# Patient Record
Sex: FEMALE | Race: WHITE | NOT HISPANIC OR LATINO | Employment: OTHER | URBAN - METROPOLITAN AREA
[De-identification: names, ages, dates, MRNs, and addresses within clinical notes are randomized per-mention and may not be internally consistent; named-entity substitution may affect disease eponyms.]

---

## 2023-07-19 ENCOUNTER — APPOINTMENT (OUTPATIENT)
Dept: PRIMARY CARE | Facility: CLINIC | Age: 68
End: 2023-07-19
Payer: MEDICARE

## 2023-09-14 ENCOUNTER — OFFICE VISIT (OUTPATIENT)
Dept: PRIMARY CARE | Facility: CLINIC | Age: 68
End: 2023-09-14
Payer: MEDICARE

## 2023-09-14 VITALS
BODY MASS INDEX: 27.36 KG/M2 | SYSTOLIC BLOOD PRESSURE: 120 MMHG | HEART RATE: 60 BPM | WEIGHT: 154.4 LBS | HEIGHT: 63 IN | DIASTOLIC BLOOD PRESSURE: 62 MMHG | TEMPERATURE: 97.2 F | RESPIRATION RATE: 16 BRPM

## 2023-09-14 DIAGNOSIS — M85.80 OSTEOPENIA, UNSPECIFIED LOCATION: Primary | ICD-10-CM

## 2023-09-14 DIAGNOSIS — Z00.00 HEALTH CARE MAINTENANCE: ICD-10-CM

## 2023-09-14 DIAGNOSIS — E55.9 VITAMIN D DEFICIENCY: ICD-10-CM

## 2023-09-14 DIAGNOSIS — R53.83 OTHER FATIGUE: ICD-10-CM

## 2023-09-14 DIAGNOSIS — Z23 NEED FOR VACCINATION: ICD-10-CM

## 2023-09-14 PROBLEM — M17.12 LEFT KNEE DJD: Status: ACTIVE | Noted: 2023-09-14

## 2023-09-14 PROBLEM — G43.909 MIGRAINES: Status: ACTIVE | Noted: 2023-09-14

## 2023-09-14 PROBLEM — M79.10 MYALGIA: Status: ACTIVE | Noted: 2023-09-14

## 2023-09-14 PROBLEM — J34.89 NASAL PAIN: Status: ACTIVE | Noted: 2023-09-14

## 2023-09-14 PROBLEM — E89.40 POSTSURGICAL MENOPAUSE: Status: ACTIVE | Noted: 2023-09-14

## 2023-09-14 PROBLEM — R20.0 NUMBNESS: Status: ACTIVE | Noted: 2023-09-14

## 2023-09-14 PROBLEM — U07.1 COVID-19 VIRUS INFECTION: Status: ACTIVE | Noted: 2023-09-14

## 2023-09-14 PROBLEM — G44.209 TENSION HEADACHE: Status: ACTIVE | Noted: 2023-09-14

## 2023-09-14 PROBLEM — M25.562 KNEE PAIN, LEFT: Status: ACTIVE | Noted: 2023-09-14

## 2023-09-14 PROBLEM — R51.9 FACIAL PAIN: Status: ACTIVE | Noted: 2023-09-14

## 2023-09-14 PROBLEM — M53.3 SACRAL BACK PAIN: Status: ACTIVE | Noted: 2023-09-14

## 2023-09-14 PROBLEM — M67.869: Status: ACTIVE | Noted: 2023-09-14

## 2023-09-14 PROBLEM — M54.2 CHRONIC NECK PAIN: Status: ACTIVE | Noted: 2023-09-14

## 2023-09-14 PROBLEM — N63.0 BREAST NODULE: Status: ACTIVE | Noted: 2023-09-14

## 2023-09-14 PROBLEM — M79.9 POSTURAL STRAIN: Status: ACTIVE | Noted: 2023-09-14

## 2023-09-14 PROBLEM — M77.9 TENDINITIS: Status: ACTIVE | Noted: 2023-09-14

## 2023-09-14 PROBLEM — G89.29 CHRONIC NECK PAIN: Status: ACTIVE | Noted: 2023-09-14

## 2023-09-14 PROBLEM — M25.78 CERVICAL OSTEOPHYTE: Status: ACTIVE | Noted: 2023-09-14

## 2023-09-14 PROBLEM — M54.12 CERVICAL RADICULITIS: Status: ACTIVE | Noted: 2023-09-14

## 2023-09-14 PROBLEM — R51.9 SEVERE FRONTAL HEADACHES: Status: ACTIVE | Noted: 2023-09-14

## 2023-09-14 PROBLEM — M47.816 SPONDYLOSIS OF LUMBAR REGION WITHOUT MYELOPATHY OR RADICULOPATHY: Status: ACTIVE | Noted: 2023-09-14

## 2023-09-14 PROBLEM — G44.86 CERVICOGENIC HEADACHE: Status: ACTIVE | Noted: 2023-09-14

## 2023-09-14 PROBLEM — Z90.710 HISTORY OF TOTAL ABDOMINAL HYSTERECTOMY: Status: ACTIVE | Noted: 2023-09-14

## 2023-09-14 PROBLEM — F41.9 ANXIETY: Status: ACTIVE | Noted: 2023-09-14

## 2023-09-14 PROBLEM — M79.674 PAIN OF TOE OF RIGHT FOOT: Status: ACTIVE | Noted: 2023-09-14

## 2023-09-14 PROBLEM — M47.812 SPONDYLOSIS OF CERVICAL REGION WITHOUT MYELOPATHY OR RADICULOPATHY: Status: ACTIVE | Noted: 2023-09-14

## 2023-09-14 PROBLEM — E78.5 DYSLIPIDEMIA: Status: ACTIVE | Noted: 2023-09-14

## 2023-09-14 PROBLEM — R39.9 UTI SYMPTOMS: Status: ACTIVE | Noted: 2023-09-14

## 2023-09-14 PROBLEM — M99.09 SEGMENTAL AND SOMATIC DYSFUNCTION: Status: ACTIVE | Noted: 2023-09-14

## 2023-09-14 PROCEDURE — 99214 OFFICE O/P EST MOD 30 MIN: CPT | Performed by: INTERNAL MEDICINE

## 2023-09-14 PROCEDURE — G0008 ADMIN INFLUENZA VIRUS VAC: HCPCS | Performed by: INTERNAL MEDICINE

## 2023-09-14 PROCEDURE — 90662 IIV NO PRSV INCREASED AG IM: CPT | Performed by: INTERNAL MEDICINE

## 2023-09-14 PROCEDURE — 1160F RVW MEDS BY RX/DR IN RCRD: CPT | Performed by: INTERNAL MEDICINE

## 2023-09-14 PROCEDURE — 1159F MED LIST DOCD IN RCRD: CPT | Performed by: INTERNAL MEDICINE

## 2023-09-14 PROCEDURE — 1157F ADVNC CARE PLAN IN RCRD: CPT | Performed by: INTERNAL MEDICINE

## 2023-09-14 RX ORDER — CALCIUM CARBONATE 300MG(750)
400 TABLET,CHEWABLE ORAL DAILY
COMMUNITY

## 2023-09-14 RX ORDER — MULTIVITAMIN
1 TABLET ORAL DAILY
COMMUNITY

## 2023-09-14 RX ORDER — ASCORBIC ACID 500 MG
500 TABLET ORAL DAILY
COMMUNITY

## 2023-09-14 RX ORDER — CLOCORTOLONE PIVALATE 0 G/G
CREAM TOPICAL AS NEEDED
COMMUNITY

## 2023-09-14 RX ORDER — CETIRIZINE HYDROCHLORIDE 10 MG/1
10 TABLET ORAL DAILY PRN
COMMUNITY

## 2023-09-14 RX ORDER — DICLOFENAC SODIUM 10 MG/G
GEL TOPICAL
COMMUNITY
End: 2023-12-08 | Stop reason: ALTCHOICE

## 2023-09-14 RX ORDER — FLUTICASONE PROPIONATE 50 MCG
SPRAY, SUSPENSION (ML) NASAL AS NEEDED
COMMUNITY

## 2023-09-14 RX ORDER — CHOLECALCIFEROL (VITAMIN D3) 25 MCG
1000 TABLET ORAL DAILY
COMMUNITY

## 2023-09-14 ASSESSMENT — PATIENT HEALTH QUESTIONNAIRE - PHQ9: 2. FEELING DOWN, DEPRESSED OR HOPELESS: NOT AT ALL

## 2023-09-14 NOTE — PATIENT INSTRUCTIONS
Was nice seeing you today.  Continue same medication.  Have lab work done before next appointment if labs were ordered today.  Fu in 6 month/prn  Call/ contact our office with any concerns.    - Maintaining good vitamin D blood levels is important for bone health and overall health. Please see additional information on vitamin D below.  A vitamin D blood test, called vitamin D 25-hydroxy (OH) is obtained to assess vitamin D level. If the vitamin D is low, you will need to take a vitamin D supplement. If you are already on a vitamin D supplement, then the dose will need to be adjusted.  Also you multivitamin may contain vitamin D.  Vitamin D is a fat soluble vitamin that requires it be taken with good fat to be absorbed. Examples of healthy fat include nuts, seeds, avocados, olives and for the most part the meal of the day.  Spending up to 30 minutes in the sun during Summer and late Spring can provide natural vitamin D to the uncovered skin (arms, legs)    - Your calcium can be sufficient in your diet.  Healthy food that are rich in calcium include: nuts, seeds, legumes/beans, peas, dark green leafy vegetables, plant based milk  Additional calcium rich foods listed below  - Soaking Almonds overnight in the fridge with drinking water, can help with softening the almonds and improved absorption of the almonds. Please be careful not to break a tooth with dry raw almonds, or other hard nuts or seeds.  If your lab work shows insufficient calcium or you are unable to consume sufficient foods rich in calcium, then a calcium supplement is recommended, such as calcium citrate.    - You can track your nutrition and calcium intake on [email or web address]  This provides macro and micronutrient intake and requirements.  - You can track your calcium intake on [email or web address] or any other calcium tracker of your choice.  If you are not getting about 1200 mg of calcium daily, you will need to add a calcium supplement, such as calcium  citrate to supplement you daily calcium so you can achieve your total 1200 mg daily  See additional information below on calcium.    - I recommend following a healthy lifestyle. You can find additional information below.      - You can track your nutrition and calcium intake on [email or web address].com  This provides macro and micronutrient intake and requirements.   GENERAL INFORMATION ON BONE HEALTH :   -Bone Density testing (DXA scan) as recommended.   -Vitamin D supplementation is recommended, unless blood levels are sufficient.   Recommended daily dose of 1000 to 2000 IU total a day, or the dose necessary to achieve a Vitamin D 25-OH blood level of >31 and preferably closer to 40-60 ng/mL.   Vitamin D pills are available over the counter.  -Recommended daily dose of calcium: 1200mg total a day in divided doses.  Calcium is usually sufficient in our regular diet, also available in multivitamins. Patients on certain dietary restrictions or those unable to meet their daily calcium by diety alone, may require calcium supplements. For patient with history of calcium kidney stones, Calcium Citrate would be the recommended supplement. It is recommended to avoid caclium carbonate supplement in this case, as these may increase risk of calcium kidney stones.  When you read a food label and you see calcium reported as DV %, add a zero and this will provide you with the approximate mg value of the calcium content in this food. For example, if a glass of almond milk is labeled as 40% calcium DV, then this contains 400 mg of calcium.  -Regular weight-bearing and muscle-strengthening exercise   -Avoidance of tobacco smoking, excessive alcohol intake and excessive caffeine intake.   -Fall and fracture precautions   -It is recommend to continue regular follow up visits with your dentist every 6 months, and continue with good oral hygiene.    Calcium:   If your diet is sufficient in Calcium rich food, you will not need calcium  supplement.    Calcium Citrate is the preferred calcium if you have had kidney stones.  Daily recommended calcium dose: 600mg twice a day with meals.    Adequate calcium ingestion is essential for maintaining healthy bones. The recommended dose daily intake of calcium varies depending on individual needs but is usually between 1200 and 1500mg daily, preferably around 1200mg a day in divided dose (not all taken at once). This is equivalent to about five 8oz glasses of milk per day.   Many foods are rich in calcium and they include:  - Plant based, non-dairy, calcium rich products, include nuts, almond milk, beans, lentils  - Vegetables and Fruit: bok-montanez, turnips, broccoli, kale, collards,  - Dairy products: milk, cheese, yogurt, ice-cream  - Fish products: canned salmon, sardines and shrimp  - Cereals and nuts: almonds, sesame seeds, fortified cereals and oatmeal  - Other foods: fortified orange-juice, figs, soybeans, other beans and eggs.  If you have a low calcium diet and cannot tolerate calcium-rich foods, many supplements are available today. Your pharmacist can help you choose the one which best suits your needs. A few tips on supplements:  - They should be easy to swallow  - They should dissolve easily in ½ cup of vinegar in < 15 minutes.  - Count the ELEMENTAL calcium mgs. E.g. Calcium 499mg may have only 221mg of elemental Calcium.  - Calcium citrate is the calcium supplement to take if you have had kidney stones and unable to meet your calcium requirements from food/diet alone.  - There is such a variety today that it is best to bring in the bottle to your doctor to show them exactly what you are taking.   Lastly too much calcium can be bad for you. Recent studies show extra supplements may increase your risk of kidney stones or cause high calcium levels in some people. You should discuss how much you should be taking with your doctor before starting them.  Further Information is available from the  following resources:  www.nof.org (National Osteoporosis Foundation)  http://www.osteo.org/osteolinks.asp  National Institutes of Health: 0-026-921-BONE  The Calcium Information [place]: 3-733-386-5284    -Non-Dairy, Plant based Milk, can contain in1 glass up to 450 mg of calcium (300 to 450 mg)  Exampled include Oat Milk, Flax Milk, [place] Milk, Cashew Milk, Soy Milk, Peas Milk  general health and well being    Examples of Food Sources of Calcium from [facility]  Food Milligrams (mg)  per serving Percent DV*   Soymilk, calcium-fortified, 8 ounces 299 30   Orange juice, calcium-fortified, 6 ounces 261 26   Tofu, firm, made with calcium sulfate, ½ cup* 253 25   Tofu, soft, made with calcium sulfate, ½ cup* 138 14   Ready-to-eat cereal, calcium-fortified, 1 cup 100-1,000    Turnip greens, fresh, boiled, ½ cup 99 10   Kale, raw, chopped, 1 cup 100 10   Kale, fresh, cooked, 1 cup 94 9   Chinese cabbage, bok montanez, raw, shredded, 1 cup 74 7   Bread, white, 1 slice 73 7   Tortilla, corn, ready-to-bake/woodall, one 6” diameter 46 5   Tortilla, flour, ready-to-bake/woodall, one 6” diameter 32 3   Bread, whole-wheat, 1 slice 30 3   Broccoli, raw, ½ cup 21 2   * DV = Daily Value. DVs were developed by the U.S. Food and Drug Administration to help consumers compare the nutrient contents among products within the context of a total daily diet.   The U.S. Department of Agriculture’s (USDA’s) Nutrient Database Web site lists the nutrient content of many foods and provides comprehensive list of foods containing calcium arranged by nutrient content and by food name.  *Calcium content varies slightly by fat content; the more fat, the less calcium the food contains.  * Calcium content is for tofu processed with a calcium salt. Tofu processed with other salts does not provide significant amounts of calcium.  You could acces this information online at: http://ods.od.nih.gov/factsheets/Calcium-HealthProfessional/    Vitamin D:   Vitamin D3=  cholecalciferol, available over the counter.  Dose recommended 800 to 1000 iu daily with a meal; Certain patients require 8023-5084 iu daily and in patients deficient in Vitamin D, they require higher dosages.  Certain patient requires higher dose, depending on their Vit D blood levels.   Vitamin D is essential for calcium metabolism. It is really a hormone produced mainly in your skin after exposure to sunlight. Vitamin D helps you absorb calcium from your stomach and kidneys and incorporates it into your bones. Studies show approximately 50% of [place] men and women are vitamin D deficient in the winter. Milder cases of vitamin D are usually asymptomatic so the only way to know you have a problem is to have a blood level checked. More severe cases can cause osteomalacia (a.k.a. rickets) which can result in bone pain, weak bones and several abnormal laboratory tests and also weak muscles (a.k.a. myopathy). When this happens, your bones lose a lot of their calcium stores as the body tries to regulate the calcium required by other tissues. Prolonged deficiency can lead to severe bone disorders and fractures.  Unlike calcium, dietary sources of vitamin D are rare, limited to a few fish oils particularly cod-liver oil, other fortified foods and egg yolks. and most are unhealthy. Natural source of vitamin D is through sunshine. This is usually during debbie seasons, for example a 30 minute exposure to sunshine. Some people are unable to be exposed to the sun due to skin condition. Often supplementation is needed. Many multivitamins contain some vitamin D and vitamin D alone preparations are now available in several forms. The recommended daily intake of vitamin D used to be 400 and 800 international units, however, it is now known that larger amounts are needed, as discussed above. Your doctor can prescribe prescription strength vitamin D for you if necessary, if you have marked deficiency or diseases of the liver  or kidney. Supplementation in patients with severe deficiency can stabilize or improve bone mineral density and in frail elderly persons, may reduce their risk of falling.

## 2023-09-14 NOTE — PROGRESS NOTES
"Subjective   Yoana Herzog is a 68 y.o. female who presents for Follow-up (6 months follow up/Mammogram and dexa-9.2023).  6 months follow up  Mammogram and dexa- 9.2023  Retired , doing very well, has nice supportive family.  To have colonoscopy soon      Review of Systems   All other systems reviewed and are negative.      Objective   Physical Exam  Constitutional:       Appearance: Normal appearance.   HENT:      Head: Normocephalic and atraumatic.   Eyes:      Pupils: Pupils are equal, round, and reactive to light.   Cardiovascular:      Rate and Rhythm: Normal rate and regular rhythm.   Pulmonary:      Effort: Pulmonary effort is normal.      Breath sounds: Normal breath sounds.   Musculoskeletal:         General: Normal range of motion.      Cervical back: Normal range of motion and neck supple.   Skin:     General: Skin is warm.   Neurological:      General: No focal deficit present.      Mental Status: She is alert and oriented to person, place, and time.   Psychiatric:         Mood and Affect: Mood normal.         Behavior: Behavior normal.       /62 (BP Location: Left arm, Patient Position: Sitting)   Pulse 60   Temp 36.2 °C (97.2 °F)   Resp 16   Ht 1.6 m (5' 3\")   Wt 70 kg (154 lb 6.4 oz)   BMI 27.35 kg/m²       Assessment/Plan   Problem List Items Addressed This Visit       Osteopenia - Primary     2023 -1.4, better then  2021  Continue same regim         Vitamin D deficiency    Relevant Orders    Vitamin D 25-Hydroxy,Total (for eval of Vitamin D levels)     Other Visit Diagnoses       Need for vaccination        Relevant Orders    Flu vaccine, quadrivalent, high-dose, preservative free, age 65y+ (FLUZONE)    Health care maintenance        Relevant Orders    CBC and Auto Differential    TSH with reflex to Free T4 if abnormal    Urinalysis with Reflex Microscopic    Vitamin B12    Magnesium    Lipid Panel    Hepatitis C Antibody    Comprehensive Metabolic Panel    Vitamin D 25-Hydroxy,Total " (for eval of Vitamin D levels)    Other fatigue        Relevant Orders    CBC and Auto Differential

## 2023-10-20 RX ORDER — FEXOFENADINE HCL AND PSEUDOEPHEDRINE HCI 60; 120 MG/1; MG/1
1 TABLET, EXTENDED RELEASE ORAL 2 TIMES DAILY
COMMUNITY
Start: 2007-03-22 | End: 2023-12-08 | Stop reason: ALTCHOICE

## 2023-10-20 RX ORDER — IVERMECTIN 10 MG/G
CREAM TOPICAL
COMMUNITY
End: 2023-12-08 | Stop reason: ALTCHOICE

## 2023-10-20 RX ORDER — IBUPROFEN 600 MG/1
600 TABLET ORAL
COMMUNITY
End: 2023-12-08 | Stop reason: ALTCHOICE

## 2023-10-23 ENCOUNTER — ALLIED HEALTH (OUTPATIENT)
Dept: INTEGRATIVE MEDICINE | Facility: CLINIC | Age: 68
End: 2023-10-23
Payer: MEDICARE

## 2023-10-23 DIAGNOSIS — M53.3 SACRAL BACK PAIN: ICD-10-CM

## 2023-10-23 DIAGNOSIS — M99.03 SEGMENTAL AND SOMATIC DYSFUNCTION OF LUMBAR REGION: ICD-10-CM

## 2023-10-23 DIAGNOSIS — M79.9 POSTURAL STRAIN: ICD-10-CM

## 2023-10-23 DIAGNOSIS — M99.04 SEGMENTAL AND SOMATIC DYSFUNCTION OF SACRAL REGION: ICD-10-CM

## 2023-10-23 DIAGNOSIS — M47.816 LUMBAR SPONDYLOSIS: ICD-10-CM

## 2023-10-23 DIAGNOSIS — M54.50 CHRONIC RIGHT-SIDED LOW BACK PAIN WITHOUT SCIATICA: ICD-10-CM

## 2023-10-23 DIAGNOSIS — M47.812 CERVICAL SPONDYLOSIS WITHOUT MYELOPATHY: ICD-10-CM

## 2023-10-23 DIAGNOSIS — G89.29 CHRONIC RIGHT-SIDED LOW BACK PAIN WITHOUT SCIATICA: ICD-10-CM

## 2023-10-23 DIAGNOSIS — M99.00 SEGMENTAL AND SOMATIC DYSFUNCTION OF HEAD REGION: Primary | ICD-10-CM

## 2023-10-23 DIAGNOSIS — M99.01 SEGMENTAL AND SOMATIC DYSFUNCTION OF CERVICAL REGION: ICD-10-CM

## 2023-10-23 DIAGNOSIS — M79.10 MYALGIA: ICD-10-CM

## 2023-10-23 DIAGNOSIS — M54.2 NECK PAIN: ICD-10-CM

## 2023-10-23 DIAGNOSIS — M99.02 SEGMENTAL AND SOMATIC DYSFUNCTION OF THORACIC REGION: ICD-10-CM

## 2023-10-23 DIAGNOSIS — M99.05 SEGMENTAL AND SOMATIC DYSFUNCTION OF PELVIC REGION: ICD-10-CM

## 2023-10-23 PROCEDURE — 98942 CHIROPRACTIC MANJ 5 REGIONS: CPT | Performed by: CHIROPRACTOR

## 2023-10-23 ASSESSMENT — ENCOUNTER SYMPTOMS
AGITATION: 0
DIFFICULTY URINATING: 0
CONFUSION: 0
ALLERGIC/IMMUNOLOGIC COMMENTS: +SEASONAL ALLERGIES
SHORTNESS OF BREATH: 0
NAUSEA: 0
FACIAL ASYMMETRY: 0
ABDOMINAL PAIN: 0
FEVER: 0
CHILLS: 0

## 2023-10-23 NOTE — PROGRESS NOTES
Subjective   Patient ID: Yoana Herzog is a 68 y.o. female who presents today for the treatment of pain involving their left sided neck and right sided hip/sacrum pain.    This is visit 12 of the calendar year. Medicare.    Fall risk: None. No falls in the last 6 months.     BORIS Lees presents today with the same complaints of left sided neck and right side hip/sacrum pain. She has been doing well since our last encounter as she would like to focus treatment on the same regions as it has been beneficial for her. 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 No additional injuries or changes in her medical history.    Patient describes the pain as achiness and tingling, and rates the current pain 4 out of 10 (10 being the worst).     Last treatment visit 9/26/23:  She would like to continue to focus treatment on the left greater than right upper trapezius and neck regions. 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 Therefore she has been doing a lot of bending lifting and reaching. She has not been doing this consecutive days so she has days in between for recovery and rest. She also continues to have soreness and tightness in the right sacrum which is another chronic area that has regular exacerbations and flareups.      INITIAL INTAKE/SUBJECTIVE 12/12/22: She has seen chiropractic in the past. Her last adjustment was about 5 weeks ago. Is not that she is dissatisfied with the care she has been given, but she is hoping that our treatments will help her completely resolve the issue to the point where she does not need regular care.     Today her primary complaint is left neck and shoulder pain. 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 She also enjoys sewing and notices this pain does get worse after she has been doing a lot of sewing. She will  occasionally feel tingling into the left hand most notably in the #3 through 5 fingers. She does have some headaches but these are localized to the suboccipital region and do not radiate into the forehead eyes or temples. In the past her physical therapist and chiropractors have done manual stretching of the left shoulder and done adjustments which have helped.     Her secondary pain complaints are in the right hip glued and sacral pains. She does sleep with a pillow between the knees. She is unsure why but the last 6 to 12 months this has been getting progressively worse. She will occasionally have pain in the hamstring region but this is rare. She will occasionally feel sharp intense pain in the hip or the lower back but this only typically lasts a few seconds. Generally is more of a dull aching pain. The point of max tenderness is localized to the middle of the right butt cheek. No one has worked on this or massage it in the past.    Review of Systems   Constitutional:  Negative for chills and fever.   HENT:  Negative for congestion and sneezing.    Eyes:  Negative for visual disturbance.   Respiratory:  Negative for shortness of breath.    Cardiovascular:  Negative for chest pain.   Gastrointestinal:  Negative for abdominal pain and nausea.   Endocrine: Negative for polyuria.   Genitourinary:  Negative for difficulty urinating.        2004 Hysterectomy   Musculoskeletal:         +Osteopenia, BL knee surgeries 1960/1970.   Skin:         +Rosacea    Allergic/Immunologic: Positive for environmental allergies.        +seasonal allergies   Neurological:  Negative for facial asymmetry.   Psychiatric/Behavioral:  Negative for agitation and confusion.      Objective   Observation : normal gait and forward head posture    Physical Exam  Examination findings (palpation & ROM): Tenderness palpated over the UT and levator scapulae on the left. Hypertonicity palpated over the right piriformis, right SIJ, right gluteal  regions.      Segmental joint dysfunction was identified in the following areas using motion palpation and/or pain provocation assessment:  Cervical: C0-C3 (Supine)   Thoracic: T4-T9 (Prone)  Lumbar: L3-L5 (Scoop)  Sacrum: L5/S1 and right SI (Drop)    Technique Used: diversified CMT, pelvic drop table technique and manual traction.      Today's treatment:  Performed spinal manipulation to the regions of segmental dysfunction identified on examination using age-appropriate and injury specific force, and manual diversified technique.     Brief Seated IASTM and supine MFR to the L>R neck, upper trapezius and L shoulder region. Side-lying and prone MFR, IC to the R SIJ, R piriformis, and R gluteals.         Treatment Plan:   The patient and I discussed the risks and benefits of chiropractic care. Based on the patient's subjective complaints along with the examination findings, it is advised that a course of chiropractic treatment by initiated. Consent for care was given both written and orally by the patient. The patient tolerated today's treatment with little or no additional discomfort and was instructed to contact the office for questions or concerns.     Treatment Frequency: Will see/treat patient every 2-4 weeks as therapeutic benefit is sustained, then frequency will be reduced to as needed for symptom management or as needed for acute flare-ups/exacerbation.     Please note: Voice-to-text software was used when completing this note.  While the note was proofread, portions may include grammatical errors.  Please contact me with any questions/concerns as it relates to these types of errors.

## 2023-11-06 ENCOUNTER — OFFICE VISIT (OUTPATIENT)
Dept: OTOLARYNGOLOGY | Facility: CLINIC | Age: 68
End: 2023-11-06
Payer: MEDICARE

## 2023-11-06 ENCOUNTER — LAB (OUTPATIENT)
Dept: LAB | Facility: LAB | Age: 68
End: 2023-11-06
Payer: MEDICARE

## 2023-11-06 VITALS
HEIGHT: 63 IN | DIASTOLIC BLOOD PRESSURE: 77 MMHG | HEART RATE: 60 BPM | BODY MASS INDEX: 27.46 KG/M2 | SYSTOLIC BLOOD PRESSURE: 143 MMHG | WEIGHT: 155 LBS

## 2023-11-06 DIAGNOSIS — K11.8 SUBMANDIBULAR GLAND MASS: ICD-10-CM

## 2023-11-06 LAB
BUN SERPL-MCNC: 15 MG/DL (ref 6–23)
CREAT SERPL-MCNC: 0.71 MG/DL (ref 0.5–1.05)
GFR SERPL CREATININE-BSD FRML MDRD: >90 ML/MIN/1.73M*2

## 2023-11-06 PROCEDURE — 82565 ASSAY OF CREATININE: CPT

## 2023-11-06 PROCEDURE — 99203 OFFICE O/P NEW LOW 30 MIN: CPT | Performed by: OTOLARYNGOLOGY

## 2023-11-06 PROCEDURE — 1160F RVW MEDS BY RX/DR IN RCRD: CPT | Performed by: OTOLARYNGOLOGY

## 2023-11-06 PROCEDURE — 36415 COLL VENOUS BLD VENIPUNCTURE: CPT

## 2023-11-06 PROCEDURE — 1159F MED LIST DOCD IN RCRD: CPT | Performed by: OTOLARYNGOLOGY

## 2023-11-06 PROCEDURE — 84520 ASSAY OF UREA NITROGEN: CPT

## 2023-11-06 NOTE — PROGRESS NOTES
ENT Outpatient Consultation    Chief Complaint: right submandibular mass  History Of Present Illness  Yoana Herzog is a 68 y.o. female presents for evaluation of a right submandibular mass that was noted by her dentist back in September.  She has not noticed any significant change since that time.  She is asymptomatic.  She denies history of skin cancer.  She denies history of recurrent swelling or pain on that side.  She can feel pressure radiating up to her ear when she pushes on it.  She has not had any imaging     Past Medical History  She has a past medical history of Pain in unspecified knee and Pain in unspecified shoulder.    Surgical History  She has a past surgical history that includes Tonsillectomy (04/09/2018); Knee surgery (04/09/2018); and Hysterectomy (04/09/2018).     Social History  She reports that she has never smoked. She uses smokeless tobacco. She reports current alcohol use. She reports that she does not currently use drugs.    Family History  Family History   Problem Relation Name Age of Onset    Stroke Mother      Diabetes Mother      Hypertension Mother      Other (Open Heart Surgery) Mother      Stroke Father          Allergies  Amoxicillin-pot clavulanate, Bee pollen, House dust, and House dust mite     Physical Exam:  CONSTITUTIONAL:  No acute distress  VOICE:  No hoarseness or other abnormality  RESPIRATION:  Breathing comfortably, no stridor  CV:  No clubbing/cyanosis/edema in hands  EYES:  EOM intact, sclera normal  NEURO:  Alert and oriented times 3, Cranial nerves II-XII grossly intact and symmetric bilaterally  HEAD AND FACE:  Symmetric facial features, no masses or lesions, sinuses non-tender to palpation  SALIVARY GLANDS: Palpable mass in the right submandibular gland, normal salivary outflow. Other glands normal on palpation  EARS:  Normal external ears, external auditory canals, and TMs to otoscopy, normal hearing to whispered voice.  NOSE:  External nose midline, anterior  rhinoscopy is normal with limited visualization to the anterior aspect of the interior turbinates, no bleeding or drainage, no lesions  ORAL CAVITY/OROPHARYNX/LIPS:  Normal mucous membranes, normal floor of mouth/tongue/OP, no masses or lesions  PHARYNGEAL WALLS:  No masses or lesions  NECK/LYMPH:  No palpable LAD, no thyroid masses, trachea midline  SKIN:  Neck skin is without scar or injury  PSYCH:  Alert and oriented with appropriate mood and affect     Assessment and Plan  68 y.o. female with right submandibular mass.  This was found during a physical exam by her dentist.  She has not had formal imaging.  We discussed possibilities including sialolith, salivary gland neoplasm, lymphadenopathy.  She will get the CT scan and follow-up afterwards for review and possible FNA.  All questions answered    Hussein Douglass MD

## 2023-11-16 ENCOUNTER — HOSPITAL ENCOUNTER (OUTPATIENT)
Dept: RADIOLOGY | Facility: HOSPITAL | Age: 68
Discharge: HOME | End: 2023-11-16
Payer: MEDICARE

## 2023-11-16 DIAGNOSIS — K11.8 SUBMANDIBULAR GLAND MASS: ICD-10-CM

## 2023-11-16 PROCEDURE — 2550000001 HC RX 255 CONTRASTS: Performed by: OTOLARYNGOLOGY

## 2023-11-16 PROCEDURE — 70491 CT SOFT TISSUE NECK W/DYE: CPT | Performed by: RADIOLOGY

## 2023-11-16 PROCEDURE — 70491 CT SOFT TISSUE NECK W/DYE: CPT

## 2023-11-16 RX ADMIN — IOHEXOL 70 ML: 350 INJECTION, SOLUTION INTRAVENOUS at 08:34

## 2023-11-28 ENCOUNTER — ALLIED HEALTH (OUTPATIENT)
Dept: INTEGRATIVE MEDICINE | Facility: CLINIC | Age: 68
End: 2023-11-28
Payer: MEDICARE

## 2023-11-28 DIAGNOSIS — M47.812 CERVICAL SPONDYLOSIS WITHOUT MYELOPATHY: ICD-10-CM

## 2023-11-28 DIAGNOSIS — M99.03 SEGMENTAL AND SOMATIC DYSFUNCTION OF LUMBAR REGION: ICD-10-CM

## 2023-11-28 DIAGNOSIS — M99.04 SEGMENTAL AND SOMATIC DYSFUNCTION OF SACRAL REGION: ICD-10-CM

## 2023-11-28 DIAGNOSIS — M99.05 SEGMENTAL AND SOMATIC DYSFUNCTION OF PELVIC REGION: ICD-10-CM

## 2023-11-28 DIAGNOSIS — M54.50 CHRONIC RIGHT-SIDED LOW BACK PAIN WITHOUT SCIATICA: ICD-10-CM

## 2023-11-28 DIAGNOSIS — M79.9 POSTURAL STRAIN: ICD-10-CM

## 2023-11-28 DIAGNOSIS — M79.10 MYALGIA: ICD-10-CM

## 2023-11-28 DIAGNOSIS — M99.00 SEGMENTAL AND SOMATIC DYSFUNCTION OF HEAD REGION: Primary | ICD-10-CM

## 2023-11-28 DIAGNOSIS — M54.2 NECK PAIN: ICD-10-CM

## 2023-11-28 DIAGNOSIS — G89.29 CHRONIC RIGHT-SIDED LOW BACK PAIN WITHOUT SCIATICA: ICD-10-CM

## 2023-11-28 DIAGNOSIS — M47.816 LUMBAR SPONDYLOSIS: ICD-10-CM

## 2023-11-28 DIAGNOSIS — M99.01 SEGMENTAL AND SOMATIC DYSFUNCTION OF CERVICAL REGION: ICD-10-CM

## 2023-11-28 DIAGNOSIS — M99.02 SEGMENTAL AND SOMATIC DYSFUNCTION OF THORACIC REGION: ICD-10-CM

## 2023-11-28 PROCEDURE — 98942 CHIROPRACTIC MANJ 5 REGIONS: CPT | Performed by: CHIROPRACTOR

## 2023-11-28 ASSESSMENT — ENCOUNTER SYMPTOMS
NAUSEA: 0
SHORTNESS OF BREATH: 0
ABDOMINAL PAIN: 0
ALLERGIC/IMMUNOLOGIC COMMENTS: +SEASONAL ALLERGIES
FEVER: 0
DIFFICULTY URINATING: 0
CONFUSION: 0
FACIAL ASYMMETRY: 0
AGITATION: 0
CHILLS: 0

## 2023-11-28 NOTE — PROGRESS NOTES
Subjective   Patient ID: Yoana Herzog is a 68 y.o. female who presents today for the treatment of pain involving their left sided neck and right sided hip/sacrum pain.    This is visit 13 of the calendar year. Medicare.    Fall risk: None. No falls in the last 6 months.     HPI -no acute pain issues today, however the left neck has been bothering her more and the right sacrum has been a little more stiff than usual.  She has been participating in the [employer] for [employer] [place] and has recently finished decorating her home for [employer].  She also mentions that she is recovering from a head cold which has contributed to some lack of energy and physical movement over the last week.    Patient describes the pain as achiness and tingling, and rates the current pain 4 out of 10 (10 being the worst).     Last treatment visit 10/23/23: Yoana presents today with the same complaints of left sided neck and right side hip/sacrum pain. She has been doing well since our last encounter as she would like to focus treatment on the same regions as it has been beneficial for her. 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 No additional injuries or changes in her medical history.    9/26/23:  She would like to continue to focus treatment on the left greater than right upper trapezius and neck regions. 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 Therefore she has been doing a lot of bending lifting and reaching. She has not been doing this consecutive days so she has days in between for recovery and rest. She also continues to have soreness and tightness in the right sacrum which is another chronic area that has regular exacerbations and flareups.      INITIAL INTAKE/SUBJECTIVE 12/12/22: She has seen chiropractic in the past. Her last adjustment was about 5 weeks ago. Is not that she is dissatisfied with the care she has been given, but she is hoping that our  treatments will help her completely resolve the issue to the point where she does not need regular care.     Today her primary complaint is left neck and shoulder pain. 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 She also enjoys sewing and notices this pain does get worse after she has been doing a lot of sewing. She will occasionally feel tingling into the left hand most notably in the #3 through 5 fingers. She does have some headaches but these are localized to the suboccipital region and do not radiate into the forehead eyes or temples. In the past her physical therapist and chiropractors have done manual stretching of the left shoulder and done adjustments which have helped.     Her secondary pain complaints are in the right hip glued and sacral pains. She does sleep with a pillow between the knees. She is unsure why but the last 6 to 12 months this has been getting progressively worse. She will occasionally have pain in the hamstring region but this is rare. She will occasionally feel sharp intense pain in the hip or the lower back but this only typically lasts a few seconds. Generally is more of a dull aching pain. The point of max tenderness is localized to the middle of the right butt cheek. No one has worked on this or massage it in the past.    Review of Systems   Constitutional:  Negative for chills and fever.   HENT:  Negative for congestion and sneezing.    Eyes:  Negative for visual disturbance.   Respiratory:  Negative for shortness of breath.    Cardiovascular:  Negative for chest pain.   Gastrointestinal:  Negative for abdominal pain and nausea.   Endocrine: Negative for polyuria.   Genitourinary:  Negative for difficulty urinating.        2004 Hysterectomy   Musculoskeletal:         +Osteopenia, BL knee surgeries 1960/1970.   Skin:         +Rosacea    Allergic/Immunologic: Positive for environmental  allergies.        +seasonal allergies   Neurological:  Negative for facial asymmetry.   Psychiatric/Behavioral:  Negative for agitation and confusion.      Objective   Observation : normal gait and forward head posture    Physical Exam  Examination findings (palpation & ROM): Tenderness palpated over the UT and levator scapulae on the left. Hypertonicity palpated over the right piriformis, right SIJ, right gluteal regions.    Segmental joint dysfunction was identified in the following areas using motion palpation and/or pain provocation assessment:  Cervical: C0-C3 (Supine)   Thoracic: T4-T9 (Prone)  Lumbar: L3-L5 (Scoop)  Sacrum: L5/S1 and right SI (Drop)    Technique Used: diversified CMT, pelvic drop table technique and manual traction.      Today's treatment:  Performed spinal manipulation to the regions of segmental dysfunction identified on examination using age-appropriate and injury specific force, and manual diversified technique.     Brief Seated IASTM and supine MFR to the L>R neck, upper trapezius and L shoulder region. Side-lying and prone MFR, IC to the R SIJ, R piriformis, and R gluteals.         Treatment Plan:   The patient and I discussed the risks and benefits of chiropractic care. Based on the patient's subjective complaints along with the examination findings, it is advised that a course of chiropractic treatment by initiated. Consent for care was given both written and orally by the patient. The patient tolerated today's treatment with little or no additional discomfort and was instructed to contact the office for questions or concerns.     Treatment Frequency: Will see/treat patient every 2-4 weeks as therapeutic benefit is sustained, then frequency will be reduced to as needed for symptom management or as needed for acute flare-ups/exacerbation.     Please note: Voice-to-text software was used when completing this note.  While the note was proofread, portions may include grammatical errors.   Please contact me with any questions/concerns as it relates to these types of errors.

## 2023-12-04 ENCOUNTER — OFFICE VISIT (OUTPATIENT)
Dept: OTOLARYNGOLOGY | Facility: CLINIC | Age: 68
End: 2023-12-04
Payer: MEDICARE

## 2023-12-04 VITALS
HEIGHT: 63 IN | DIASTOLIC BLOOD PRESSURE: 90 MMHG | SYSTOLIC BLOOD PRESSURE: 144 MMHG | WEIGHT: 155 LBS | TEMPERATURE: 97.2 F | BODY MASS INDEX: 27.46 KG/M2

## 2023-12-04 DIAGNOSIS — R22.0 MASS OF RIGHT SUBMANDIBULAR REGION: Primary | ICD-10-CM

## 2023-12-04 PROCEDURE — 99213 OFFICE O/P EST LOW 20 MIN: CPT | Performed by: OTOLARYNGOLOGY

## 2023-12-04 PROCEDURE — 1160F RVW MEDS BY RX/DR IN RCRD: CPT | Performed by: OTOLARYNGOLOGY

## 2023-12-04 PROCEDURE — 10021 FNA BX W/O IMG GDN 1ST LES: CPT | Performed by: OTOLARYNGOLOGY

## 2023-12-04 PROCEDURE — 88112 CYTOPATH CELL ENHANCE TECH: CPT

## 2023-12-04 PROCEDURE — 88112 CYTOPATH CELL ENHANCE TECH: CPT | Performed by: PATHOLOGY

## 2023-12-04 PROCEDURE — 1159F MED LIST DOCD IN RCRD: CPT | Performed by: OTOLARYNGOLOGY

## 2023-12-04 ASSESSMENT — PATIENT HEALTH QUESTIONNAIRE - PHQ9
1. LITTLE INTEREST OR PLEASURE IN DOING THINGS: NOT AT ALL
SUM OF ALL RESPONSES TO PHQ9 QUESTIONS 1 AND 2: 0
2. FEELING DOWN, DEPRESSED OR HOPELESS: NOT AT ALL

## 2023-12-04 NOTE — PROGRESS NOTES
ENT Outpatient Consultation    Chief Complaint: right submandibular mass  History Of Present Illness  Yoana Herzog is a 68 y.o. female presents for evaluation of a right submandibular mass that was noted by her dentist back in September.  She has not noticed any significant change since that time.  She is asymptomatic.  She denies history of skin cancer.  She denies history of recurrent swelling or pain on that side.  She can feel pressure radiating up to her ear when she pushes on it.  She has not had any imaging    12/4/23: Returns today for follow-up.  CT scan showed an ovoid mass adjacent to and possibly involving the submandibular gland on the right-hand side.  She is here today accompanied with her daughter.  Plan to perform an FNA     Past Medical History  She has a past medical history of Pain in unspecified knee and Pain in unspecified shoulder.    Surgical History  She has a past surgical history that includes Tonsillectomy (04/09/2018); Knee surgery (04/09/2018); and Hysterectomy (04/09/2018).     Social History  She reports that she has never smoked. She uses smokeless tobacco. She reports current alcohol use. She reports that she does not currently use drugs.    Family History  Family History   Problem Relation Name Age of Onset    Stroke Mother      Diabetes Mother      Hypertension Mother      Other (Open Heart Surgery) Mother      Stroke Father          Allergies  Amoxicillin-pot clavulanate, Bee pollen, House dust, and House dust mite     Physical Exam:  CONSTITUTIONAL:  No acute distress  VOICE:  No hoarseness or other abnormality  RESPIRATION:  Breathing comfortably, no stridor  CV:  No clubbing/cyanosis/edema in hands  EYES:  EOM intact, sclera normal  NEURO:  Alert and oriented times 3, Cranial nerves II-XII grossly intact and symmetric bilaterally  HEAD AND FACE:  Symmetric facial features, no masses or lesions, sinuses non-tender to palpation  SALIVARY GLANDS: Palpable mass in the right  submandibular gland, normal salivary outflow. Other glands normal on palpation  EARS:  Normal external ears, external auditory canals, and TMs to otoscopy, normal hearing to whispered voice.  NOSE:  External nose midline, anterior rhinoscopy is normal with limited visualization to the anterior aspect of the interior turbinates, no bleeding or drainage, no lesions  ORAL CAVITY/OROPHARYNX/LIPS:  Normal mucous membranes, normal floor of mouth/tongue/OP, no masses or lesions  PHARYNGEAL WALLS:  No masses or lesions  NECK/LYMPH:  No palpable LAD, no thyroid masses, trachea midline  SKIN:  Neck skin is without scar or injury  PSYCH:  Alert and oriented with appropriate mood and affect    Procedure: Fine-needle aspiration of right submandibular mass    Verbal consent was obtained for FNA of the right submandibular mass.  The skin was injected with local anesthesia.  After this took effect 3 separate passes were made with a 22-gauge needle.  The contents were placed in CytoLyt.  There is no significant bleeding.  Patient tolerated the procedure well     Assessment and Plan  68 y.o. female with right submandibular mass.  CT scan shows a mass either adjacent to or involving the right submandibular gland.  FNA was obtained today    -We reviewed her imaging as well as plans for surgery.  She will be set up for excision of the submandibular gland.  In the event the mass is separate from the gland we will plan on removal of the mass for pathology.  We discussed risks including bleeding, infection, numbness, cranial neuropathies.  All questions were answered and consent was obtained    Hussein Douglass MD

## 2023-12-06 LAB
LABORATORY COMMENT REPORT: NORMAL
LABORATORY COMMENT REPORT: NORMAL
PATH REPORT.FINAL DX SPEC: NORMAL
PATH REPORT.GROSS SPEC: NORMAL
PATH REPORT.RELEVANT HX SPEC: NORMAL
PATH REPORT.TOTAL CANCER: NORMAL

## 2023-12-08 ENCOUNTER — PRE-ADMISSION TESTING (OUTPATIENT)
Dept: PREADMISSION TESTING | Facility: HOSPITAL | Age: 68
End: 2023-12-08
Payer: MEDICARE

## 2023-12-08 VITALS
TEMPERATURE: 97.5 F | BODY MASS INDEX: 27.38 KG/M2 | OXYGEN SATURATION: 97 % | HEIGHT: 63 IN | DIASTOLIC BLOOD PRESSURE: 73 MMHG | SYSTOLIC BLOOD PRESSURE: 127 MMHG | WEIGHT: 154.54 LBS | HEART RATE: 57 BPM | RESPIRATION RATE: 16 BRPM

## 2023-12-08 DIAGNOSIS — Z01.818 PREOP TESTING: Primary | ICD-10-CM

## 2023-12-08 DIAGNOSIS — R22.0 MASS OF RIGHT SUBMANDIBULAR REGION: ICD-10-CM

## 2023-12-08 PROCEDURE — 93010 ELECTROCARDIOGRAM REPORT: CPT | Performed by: INTERNAL MEDICINE

## 2023-12-08 PROCEDURE — 99203 OFFICE O/P NEW LOW 30 MIN: CPT | Performed by: NURSE PRACTITIONER

## 2023-12-08 PROCEDURE — 93005 ELECTROCARDIOGRAM TRACING: CPT

## 2023-12-08 RX ORDER — CALCIUM CARBONATE 500(1250)
1 TABLET ORAL
COMMUNITY

## 2023-12-08 RX ORDER — IVERMECTIN 10 MG/G
CREAM TOPICAL DAILY
COMMUNITY

## 2023-12-08 ASSESSMENT — DUKE ACTIVITY SCORE INDEX (DASI)
CAN YOU DO MODERATE WORK AROUND THE HOUSE LIKE VACUUMING, SWEEPING FLOORS OR CARRYING GROCERIES: YES
CAN YOU WALK INDOORS, SUCH AS AROUND YOUR HOUSE: YES
CAN YOU DO YARD WORK LIKE RAKING LEAVES, WEEDING OR PUSHING A MOWER: YES
CAN YOU DO HEAVY WORK AROUND THE HOUSE LIKE SCRUBBING FLOORS OR LIFTING AND MOVING HEAVY FURNITURE: YES
CAN YOU WALK A BLOCK OR TWO ON LEVEL GROUND: YES
CAN YOU CLIMB A FLIGHT OF STAIRS OR WALK UP A HILL: YES
CAN YOU PARTICIPATE IN STRENOUS SPORTS LIKE SWIMMING, SINGLES TENNIS, FOOTBALL, BASKETBALL, OR SKIING: YES
DASI METS SCORE: 9.9
CAN YOU HAVE SEXUAL RELATIONS: YES
TOTAL_SCORE: 58.2
CAN YOU TAKE CARE OF YOURSELF (EAT, DRESS, BATHE, OR USE TOILET): YES
CAN YOU DO LIGHT WORK AROUND THE HOUSE LIKE DUSTING OR WASHING DISHES: YES
CAN YOU PARTICIPATE IN MODERATE RECREATIONAL ACTIVITIES LIKE GOLF, BOWLING, DANCING, DOUBLES TENNIS OR THROWING A BASEBALL OR FOOTBALL: YES
CAN YOU RUN A SHORT DISTANCE: YES

## 2023-12-08 ASSESSMENT — CHADS2 SCORE
AGE GREATER THAN OR EQUAL TO 75: NO
DIABETES: NO
CHF: NO
HYPERTENSION: NO
PRIOR STROKE OR TIA OR THROMBOEMBOLISM: NO
CHADS2 SCORE: 0

## 2023-12-08 ASSESSMENT — ENCOUNTER SYMPTOMS
RESPIRATORY NEGATIVE: 1
GASTROINTESTINAL NEGATIVE: 1
ARTHRALGIAS: 1
BACK PAIN: 1
CARDIOVASCULAR NEGATIVE: 1
PSYCHIATRIC NEGATIVE: 1
CONSTITUTIONAL NEGATIVE: 1
NEUROLOGICAL NEGATIVE: 1
EYES NEGATIVE: 1

## 2023-12-08 ASSESSMENT — PAIN - FUNCTIONAL ASSESSMENT: PAIN_FUNCTIONAL_ASSESSMENT: 0-10

## 2023-12-08 ASSESSMENT — ACTIVITIES OF DAILY LIVING (ADL): ADL_SCORE: 0

## 2023-12-08 ASSESSMENT — PAIN SCALES - GENERAL: PAINLEVEL_OUTOF10: 0 - NO PAIN

## 2023-12-08 ASSESSMENT — LIFESTYLE VARIABLES: SMOKING_STATUS: NONSMOKER

## 2023-12-08 NOTE — PREPROCEDURE INSTRUCTIONS
Medication List            Accurate as of December 8, 2023  9:48 AM. Always use your most recent med list.                ascorbic acid 500 mg tablet  Commonly known as: Vitamin C  Medication Adjustments for Surgery: Stop 7 days before surgery     calcium carbonate 500 mg calcium (1,250 mg) tablet  Commonly known as: Oscal  Medication Adjustments for Surgery: Stop 7 days before surgery     cetirizine 10 mg tablet  Commonly known as: ZyrTEC  Medication Adjustments for Surgery: Continue until night before surgery  Notes to patient: Take day before at normal time     cholecalciferol 25 MCG (1000 UT) tablet  Commonly known as: Vitamin D-3  Medication Adjustments for Surgery: Stop 7 days before surgery     Cloderm 0.1 % cream  Generic drug: clocortolone pivalate  Medication Adjustments for Surgery: Continue until night before surgery     Flonase Allergy Relief 50 mcg/actuation nasal spray  Generic drug: fluticasone  Medication Adjustments for Surgery: Take morning of surgery with sip of water, no other fluids     magnesium oxide 400 mg tablet  Commonly known as: Mag-Ox  Medication Adjustments for Surgery: Take morning of surgery with sip of water, no other fluids     multivitamin tablet  Medication Adjustments for Surgery: Stop 7 days before surgery     Soolantra 1 % cream  Generic drug: ivermectin  Medication Adjustments for Surgery: Continue until night before surgery                        PRE-OPERATIVE INSTRUCTIONS    You will receive notification one business day prior to your surgery to confirm your arrival time and additional information. It is important that you answer your phone and/or check your messages during this time.    Please enter the building through the Outpatient entrance. Take the elevator off the lobby to the 2nd floor and check in at the Outpatient Surgery desk    INSTRUCTIONS:  Talk to your surgeon for instructions if you should stop your aspirin, blood thinner, or diabetes medicines.  DO NOT  take any multivitamins or over the counter supplements for 7-10 days before surgery.  If not being admitted, you must have an adult immediately available to drive you home after surgery. We also highly recommend you have someone stay with you for the entire day and night of your surgery.  For children having surgery, a parent or legal guardian must accompany t hem to the surgery center. If this is not possible, please call [phone] to make additional arrangements.  For adults who are unable to consent or make medical decisions for themselves, a legal guardian or Power of  must accompany them to the surgery center. If this is not possible, please call [phone] to make additional arrangements.  Wear comfortable, loose fitting clothing.  All jewelry and piercings must be removed. If you are unable to remove an item or have a dermal piercing, please be sure to tell the nurse when you arrive for surgery.  Nail polish and make-up must be removed.  Avoid smoking or consuming alcohol for 24 hours before surgery.  To help prevent infection, please take a shower/bath and wash your hair the night before and/or morning of surgery.    Additional instructions about eating and drinking before surgery:  Do not eat any solid foods after midnight. Milk, nutritional drinks/supplements, and infant formula are considered solid foods.  You may drink up to 12 oz. of clear liquids up to 2 hours before your arrival time for surgery, unless directed otherwise by your surgeon. Clear liquids include water, non-carbonated sports drinks (Gatorade), black tea or coffee (no creamers) and breast milk.    If you received a blue folder, please review additional information provided inside the folder regarding additional preparation.     If you have any questions or concerns, please call Pre-Admission Testing at [phone].          Preoperative Bathing instructions    Follow these instructions the evening before and morning of  surgery:  Do not shave the day before or day of surgery.  Remove all jewelry until after surgery. Take off rings and take out all body-piercing jewelry.  Use a clean wash cloth and towel.  Wash your face and hair with your normal soap and shampoo before you use the CHG soap.  Use a wash cloth to clean your skin with the CHG soap. Use enough CHG soap to cover the skin on your whole body. Use the same amount as you would with your normal soap.  Do not use the CHG soap on your face, eyes, ears, or head.  Do not scrub your skin too hard.  Be sure to clean the area well where surgery will be done.  Do not wash with your normal soap after the CHG soap.  Keep away from the water stream when you put CHG soap on. This keeps it from rinsing off too soon.  Rinse your body well.  Pat yourself dry with a clean, soft towel.  Put on clean clothing.  Do not put on any deodorants, lotions, or oils after showering. These might block how the CHG soap works.

## 2023-12-08 NOTE — H&P (VIEW-ONLY)
"CPM/PAT Evaluation       Name: Yoana Herzog (Yoana Herzog)  /Age: 1955/68 y.o.     In-Person       Chief Complaint: \"Lump on neck\"    HPI In September, patient's dentist noticed mass to right upper neck under jaw. Flesh colored. Difficult to palpate. Denies pain or problems swallowing.     Past Medical History:   Diagnosis Date    Pain in unspecified knee     Knee pain    Pain in unspecified shoulder     Shoulder pain       Past Surgical History:   Procedure Laterality Date    HYSTERECTOMY  2018    Hysterectomy    KNEE SURGERY  2018    Knee Surgery    TONSILLECTOMY  2018    Tonsillectomy       Patient  has no history on file for sexual activity.    Family History   Problem Relation Name Age of Onset    Stroke Mother      Diabetes Mother      Hypertension Mother      Other (Open Heart Surgery) Mother      Stroke Father         Allergies   Allergen Reactions    Amoxicillin-Pot Clavulanate GI Upset    Bee Pollen Itching, Headache and Runny nose    House Dust Itching    House Dust Mite Itching and Runny nose       Prior to Admission medications    Medication Sig Start Date End Date Taking? Authorizing Provider   ascorbic acid (Vitamin C) 500 mg tablet Take 1 tablet (500 mg) by mouth once daily.    Historical Provider, MD   cetirizine (ZyrTEC) 10 mg tablet Take by mouth.    Historical Provider, MD   cholecalciferol (Vitamin D-3) 25 MCG (1000 UT) tablet Take by mouth.    Historical Provider, MD   clocortolone pivalate (Cloderm) 0.1 % cream Cloderm 0.1 % External Cream  Refills: 0    Historical Provider, MD   fluticasone (Flonase Allergy Relief) 50 mcg/actuation nasal spray Administer into affected nostril(s).    Historical Provider, MD   ivermectin (Soolantra) 1 % cream Apply topically.    Historical Provider, MD   magnesium oxide (Mag-Ox) 400 mg tablet Take by mouth.    Historical Provider, MD   multivitamin tablet Take 1 tablet by mouth once daily.    Historical Provider, MD   calcium " carbonate/vitamin D3 (CALCIUM 600 + D,3, ORAL) Take by mouth. 1200ui QD  12/8/23  Historical Provider, MD   diclofenac sodium (Voltaren) 1 % gel gel APPLY 1 GM 3 times daily PRN  12/8/23  Historical Provider, MD   fexofenadine-pseudoephedrine (Allegra-D 12 Hour)  mg 12 hr tablet Take 1 tablet by mouth 2 times a day. 3/22/07 12/8/23  Historical Provider, MD   ibuprofen 600 mg tablet Take 1 tablet (600 mg) by mouth.  12/8/23  Historical Provider, MD      Refer to updated medication list on file      Review of Systems   Constitutional: Negative.    HENT:  Positive for congestion.    Eyes: Negative.         Glasses   Respiratory: Negative.     Cardiovascular: Negative.    Gastrointestinal: Negative.    Genitourinary: Negative.    Musculoskeletal:  Positive for arthralgias and back pain.   Skin:         Rosacea  Itching to back, applying medicated cream   Allergic/Immunologic: Positive for environmental allergies.   Neurological: Negative.    Psychiatric/Behavioral: Negative.          Physical Exam  Constitutional:       Appearance: Normal appearance.   HENT:      Head: Normocephalic.   Eyes:      Extraocular Movements: Extraocular movements intact.   Cardiovascular:      Rate and Rhythm: Normal rate and regular rhythm.      Heart sounds: Normal heart sounds.   Pulmonary:      Effort: Pulmonary effort is normal.      Breath sounds: Normal breath sounds.   Abdominal:      General: Bowel sounds are normal.      Palpations: Abdomen is soft.   Musculoskeletal:         General: Normal range of motion.      Cervical back: Normal range of motion.   Skin:     General: Skin is warm and dry.   Neurological:      Mental Status: She is alert and oriented to person, place, and time.   Psychiatric:         Mood and Affect: Mood normal.          PAT AIRWAY:   Airway:     Neck ROM::  Full   Permanent bridge, implants      Anesthesia:  post op N/V    Visit Vitals  /73   Pulse 57   Temp 36.4 °C (97.5 °F) (Temporal)   Resp 16        DASI Risk Score      Flowsheet Row Most Recent Value   DASI SCORE 58.2   METS Score (Will be calculated only when all the questions are answered) 9.9          Caprini DVT Assessment      Flowsheet Row Most Recent Value   DVT Score 5   Current Status Minor surgery planned   History Prior major surgery   Age 60-75 years   BMI 30 or less          Modified Frailty Index      Flowsheet Row Most Recent Value   Modified Frailty Index Calculator 0          CHADS2 Stroke Risk  Current as of 14 minutes ago        N/A 3 - 100%: High Risk   2 - 3%: Medium Risk   0 - 2%: Low Risk     Last Change: N/A          This score determines the patient's risk of having a stroke if the patient has atrial fibrillation.        This score is not applicable to this patient. Components are not calculated.          Revised Cardiac Risk Index      Flowsheet Row Most Recent Value   Revised Cardiac Risk Calculator 0          Apfel Simplified Score    No data to display       Risk Analysis Index Results This Encounter         12/8/2023  0917             NUNEZ Cancer History: Patient does not indicate history of cancer    Total Risk Analysis Index Score Without Cancer: 20    Total Risk Analysis Index Score: 20          Stop Bang Score      Flowsheet Row Most Recent Value   Do you snore loudly? 0   Do you often feel tired or fatigued after your sleep? 0   Has anyone ever observed you stop breathing in your sleep? 0   Do you have or are you being treated for high blood pressure? 0   Recent BMI (Calculated) 27.5   Is BMI greater than 35 kg/m2? 0=No   Age older than 50 years old? 1=Yes   Is your neck circumference greater than 17 inches (Male) or 16 inches (Female)? 0   Gender - Male 0=No   STOP-BANG Total Score 1            Assessment and Plan:     68 year old female for right excision salivary gland 12/20/23  EKG obtained and enclosed, comparison EKG on file Jan 2023. No acute changes noted.  Post op N/V

## 2023-12-14 LAB
ATRIAL RATE: 58 BPM
P AXIS: 22 DEGREES
P OFFSET: 198 MS
P ONSET: 148 MS
PR INTERVAL: 160 MS
Q ONSET: 228 MS
QRS COUNT: 9 BEATS
QRS DURATION: 74 MS
QT INTERVAL: 420 MS
QTC CALCULATION(BAZETT): 412 MS
QTC FREDERICIA: 415 MS
R AXIS: -33 DEGREES
T AXIS: 8 DEGREES
T OFFSET: 438 MS
VENTRICULAR RATE: 58 BPM

## 2023-12-18 ENCOUNTER — OFFICE VISIT (OUTPATIENT)
Dept: PRIMARY CARE | Facility: CLINIC | Age: 68
End: 2023-12-18
Payer: MEDICARE

## 2023-12-18 VITALS
DIASTOLIC BLOOD PRESSURE: 82 MMHG | HEART RATE: 61 BPM | BODY MASS INDEX: 27.04 KG/M2 | TEMPERATURE: 96.6 F | HEIGHT: 63 IN | OXYGEN SATURATION: 96 % | RESPIRATION RATE: 16 BRPM | SYSTOLIC BLOOD PRESSURE: 136 MMHG | WEIGHT: 152.6 LBS

## 2023-12-18 DIAGNOSIS — E55.9 VITAMIN D DEFICIENCY: ICD-10-CM

## 2023-12-18 DIAGNOSIS — Z00.00 ANNUAL PHYSICAL EXAM: Primary | ICD-10-CM

## 2023-12-18 DIAGNOSIS — R22.0 MASS OF RIGHT SUBMANDIBULAR REGION: ICD-10-CM

## 2023-12-18 DIAGNOSIS — E78.5 DYSLIPIDEMIA: ICD-10-CM

## 2023-12-18 PROCEDURE — 99397 PER PM REEVAL EST PAT 65+ YR: CPT | Performed by: INTERNAL MEDICINE

## 2023-12-18 PROCEDURE — 1160F RVW MEDS BY RX/DR IN RCRD: CPT | Performed by: INTERNAL MEDICINE

## 2023-12-18 PROCEDURE — 1159F MED LIST DOCD IN RCRD: CPT | Performed by: INTERNAL MEDICINE

## 2023-12-18 PROCEDURE — 99214 OFFICE O/P EST MOD 30 MIN: CPT | Performed by: INTERNAL MEDICINE

## 2023-12-18 PROCEDURE — 1126F AMNT PAIN NOTED NONE PRSNT: CPT | Performed by: INTERNAL MEDICINE

## 2023-12-18 ASSESSMENT — ENCOUNTER SYMPTOMS
WHEEZING: 0
CONFUSION: 0
VOMITING: 0
ADENOPATHY: 0
ARTHRALGIAS: 0
SORE THROAT: 0
SHORTNESS OF BREATH: 0
DIZZINESS: 0
NAUSEA: 0
WEAKNESS: 0
PALPITATIONS: 0
CONSTIPATION: 0
ABDOMINAL PAIN: 0
CHILLS: 0
SLEEP DISTURBANCE: 0
CHEST TIGHTNESS: 0
UNEXPECTED WEIGHT CHANGE: 0
JOINT SWELLING: 0
FATIGUE: 0
COUGH: 0
DYSURIA: 0
DIARRHEA: 0

## 2023-12-18 NOTE — PROGRESS NOTES
Subjective   Yoana Herzog is a 68 y.o. female who presents for Annual Exam.  Patient is here for Annual Physical Exam, CPE.  Last  Dexa 09.11.23  Mammogram 09.11.23  ECG 12.08.23  Colonoscopy 10.13.22  Patient already had Flu Shot    Pt. Need form (Employee Medical Statement for ) to be completed by Dr Cooper, Pt. Will work part time.  Pt. Has Pre-admission testing on Dec 8th prior to surgery Dec 20. (Lump on her neck is being removed).  Mass of right submandibular region, noticed by dentist then evaluated by ent and had a neg bx of submandibularly gland on right side, to have sx soon.  Consult reports rev.  Will work in  as part time.  Form for employment filled, rev info.        Review of Systems   Constitutional:  Negative for chills, fatigue and unexpected weight change.        Comment   HENT:  Negative for congestion, ear pain and sore throat.    Respiratory:  Negative for cough, chest tightness, shortness of breath and wheezing.    Cardiovascular:  Negative for palpitations and leg swelling.   Gastrointestinal:  Negative for abdominal pain, constipation, diarrhea, nausea and vomiting.   Genitourinary:  Negative for dysuria and urgency.   Musculoskeletal:  Negative for arthralgias and joint swelling.   Skin:  Negative for rash.   Neurological:  Negative for dizziness and weakness.   Hematological:  Negative for adenopathy.   Psychiatric/Behavioral:  Negative for confusion and sleep disturbance.    All other systems reviewed and are negative.    Objective   Physical Exam  Constitutional:       Appearance: Normal appearance.      Comments: 1/1 cm mass mobile right submandibular area   HENT:      Head: Normocephalic and atraumatic.   Eyes:      Conjunctiva/sclera: Conjunctivae normal.   Neck:      Vascular: No carotid bruit.   Cardiovascular:      Rate and Rhythm: Normal rate and regular rhythm.      Heart sounds: No murmur heard.  Pulmonary:      Effort: No respiratory distress.       "Breath sounds: No wheezing, rhonchi or rales.   Chest:      Chest wall: No tenderness.   Abdominal:      General: Bowel sounds are normal. There is no distension.      Palpations: Abdomen is soft. There is no mass.      Tenderness: There is no abdominal tenderness.   Musculoskeletal:         General: Normal range of motion.      Cervical back: Neck supple.   Lymphadenopathy:      Cervical: No cervical adenopathy.   Skin:     Coloration: Skin is not jaundiced.      Findings: No rash.   Neurological:      General: No focal deficit present.      Mental Status: She is alert and oriented to person, place, and time. Mental status is at baseline.      Motor: No weakness.      Gait: Gait normal.   Psychiatric:         Mood and Affect: Mood normal.         Behavior: Behavior normal.         Judgment: Judgment normal.       /82   Pulse 61   Temp 35.9 °C (96.6 °F)   Resp 16   Ht 1.6 m (5' 3\")   Wt 69.2 kg (152 lb 9.6 oz)   SpO2 96%   BMI 27.03 kg/m²     === 11/16/23 ===    CT SOFT TISSUE NECK W IV CONTRAST    - Impression -  Well-defined ovoid enhancing mass within the right submandibular  space measuring up to 2.1 cm. Given apparent coursing of the anterior  facial vein between this lesion and the right submandibular gland,  findings are thought to represent pathologic lymph node though a  primary submandibular mass such as a minor salivary gland tumor can  not be excluded.    No additional suspicious lymph nodes visualized. No definite lesion  within the oral cavity though evaluation is limited given streak  artifact from dental amalgam. Direct inspection is recommended.    Signed by: Alejandra Tomlinson 11/16/2023 10:08 AM  Dictation workstation:   [id number]    Assessment/Plan   Problem List Items Addressed This Visit       Dyslipidemia    Vitamin D deficiency    Annual physical exam - Primary    Mass of right submandibular region       "

## 2023-12-20 ENCOUNTER — ANESTHESIA (OUTPATIENT)
Dept: OPERATING ROOM | Facility: HOSPITAL | Age: 68
End: 2023-12-20
Payer: MEDICARE

## 2023-12-20 ENCOUNTER — ANESTHESIA EVENT (OUTPATIENT)
Dept: OPERATING ROOM | Facility: HOSPITAL | Age: 68
End: 2023-12-20
Payer: MEDICARE

## 2023-12-20 ENCOUNTER — HOSPITAL ENCOUNTER (OUTPATIENT)
Facility: HOSPITAL | Age: 68
Setting detail: OUTPATIENT SURGERY
Discharge: HOME | End: 2023-12-20
Attending: OTOLARYNGOLOGY | Admitting: OTOLARYNGOLOGY
Payer: MEDICARE

## 2023-12-20 VITALS
RESPIRATION RATE: 17 BRPM | TEMPERATURE: 97.2 F | OXYGEN SATURATION: 99 % | HEART RATE: 64 BPM | SYSTOLIC BLOOD PRESSURE: 142 MMHG | DIASTOLIC BLOOD PRESSURE: 72 MMHG

## 2023-12-20 DIAGNOSIS — R22.0 MASS OF RIGHT SUBMANDIBULAR REGION: ICD-10-CM

## 2023-12-20 DIAGNOSIS — G89.18 ACUTE POSTOPERATIVE PAIN: Primary | ICD-10-CM

## 2023-12-20 PROCEDURE — 2500000005 HC RX 250 GENERAL PHARMACY W/O HCPCS: Performed by: OTOLARYNGOLOGY

## 2023-12-20 PROCEDURE — A38510 PR BX/REMV,LYMPH NODE,DEEP CERV: Performed by: STUDENT IN AN ORGANIZED HEALTH CARE EDUCATION/TRAINING PROGRAM

## 2023-12-20 PROCEDURE — 3700000001 HC GENERAL ANESTHESIA TIME - INITIAL BASE CHARGE: Performed by: OTOLARYNGOLOGY

## 2023-12-20 PROCEDURE — 7100000009 HC PHASE TWO TIME - INITIAL BASE CHARGE: Performed by: OTOLARYNGOLOGY

## 2023-12-20 PROCEDURE — 3600000008 HC OR TIME - EACH INCREMENTAL 1 MINUTE - PROCEDURE LEVEL THREE: Performed by: OTOLARYNGOLOGY

## 2023-12-20 PROCEDURE — 2720000007 HC OR 272 NO HCPCS: Performed by: OTOLARYNGOLOGY

## 2023-12-20 PROCEDURE — 2500000004 HC RX 250 GENERAL PHARMACY W/ HCPCS (ALT 636 FOR OP/ED): Performed by: ANESTHESIOLOGIST ASSISTANT

## 2023-12-20 PROCEDURE — 38510 BIOPSY/REMOVAL LYMPH NODES: CPT | Performed by: OTOLARYNGOLOGY

## 2023-12-20 PROCEDURE — 7100000010 HC PHASE TWO TIME - EACH INCREMENTAL 1 MINUTE: Performed by: OTOLARYNGOLOGY

## 2023-12-20 PROCEDURE — 7100000002 HC RECOVERY ROOM TIME - EACH INCREMENTAL 1 MINUTE: Performed by: OTOLARYNGOLOGY

## 2023-12-20 PROCEDURE — 88185 FLOWCYTOMETRY/TC ADD-ON: CPT | Mod: TC | Performed by: OTOLARYNGOLOGY

## 2023-12-20 PROCEDURE — A38510 PR BX/REMV,LYMPH NODE,DEEP CERV: Performed by: ANESTHESIOLOGIST ASSISTANT

## 2023-12-20 PROCEDURE — 88341 IMHCHEM/IMCYTCHM EA ADD ANTB: CPT | Mod: TC,SUR,STJLAB | Performed by: OTOLARYNGOLOGY

## 2023-12-20 PROCEDURE — 88342 IMHCHEM/IMCYTCHM 1ST ANTB: CPT | Performed by: PATHOLOGY

## 2023-12-20 PROCEDURE — 7100000001 HC RECOVERY ROOM TIME - INITIAL BASE CHARGE: Performed by: OTOLARYNGOLOGY

## 2023-12-20 PROCEDURE — 88189 FLOWCYTOMETRY/READ 16 & >: CPT | Performed by: OTOLARYNGOLOGY

## 2023-12-20 PROCEDURE — 2500000004 HC RX 250 GENERAL PHARMACY W/ HCPCS (ALT 636 FOR OP/ED): Performed by: STUDENT IN AN ORGANIZED HEALTH CARE EDUCATION/TRAINING PROGRAM

## 2023-12-20 PROCEDURE — 88341 IMHCHEM/IMCYTCHM EA ADD ANTB: CPT | Performed by: PATHOLOGY

## 2023-12-20 PROCEDURE — 3700000002 HC GENERAL ANESTHESIA TIME - EACH INCREMENTAL 1 MINUTE: Performed by: OTOLARYNGOLOGY

## 2023-12-20 PROCEDURE — 3600000003 HC OR TIME - INITIAL BASE CHARGE - PROCEDURE LEVEL THREE: Performed by: OTOLARYNGOLOGY

## 2023-12-20 PROCEDURE — 2500000005 HC RX 250 GENERAL PHARMACY W/O HCPCS: Performed by: ANESTHESIOLOGIST ASSISTANT

## 2023-12-20 PROCEDURE — 88307 TISSUE EXAM BY PATHOLOGIST: CPT | Performed by: PATHOLOGY

## 2023-12-20 RX ORDER — OXYCODONE HYDROCHLORIDE 5 MG/1
5 TABLET ORAL EVERY 4 HOURS PRN
Status: DISCONTINUED | OUTPATIENT
Start: 2023-12-20 | End: 2023-12-20 | Stop reason: HOSPADM

## 2023-12-20 RX ORDER — FENTANYL CITRATE 50 UG/ML
INJECTION, SOLUTION INTRAMUSCULAR; INTRAVENOUS AS NEEDED
Status: DISCONTINUED | OUTPATIENT
Start: 2023-12-20 | End: 2023-12-20

## 2023-12-20 RX ORDER — ALBUTEROL SULFATE 0.83 MG/ML
2.5 SOLUTION RESPIRATORY (INHALATION) ONCE AS NEEDED
Status: DISCONTINUED | OUTPATIENT
Start: 2023-12-20 | End: 2023-12-20 | Stop reason: HOSPADM

## 2023-12-20 RX ORDER — ONDANSETRON HYDROCHLORIDE 2 MG/ML
4 INJECTION, SOLUTION INTRAVENOUS ONCE AS NEEDED
Status: DISCONTINUED | OUTPATIENT
Start: 2023-12-20 | End: 2023-12-20 | Stop reason: HOSPADM

## 2023-12-20 RX ORDER — MIDAZOLAM HYDROCHLORIDE 1 MG/ML
INJECTION, SOLUTION INTRAMUSCULAR; INTRAVENOUS AS NEEDED
Status: DISCONTINUED | OUTPATIENT
Start: 2023-12-20 | End: 2023-12-20

## 2023-12-20 RX ORDER — HYDROMORPHONE HYDROCHLORIDE 1 MG/ML
0.5 INJECTION, SOLUTION INTRAMUSCULAR; INTRAVENOUS; SUBCUTANEOUS EVERY 5 MIN PRN
Status: DISCONTINUED | OUTPATIENT
Start: 2023-12-20 | End: 2023-12-20 | Stop reason: HOSPADM

## 2023-12-20 RX ORDER — GLYCOPYRROLATE 0.2 MG/ML
INJECTION INTRAMUSCULAR; INTRAVENOUS AS NEEDED
Status: DISCONTINUED | OUTPATIENT
Start: 2023-12-20 | End: 2023-12-20

## 2023-12-20 RX ORDER — LABETALOL HYDROCHLORIDE 5 MG/ML
5 INJECTION, SOLUTION INTRAVENOUS ONCE AS NEEDED
Status: DISCONTINUED | OUTPATIENT
Start: 2023-12-20 | End: 2023-12-20 | Stop reason: HOSPADM

## 2023-12-20 RX ORDER — ONDANSETRON HYDROCHLORIDE 2 MG/ML
INJECTION, SOLUTION INTRAVENOUS AS NEEDED
Status: DISCONTINUED | OUTPATIENT
Start: 2023-12-20 | End: 2023-12-20

## 2023-12-20 RX ORDER — PHENYLEPHRINE 10 MG/250 ML(40 MCG/ML)IN 0.9 % SOD.CHLORIDE INTRAVENOUS
CONTINUOUS PRN
Status: DISCONTINUED | OUTPATIENT
Start: 2023-12-20 | End: 2023-12-20

## 2023-12-20 RX ORDER — CEFAZOLIN SODIUM 2 G/100ML
INJECTION, SOLUTION INTRAVENOUS AS NEEDED
Status: DISCONTINUED | OUTPATIENT
Start: 2023-12-20 | End: 2023-12-20

## 2023-12-20 RX ORDER — DEXAMETHASONE SODIUM PHOSPHATE 100 MG/10ML
INJECTION INTRAMUSCULAR; INTRAVENOUS AS NEEDED
Status: DISCONTINUED | OUTPATIENT
Start: 2023-12-20 | End: 2023-12-20

## 2023-12-20 RX ORDER — TRAMADOL HYDROCHLORIDE 50 MG/1
50 TABLET ORAL EVERY 8 HOURS PRN
Qty: 6 TABLET | Refills: 0 | Status: SHIPPED | OUTPATIENT
Start: 2023-12-20 | End: 2023-12-22

## 2023-12-20 RX ORDER — LIDOCAINE HYDROCHLORIDE 40 MG/ML
INJECTION, SOLUTION RETROBULBAR AS NEEDED
Status: DISCONTINUED | OUTPATIENT
Start: 2023-12-20 | End: 2023-12-20

## 2023-12-20 RX ORDER — METOCLOPRAMIDE HYDROCHLORIDE 5 MG/ML
INJECTION INTRAMUSCULAR; INTRAVENOUS AS NEEDED
Status: DISCONTINUED | OUTPATIENT
Start: 2023-12-20 | End: 2023-12-20

## 2023-12-20 RX ORDER — SUCCINYLCHOLINE/SOD CL,ISO/PF 100 MG/5ML
SYRINGE (ML) INTRAVENOUS AS NEEDED
Status: DISCONTINUED | OUTPATIENT
Start: 2023-12-20 | End: 2023-12-20

## 2023-12-20 RX ORDER — MEPERIDINE HYDROCHLORIDE 50 MG/ML
12.5 INJECTION INTRAMUSCULAR; INTRAVENOUS; SUBCUTANEOUS EVERY 10 MIN PRN
Status: DISCONTINUED | OUTPATIENT
Start: 2023-12-20 | End: 2023-12-20 | Stop reason: HOSPADM

## 2023-12-20 RX ORDER — SODIUM CHLORIDE, SODIUM LACTATE, POTASSIUM CHLORIDE, CALCIUM CHLORIDE 600; 310; 30; 20 MG/100ML; MG/100ML; MG/100ML; MG/100ML
100 INJECTION, SOLUTION INTRAVENOUS CONTINUOUS
Status: DISCONTINUED | OUTPATIENT
Start: 2023-12-20 | End: 2023-12-20 | Stop reason: HOSPADM

## 2023-12-20 RX ORDER — LIDOCAINE HYDROCHLORIDE AND EPINEPHRINE 10; 10 MG/ML; UG/ML
INJECTION, SOLUTION INFILTRATION; PERINEURAL AS NEEDED
Status: DISCONTINUED | OUTPATIENT
Start: 2023-12-20 | End: 2023-12-20 | Stop reason: HOSPADM

## 2023-12-20 RX ORDER — FENTANYL CITRATE 50 UG/ML
25 INJECTION, SOLUTION INTRAMUSCULAR; INTRAVENOUS EVERY 5 MIN PRN
Status: DISCONTINUED | OUTPATIENT
Start: 2023-12-20 | End: 2023-12-20 | Stop reason: HOSPADM

## 2023-12-20 RX ORDER — PHENYLEPHRINE HCL IN 0.9% NACL 1 MG/10 ML
SYRINGE (ML) INTRAVENOUS AS NEEDED
Status: DISCONTINUED | OUTPATIENT
Start: 2023-12-20 | End: 2023-12-20

## 2023-12-20 RX ORDER — PROPOFOL 10 MG/ML
INJECTION, EMULSION INTRAVENOUS AS NEEDED
Status: DISCONTINUED | OUTPATIENT
Start: 2023-12-20 | End: 2023-12-20

## 2023-12-20 RX ORDER — LIDOCAINE HYDROCHLORIDE 10 MG/ML
0.1 INJECTION, SOLUTION EPIDURAL; INFILTRATION; INTRACAUDAL; PERINEURAL ONCE
Status: DISCONTINUED | OUTPATIENT
Start: 2023-12-20 | End: 2023-12-20 | Stop reason: HOSPADM

## 2023-12-20 RX ADMIN — FENTANYL CITRATE 50 MCG: 50 INJECTION, SOLUTION INTRAMUSCULAR; INTRAVENOUS at 07:40

## 2023-12-20 RX ADMIN — Medication 150 MCG: at 07:52

## 2023-12-20 RX ADMIN — HYDROMORPHONE HYDROCHLORIDE 0.5 MG: 1 INJECTION, SOLUTION INTRAMUSCULAR; INTRAVENOUS; SUBCUTANEOUS at 09:29

## 2023-12-20 RX ADMIN — PROPOFOL 170 MG: 10 INJECTION, EMULSION INTRAVENOUS at 07:40

## 2023-12-20 RX ADMIN — Medication 100 MCG: at 07:58

## 2023-12-20 RX ADMIN — LIDOCAINE HYDROCHLORIDE 40 MG: 40 INJECTION, SOLUTION RETROBULBAR; TOPICAL at 08:32

## 2023-12-20 RX ADMIN — Medication 0.43 MCG/KG/MIN: at 07:48

## 2023-12-20 RX ADMIN — DEXAMETHASONE SODIUM PHOSPHATE 10 MG: 10 INJECTION, SOLUTION INTRAMUSCULAR; INTRAVENOUS at 07:54

## 2023-12-20 RX ADMIN — LIDOCAINE HYDROCHLORIDE 60 MG: 40 INJECTION, SOLUTION RETROBULBAR; TOPICAL at 07:40

## 2023-12-20 RX ADMIN — ONDANSETRON 4 MG: 2 INJECTION INTRAMUSCULAR; INTRAVENOUS at 08:31

## 2023-12-20 RX ADMIN — METOCLOPRAMIDE 10 MG: 5 INJECTION, SOLUTION INTRAMUSCULAR; INTRAVENOUS at 07:54

## 2023-12-20 RX ADMIN — REMIFENTANIL HYDROCHLORIDE 0.08 MCG/KG/MIN: 1 INJECTION, POWDER, LYOPHILIZED, FOR SOLUTION INTRAVENOUS at 07:45

## 2023-12-20 RX ADMIN — SODIUM CHLORIDE, SODIUM LACTATE, POTASSIUM CHLORIDE, AND CALCIUM CHLORIDE: 600; 310; 30; 20 INJECTION, SOLUTION INTRAVENOUS at 07:25

## 2023-12-20 RX ADMIN — MIDAZOLAM 2 MG: 1 INJECTION INTRAMUSCULAR; INTRAVENOUS at 07:31

## 2023-12-20 RX ADMIN — HYDROMORPHONE HYDROCHLORIDE 0.5 MG: 1 INJECTION, SOLUTION INTRAMUSCULAR; INTRAVENOUS; SUBCUTANEOUS at 09:20

## 2023-12-20 RX ADMIN — GLYCOPYRROLATE 0.2 MG: 0.2 INJECTION INTRAMUSCULAR; INTRAVENOUS at 07:59

## 2023-12-20 RX ADMIN — Medication 100 MG: at 07:40

## 2023-12-20 RX ADMIN — CEFAZOLIN SODIUM 2 G: 2 INJECTION, SOLUTION INTRAVENOUS at 07:54

## 2023-12-20 RX ADMIN — FENTANYL CITRATE 50 MCG: 50 INJECTION, SOLUTION INTRAMUSCULAR; INTRAVENOUS at 08:36

## 2023-12-20 SDOH — HEALTH STABILITY: MENTAL HEALTH: CURRENT SMOKER: 0

## 2023-12-20 ASSESSMENT — PAIN SCALES - GENERAL
PAINLEVEL_OUTOF10: 3
PAINLEVEL_OUTOF10: 0 - NO PAIN
PAINLEVEL_OUTOF10: 4
PAINLEVEL_OUTOF10: 0 - NO PAIN
PAINLEVEL_OUTOF10: 5 - MODERATE PAIN
PAINLEVEL_OUTOF10: 3
PAINLEVEL_OUTOF10: 2
PAINLEVEL_OUTOF10: 4

## 2023-12-20 ASSESSMENT — PAIN DESCRIPTION - DESCRIPTORS: DESCRIPTORS: SORE

## 2023-12-20 ASSESSMENT — PAIN - FUNCTIONAL ASSESSMENT
PAIN_FUNCTIONAL_ASSESSMENT: 0-10

## 2023-12-20 ASSESSMENT — COLUMBIA-SUICIDE SEVERITY RATING SCALE - C-SSRS
6. HAVE YOU EVER DONE ANYTHING, STARTED TO DO ANYTHING, OR PREPARED TO DO ANYTHING TO END YOUR LIFE?: NO
1. IN THE PAST MONTH, HAVE YOU WISHED YOU WERE DEAD OR WISHED YOU COULD GO TO SLEEP AND NOT WAKE UP?: NO
2. HAVE YOU ACTUALLY HAD ANY THOUGHTS OF KILLING YOURSELF?: NO

## 2023-12-20 NOTE — ANESTHESIA PROCEDURE NOTES
Airway  Date/Time: 12/20/2023 7:41 AM  Urgency: elective    Airway not difficult    Staffing  Performed: KRYSTIN   Authorized by: Rosendo Harris DO    Performed by: KRYSTIN Rivas  Patient location during procedure: OR    Indications and Patient Condition  Indications for airway management: anesthesia  Spontaneous Ventilation: absent  Sedation level: deep  Preoxygenated: yes  Patient position: sniffing  MILS not maintained throughout  Mask difficulty assessment: 1 - vent by mask  Planned trial extubation    Final Airway Details  Final airway type: endotracheal airway      Successful airway: ETT  Cuffed: yes   Successful intubation technique: direct laryngoscopy  Facilitating devices/methods: intubating stylet  Endotracheal tube insertion site: oral  Blade: Angelia  Blade size: #3  ETT size (mm): 7.0  Cormack-Lehane Classification: grade I - full view of glottis  Placement verified by: chest auscultation and capnometry   Cuff volume (mL): 6  Measured from: lips  ETT to lips (cm): 21  Number of attempts at approach: 1

## 2023-12-20 NOTE — OP NOTE
Excision LYMPH NODE BIOPSY, PERIFACIAL (R) Operative Note     Date: 2023  OR Location: [facility] OR    Name: Yoana Herzog, : 1955, Age: 68 y.o., MRN: [id number], Sex: female    Diagnosis  Pre-op Diagnosis     * Mass of right submandibular region [R22.0] Post-op Diagnosis     * Mass of right submandibular region [R22.0]     Procedures  Open excisional lymph node biopsy      Surgeons      * Hussein Douglass - Primary    Resident/Fellow/Other Assistant:  Surgeon(s) and Role: sa    Procedure Summary  Anesthesia: General  ASA: II  Anesthesia Staff: Anesthesiologist: Rosendo Harris DO  C-AA: KRYSTIN Rivas  Estimated Blood Loss: 2mL  Intra-op Medications:   Medication Name Total Dose   lidocaine-epinephrine (Xylocaine W/EPI) 1 %-1:100,000 injection 6 mL              Anesthesia Record               Intraprocedure I/O Totals          Intake    Remifentanil Drip 0.00 mL    The total shown is the total volume documented since Anesthesia Start was filed.    Phenylephrine Drip 0.00 mL    The total shown is the total volume documented since Anesthesia Start was filed.    Total Intake 0 mL          Specimen:   ID Type Source Tests Collected by Time   1 : RIGHT PERIFACIAL LYMPH NODE, LYMPHOMA PROTOCOL Tissue LYMPH NODE EXCISION SURGICAL PATHOLOGY EXAM Hussein Douglass MD 2023 0823        Staff:   Circulator: Lise Turcios, ELIAN; Nya Duran RN  Scrub Person: Herlinda Vegatab; Samantha Lancaster; Ariana Ledezma         Drains and/or Catheters: * None in log *      Findings: Enlarged ovoid mass anatomically separate from the submandibular gland.  This correlated with the mass identified on CT scan.  Clinically appeared to be an enlarged lymph node    Indications: Yoana Herzog is an 68 y.o. female who is having surgery for Mass of right submandibular region [R22.0].     The patient was seen in the preoperative area. The risks, benefits, complications, treatment options, non-operative alternatives,  expected recovery and outcomes were discussed with the patient. The possibilities of reaction to medication, pulmonary aspiration, injury to surrounding structures, bleeding, recurrent infection, the need for additional procedures, failure to diagnose a condition, and creating a complication requiring transfusion or operation were discussed with the patient. The patient concurred with the proposed plan, giving informed consent.  The site of surgery was properly noted/marked if necessary per policy. The patient has been actively warmed in preoperative area. Preoperative antibiotics have been ordered and given within 1 hours of incision. Venous thrombosis prophylaxis have been ordered including bilateral sequential compression devices    Procedure Details: Patient was taken back to the operating room and laid supine on the table.  Timeout was performed, general anesthesia induced, and patient was intubated.  The table was then turned to expose the right neck.  A curvilinear incision was marked and a natural neck crease.  The skin was injected with local anesthesia.  The facial nerve monitor was applied to monitor the marginal branch of the facial nerve.  The neck was then prepped and draped in sterile fashion.  A 15 blade was used to make a skin incision down into the subcutaneous fat.  Bovie cautery was used to dissect through the platysma.  A subplatysmal flap was elevated up to the inferior border of the mandible.  On the CT scan the mass was located anterior to the facial vein abutting the inferior border of the mandible.  The inferior border of the submandibular gland was identified.  The facial vein was then identified and traced superiorly.  The marginal branch of the facial nerve was identified and retracted superiorly.  A submental branch of the facial vein coursed underneath the mass.  This was dissected distally and at this point it was evident that the mass was separate from the submandibular gland.   Therefore it was circumferentially dissected and removed.  Clinically this appeared to be an enlarged lymph node.  Therefore it was sent fresh for lymphoma protocol.  The surgical bed was again inspected and no further mass was identified.  The submandibular gland felt normal on palpation and there is no other enlarged lymph nodes.  Hemostasis was achieved and the surgical bed was thoroughly irrigated.  A small amount of fibrillar was placed and the incision was closed in layers using 3-0 Vicryl for the deep layer and 4-0 Monocryl for the skin.  Mastisol and Steri-Strips were placed.  The patient was then extubated without issue and taken to PACU in stable condition  Complications:  None; patient tolerated the procedure well.    Disposition: PACU - hemodynamically stable.  Condition: stable       Hussein Douglass  Phone Number: [phone]

## 2023-12-20 NOTE — ANESTHESIA POSTPROCEDURE EVALUATION
Patient: Yoana Herzog    Procedure Summary       Date: 12/20/23 Room / Location: [facility] OR 03 / Virtual [facility] OR    Anesthesia Start: 0731 Anesthesia Stop:     Procedure: Excision LYMPH NODE BIOPSY, PERIFACIAL (Right: Neck) Diagnosis:       Mass of right submandibular region      (Mass of right submandibular region [R22.0])    Surgeons: Hussein Douglass MD Responsible Provider: Rosendo Harris DO    Anesthesia Type: general ASA Status: 2            Anesthesia Type: general    Vitals Value Taken Time   /69 12/20/23 0857   Temp 36 °C (96.8 °F) 12/20/23 0857   Pulse 75 12/20/23 0857   Resp 15 12/20/23 0857   SpO2 99 % 12/20/23 0857       Anesthesia Post Evaluation    Patient location during evaluation: PACU  Patient participation: complete - patient participated  Level of consciousness: awake and alert  Pain management: satisfactory to patient  Multimodal analgesia pain management approach  Airway patency: patent  Two or more strategies used to mitigate risk of obstructive sleep apnea  Cardiovascular status: acceptable and hemodynamically stable  Respiratory status: acceptable, face mask, nonlabored ventilation and spontaneous ventilation  Hydration status: euvolemic  Postoperative Nausea and Vomiting: none        No notable events documented.

## 2023-12-20 NOTE — DISCHARGE INSTRUCTIONS
You have Steri-Strips covering your incision.  These will typically fall off on their own within 1 week.  If they are still on after 7 days you can gently soak the Steri-Strips with a wet washcloth and remove them.  The stitches are dissolvable.  If you see them sticking above the skin I will trim them at your follow-up appointment.    You can take Tylenol and ibuprofen for pain.  I also sent some tramadol to your pharmacy in case you need additional medication.

## 2023-12-20 NOTE — ANESTHESIA PREPROCEDURE EVALUATION
Patient: Yoana Herzog    Procedure Information       Date/Time: 12/20/23 0730    Procedure: Excision Salivary Gland (Right)    Location: [facility] OR 03 / [facility]    Surgeons: Hussein Douglass MD          Vitals:    12/20/23 0643   BP: 149/72   Pulse: 61   Resp: 16   Temp: 36.6 °C (97.9 °F)   SpO2: 96%       Past Surgical History:   Procedure Laterality Date    HYSTERECTOMY  04/09/2018    Hysterectomy    KNEE SURGERY  04/09/2018    Knee Surgery    TONSILLECTOMY  04/09/2018    Tonsillectomy     Past Medical History:   Diagnosis Date    Pain in unspecified knee     Knee pain    Pain in unspecified shoulder     Shoulder pain     No current facility-administered medications for this encounter.  Prior to Admission medications    Medication Sig Start Date End Date Taking? Authorizing Provider   ascorbic acid (Vitamin C) 500 mg tablet Take 1 tablet (500 mg) by mouth once daily.   Yes Historical Provider, MD   calcium carbonate (Oscal) 500 mg calcium (1,250 mg) tablet Take 1 tablet (1,250 mg) by mouth 2 times a day with meals.   Yes Historical Provider, MD   cetirizine (ZyrTEC) 10 mg tablet Take 1 tablet (10 mg) by mouth once daily as needed.   Yes Historical Provider, MD   cholecalciferol (Vitamin D-3) 25 MCG (1000 UT) tablet Take 1 tablet (1,000 Units) by mouth once daily.   Yes Historical Provider, MD   fluticasone (Flonase Allergy Relief) 50 mcg/actuation nasal spray Administer into affected nostril(s) if needed.   Yes Historical Provider, MD   ivermectin (Soolantra) 1 % cream Apply topically once daily.   Yes Historical Provider, MD   magnesium oxide (Mag-Ox) 400 mg tablet Take 1 tablet (400 mg) by mouth once daily.   Yes Historical Provider, MD   multivitamin tablet Take 1 tablet by mouth once daily.   Yes Historical Provider, MD   clocortolone pivalate (Cloderm) 0.1 % cream if needed.    Historical Provider, MD     Allergies   Allergen Reactions    Amoxicillin-Pot Clavulanate GI Upset and Nausea/vomiting    Bee  "Pollen Itching, Headache and Runny nose    House Dust Itching    House Dust Mite Itching and Runny nose     Social History     Tobacco Use    Smoking status: Never    Smokeless tobacco: Current   Substance Use Topics    Alcohol use: Yes     Alcohol/week: 1.0 standard drink of alcohol     Types: 1 Glasses of wine per week     Comment: occasional         Chemistry    Lab Results   Component Value Date/Time     08/29/2022 0715    K 3.9 08/29/2022 0715     08/29/2022 0715    CO2 28 08/29/2022 0715    BUN 15 11/06/2023 1522    CREATININE 0.71 11/06/2023 1522    Lab Results   Component Value Date/Time    CALCIUM 9.0 08/29/2022 0715    ALKPHOS 63 08/29/2022 0715    AST 26 08/29/2022 0715    ALT 33 08/29/2022 0715    BILITOT 0.6 08/29/2022 0715          Lab Results   Component Value Date/Time    WBC 5.9 08/29/2022 0715    HGB 12.5 08/29/2022 0715    HCT 40.6 08/29/2022 0715     08/29/2022 0715     No results found for: \"PROTIME\", \"PTT\", \"INR\"  Encounter Date: 12/08/23   ECG 12 lead   Result Value    Ventricular Rate 58    Atrial Rate 58    MT Interval 160    QRS Duration 74    QT Interval 420    QTC Calculation(Bazett) 412    P Axis 22    R Axis -33    T Axis 8    QRS Count 9    Q Onset 228    P Onset 148    P Offset 198    T Offset 438    QTC Fredericia 415    Narrative    Sinus bradycardia  Left axis deviation    Abnormal ECG  No previous ECGs available  Confirmed by Nathaniel Jones ([id number]) on 12/14/2023 9:37:14 PM        [unfilled]    Clinical information reviewed:   Tobacco  Allergies  Meds   Med Hx  Surg Hx  OB Status  Fam Hx  Soc   Hx        NPO Detail:  NPO/Void Status  Carbonhydrate Drink Given Prior to Surgery? : N  Date of Last Liquid: 12/19/23  Time of Last Liquid: 2100  Date of Last Solid: 12/19/23  Time of Last Solid: 2100  Time of Last Void: 0645         Physical Exam    Airway  Mallampati: II     Cardiovascular - normal exam  Rhythm: regular  Rate: normal     Dental - normal exam   "   Pulmonary - normal exam     Abdominal - normal exam  Abdomen: soft             Anesthesia Plan    ASA 2     general     The patient is not a current smoker.  Patient was previously instructed to abstain from smoking on day of procedure.  Patient did not smoke on day of procedure.  Education provided regarding risk of obstructive sleep apnea.  intravenous induction   Anesthetic plan and risks discussed with patient.  Use of blood products discussed with patient who consented to blood products.    Plan discussed with CRNA and CAA.

## 2023-12-27 LAB
CELL COUNT (BLOOD): 51.14 X10*3/UL
CELL POPULATIONS: NORMAL
DIAGNOSIS: NORMAL
FLOW DIFFERENTIAL: NORMAL
FLOW TEST ORDERED: NORMAL
LAB TEST METHOD: NORMAL
NUMBER OF CELLS COLLECTED: NORMAL PER TUBE
PATH REPORT.TOTAL CANCER: NORMAL
SIGNATURE COMMENT: NORMAL
SPECIMEN VIABILITY: NORMAL

## 2023-12-28 ENCOUNTER — LAB (OUTPATIENT)
Dept: LAB | Facility: LAB | Age: 68
End: 2023-12-28
Payer: MEDICARE

## 2023-12-28 DIAGNOSIS — Z00.00 HEALTH CARE MAINTENANCE: ICD-10-CM

## 2023-12-28 DIAGNOSIS — R53.83 OTHER FATIGUE: ICD-10-CM

## 2023-12-28 DIAGNOSIS — E55.9 VITAMIN D DEFICIENCY: ICD-10-CM

## 2023-12-28 LAB
25(OH)D3 SERPL-MCNC: 51 NG/ML (ref 30–100)
ALBUMIN SERPL BCP-MCNC: 4.2 G/DL (ref 3.4–5)
ALP SERPL-CCNC: 61 U/L (ref 33–136)
ALT SERPL W P-5'-P-CCNC: 20 U/L (ref 7–45)
ANION GAP SERPL CALC-SCNC: 11 MMOL/L (ref 10–20)
APPEARANCE UR: CLEAR
AST SERPL W P-5'-P-CCNC: 23 U/L (ref 9–39)
BASOPHILS # BLD AUTO: 0.07 X10*3/UL (ref 0–0.1)
BASOPHILS NFR BLD AUTO: 1.2 %
BILIRUB SERPL-MCNC: 0.4 MG/DL (ref 0–1.2)
BILIRUB UR STRIP.AUTO-MCNC: NEGATIVE MG/DL
BUN SERPL-MCNC: 13 MG/DL (ref 6–23)
CALCIUM SERPL-MCNC: 9 MG/DL (ref 8.6–10.3)
CHLORIDE SERPL-SCNC: 106 MMOL/L (ref 98–107)
CHOLEST SERPL-MCNC: 222 MG/DL (ref 0–199)
CHOLESTEROL/HDL RATIO: 3.1
CO2 SERPL-SCNC: 28 MMOL/L (ref 21–32)
COLOR UR: YELLOW
CREAT SERPL-MCNC: 0.66 MG/DL (ref 0.5–1.05)
EOSINOPHIL # BLD AUTO: 0.15 X10*3/UL (ref 0–0.7)
EOSINOPHIL NFR BLD AUTO: 2.5 %
ERYTHROCYTE [DISTWIDTH] IN BLOOD BY AUTOMATED COUNT: 14.6 % (ref 11.5–14.5)
GFR SERPL CREATININE-BSD FRML MDRD: >90 ML/MIN/1.73M*2
GLUCOSE SERPL-MCNC: 75 MG/DL (ref 74–99)
GLUCOSE UR STRIP.AUTO-MCNC: NEGATIVE MG/DL
HCT VFR BLD AUTO: 43.9 % (ref 36–46)
HCV AB SER QL: NONREACTIVE
HDLC SERPL-MCNC: 71.1 MG/DL
HGB BLD-MCNC: 13.3 G/DL (ref 12–16)
IMM GRANULOCYTES # BLD AUTO: 0.02 X10*3/UL (ref 0–0.7)
IMM GRANULOCYTES NFR BLD AUTO: 0.3 % (ref 0–0.9)
KETONES UR STRIP.AUTO-MCNC: NEGATIVE MG/DL
LAB AP ASR DISCLAIMER: NORMAL
LABORATORY COMMENT REPORT: NORMAL
LDLC SERPL CALC-MCNC: 124 MG/DL
LEUKOCYTE ESTERASE UR QL STRIP.AUTO: ABNORMAL
LYMPHOCYTES # BLD AUTO: 2.1 X10*3/UL (ref 1.2–4.8)
LYMPHOCYTES NFR BLD AUTO: 35.5 %
MAGNESIUM SERPL-MCNC: 2.02 MG/DL (ref 1.6–2.4)
MCH RBC QN AUTO: 25.8 PG (ref 26–34)
MCHC RBC AUTO-ENTMCNC: 30.3 G/DL (ref 32–36)
MCV RBC AUTO: 85 FL (ref 80–100)
MONOCYTES # BLD AUTO: 0.45 X10*3/UL (ref 0.1–1)
MONOCYTES NFR BLD AUTO: 7.6 %
MUCOUS THREADS #/AREA URNS AUTO: ABNORMAL /LPF
NEUTROPHILS # BLD AUTO: 3.12 X10*3/UL (ref 1.2–7.7)
NEUTROPHILS NFR BLD AUTO: 52.9 %
NITRITE UR QL STRIP.AUTO: NEGATIVE
NON HDL CHOLESTEROL: 151 MG/DL (ref 0–149)
NRBC BLD-RTO: 0 /100 WBCS (ref 0–0)
PATH REPORT.FINAL DX SPEC: NORMAL
PATH REPORT.GROSS SPEC: NORMAL
PATH REPORT.RELEVANT HX SPEC: NORMAL
PATH REPORT.TOTAL CANCER: NORMAL
PH UR STRIP.AUTO: 5 [PH]
PLATELET # BLD AUTO: 254 X10*3/UL (ref 150–450)
POTASSIUM SERPL-SCNC: 4.2 MMOL/L (ref 3.5–5.3)
PROT SERPL-MCNC: 6.5 G/DL (ref 6.4–8.2)
PROT UR STRIP.AUTO-MCNC: NEGATIVE MG/DL
RBC # BLD AUTO: 5.16 X10*6/UL (ref 4–5.2)
RBC # UR STRIP.AUTO: ABNORMAL /UL
RBC #/AREA URNS AUTO: ABNORMAL /HPF
SODIUM SERPL-SCNC: 141 MMOL/L (ref 136–145)
SP GR UR STRIP.AUTO: 1.01
SQUAMOUS #/AREA URNS AUTO: ABNORMAL /HPF
TRIGL SERPL-MCNC: 135 MG/DL (ref 0–149)
TSH SERPL-ACNC: 2.15 MIU/L (ref 0.44–3.98)
UROBILINOGEN UR STRIP.AUTO-MCNC: <2 MG/DL
VIT B12 SERPL-MCNC: 777 PG/ML (ref 211–911)
VLDL: 27 MG/DL (ref 0–40)
WBC # BLD AUTO: 5.9 X10*3/UL (ref 4.4–11.3)
WBC #/AREA URNS AUTO: ABNORMAL /HPF

## 2023-12-28 PROCEDURE — 80061 LIPID PANEL: CPT

## 2023-12-28 PROCEDURE — 81001 URINALYSIS AUTO W/SCOPE: CPT

## 2023-12-28 PROCEDURE — 84443 ASSAY THYROID STIM HORMONE: CPT

## 2023-12-28 PROCEDURE — 85025 COMPLETE CBC W/AUTO DIFF WBC: CPT

## 2023-12-28 PROCEDURE — 82607 VITAMIN B-12: CPT

## 2023-12-28 PROCEDURE — 36415 COLL VENOUS BLD VENIPUNCTURE: CPT

## 2023-12-28 PROCEDURE — 80053 COMPREHEN METABOLIC PANEL: CPT

## 2023-12-28 PROCEDURE — 82306 VITAMIN D 25 HYDROXY: CPT

## 2023-12-28 PROCEDURE — 86803 HEPATITIS C AB TEST: CPT

## 2023-12-28 PROCEDURE — 83735 ASSAY OF MAGNESIUM: CPT

## 2023-12-29 DIAGNOSIS — C82.01 GRADE 1 FOLLICULAR LYMPHOMA OF LYMPH NODES OF NECK (MULTI): ICD-10-CM

## 2024-01-02 ENCOUNTER — APPOINTMENT (OUTPATIENT)
Dept: INTEGRATIVE MEDICINE | Facility: CLINIC | Age: 69
End: 2024-01-02
Payer: MEDICARE

## 2024-01-07 NOTE — PROGRESS NOTES
Patient ID: Yoana Herzog is a 68 y.o. female.  Referring Physician: Hussein Douglass MD  [id number]  [place] [address]  Primary Care Provider: Valdez Cooper MD      Subjective    HPI  Ms. Yoana Herzog is a 67 y/o F presenting for initial consultation at the request of Dr. Douglass, for new follicular lymphoma.    Patient initially presented with a right submandibular mass that was found by her dentist in September 2023. She underwent a CT of the neck on 11/16/2023, which did show a well-defined ovoid enhancing mass within the right submandibular space measuring up to 2.1 cm. No other additional suspicious lymph nodes visualized within the oral cavity. She underwent a right perifacial lymph node excision on 12/20/2023 with Dr. Douglass and was found to have classic follicular lymphoma, grade 1-2. Blood work on 12/28/2023 shows no cytopenias. TSH normal. CMP unremarkable.     Patient reports that she currently feels well with no symptoms. Denies any recurrent infections, night sweats or fevers. Denies any new chest pain, cough or shortness of breath. No new GI issues. Denies any new rashes. No complaints today. Patient has 12 healthy children.     Patient's past medical history, surgical history, family history and social history reviewed. She has 12 children  Past Medical History:   Diagnosis Date    Pain in unspecified knee     Knee pain    Pain in unspecified shoulder     Shoulder pain       Past Surgical History:   Procedure Laterality Date    HYSTERECTOMY  04/09/2018    Hysterectomy    KNEE SURGERY  04/09/2018    Knee Surgery    TONSILLECTOMY  04/09/2018    Tonsillectomy     Family History   Problem Relation Name Age of Onset    Stroke Mother Destini Scott     Diabetes Mother Destini Scott     Hypertension Mother Destini Scott     Other (Open Heart Surgery) Mother Destini Scott     Stroke Father      Colon cancer Other Maternal Aunt     She has 12 children,  stefania    Objective   Vitals:    01/08/24 1522   BP: 148/80   Pulse: 82   Resp: 16   Temp: 36.1 °C (97 °F)   SpO2: 94%      Review of Systems:   Review of Systems:    Positive per HPI, otherwise negative.     Physical Exam:   Constitutional: Patient appears in no acute distress.   Sitting comfortably in chair.  Eyes: EOMI, clear sclera  ENMT: mucous membranes moist, no apparent injury  Head/Neck: Neck supple, no JVD  Respiratory/Thorax: Patent airways, CTAB, normal  breath sounds, no increased work of breathing  Cardiovascular: Regular, rate and rhythm, no murmurs  Gastrointestinal: Nondistended, soft, non-tender,  no rebound tenderness or guarding, no masses palpable  Extremities: normal extremities, no cyanosis edema,  no swelling  Neurological: alert and oriented x3, nonfocal, normal  speech and hearing  Lymphatic: No palpable lymphadenopathy in cervical,  axillary  lymph nodes.  Spleen appears normal size.  Psychological: Appropriate mood and behavior, normal  affect  Skin: Warm and dry, no lesions, no rashes     Performance Status:  Symptomatic; fully ambulatory    Labs/Imaging/Pathology: personally reviewed reports and images in Epic electronic medical record system. Pertinent results as it related to the plan represented in below in assessment and plan.     Assessment/Plan    1. 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 Douglass from dental after finding a new mass in her right neck.   - We discussed excision biopsy consistent with classic follicular lymphoma grade 1-2.   - We discussed we will plan for a CT C/A/P to complete staging.  - If there is more lymphadenopathy than expected we could consider a PET CT to ensure there is not another area that is more FDG avid.  - Recent CBC and CMP unremarkable.   - I do not suspect bone marrow involvement.   - We will plan for a phone visit on 1/25/24 end of day.   - I will discuss at Tumor  Board if there is any role for any radiation therapy.   - RTC for phone visit on 1/25/24 with scans on 1/22/24.     RTC for phone visit on 1/25/24 with scans on 1/22/24. This note has been transcribed using a medical scribe and there is a possibility of unintentional typing misprints.     Diagnoses and all orders for this visit:  Grade 1 follicular lymphoma of lymph nodes of neck (CMS/HCC)  -     Referral to Malignant Hematology (Hematology / Oncology)  -     CT chest abdomen pelvis w IV contrast; Future  -     Tumor Board Request; Future    Uma Stevenson MD  Hematology/Oncology  [facility]    Scribe Attestation  By signing my name below, IMira Scribe attest that this documentation has been prepared under the direction and in the presence of Uma Stevenson MD.

## 2024-01-08 ENCOUNTER — OFFICE VISIT (OUTPATIENT)
Dept: OTOLARYNGOLOGY | Facility: CLINIC | Age: 69
End: 2024-01-08
Payer: MEDICARE

## 2024-01-08 ENCOUNTER — OFFICE VISIT (OUTPATIENT)
Dept: HEMATOLOGY/ONCOLOGY | Facility: CLINIC | Age: 69
End: 2024-01-08
Payer: MEDICARE

## 2024-01-08 VITALS
TEMPERATURE: 97 F | OXYGEN SATURATION: 94 % | SYSTOLIC BLOOD PRESSURE: 148 MMHG | HEART RATE: 82 BPM | BODY MASS INDEX: 27.3 KG/M2 | DIASTOLIC BLOOD PRESSURE: 80 MMHG | RESPIRATION RATE: 16 BRPM | WEIGHT: 154.1 LBS

## 2024-01-08 VITALS — HEIGHT: 63 IN | TEMPERATURE: 96.3 F | BODY MASS INDEX: 27.42 KG/M2 | WEIGHT: 154.76 LBS

## 2024-01-08 DIAGNOSIS — C82.01 GRADE 1 FOLLICULAR LYMPHOMA OF LYMPH NODES OF NECK (MULTI): ICD-10-CM

## 2024-01-08 DIAGNOSIS — R22.0 MASS OF RIGHT SUBMANDIBULAR REGION: Primary | ICD-10-CM

## 2024-01-08 PROCEDURE — 1160F RVW MEDS BY RX/DR IN RCRD: CPT | Performed by: INTERNAL MEDICINE

## 2024-01-08 PROCEDURE — 1125F AMNT PAIN NOTED PAIN PRSNT: CPT | Performed by: OTOLARYNGOLOGY

## 2024-01-08 PROCEDURE — 1126F AMNT PAIN NOTED NONE PRSNT: CPT | Performed by: INTERNAL MEDICINE

## 2024-01-08 PROCEDURE — 1159F MED LIST DOCD IN RCRD: CPT | Performed by: INTERNAL MEDICINE

## 2024-01-08 PROCEDURE — 99024 POSTOP FOLLOW-UP VISIT: CPT | Performed by: OTOLARYNGOLOGY

## 2024-01-08 PROCEDURE — 1160F RVW MEDS BY RX/DR IN RCRD: CPT | Performed by: OTOLARYNGOLOGY

## 2024-01-08 PROCEDURE — 99203 OFFICE O/P NEW LOW 30 MIN: CPT | Performed by: INTERNAL MEDICINE

## 2024-01-08 PROCEDURE — 1159F MED LIST DOCD IN RCRD: CPT | Performed by: OTOLARYNGOLOGY

## 2024-01-08 PROCEDURE — 99213 OFFICE O/P EST LOW 20 MIN: CPT | Performed by: INTERNAL MEDICINE

## 2024-01-08 ASSESSMENT — PATIENT HEALTH QUESTIONNAIRE - PHQ9
SUM OF ALL RESPONSES TO PHQ9 QUESTIONS 1 & 2: 0
1. LITTLE INTEREST OR PLEASURE IN DOING THINGS: NOT AT ALL
2. FEELING DOWN, DEPRESSED OR HOPELESS: NOT AT ALL

## 2024-01-08 ASSESSMENT — PAIN SCALES - GENERAL: PAINLEVEL: 0-NO PAIN

## 2024-01-08 NOTE — LETTER
January 8, 2024     Valdez Cooper MD  [id number] [address]  [address]  [facility] [address]    Patient: Yoana Herzog   YOB: 1955   Date of Visit: 1/8/2024       Dear Dr. Valdez Cooper MD:    Thank you for referring Yoana Herzog to me for evaluation. Below are my notes for this consultation.  If you have questions, please do not hesitate to call me. I look forward to following your patient along with you.       Sincerely,     Hussein Douglass MD      CC: No Recipients  ______________________________________________________________________________________    ENT Outpatient Consultation    Chief Complaint: right submandibular mass  History Of Present Illness  Yoana Herzog is a 68 y.o. female presents for evaluation of a right submandibular mass that was noted by her dentist back in September.  She has not noticed any significant change since that time.  She is asymptomatic.  She denies history of skin cancer.  She denies history of recurrent swelling or pain on that side.  She can feel pressure radiating up to her ear when she pushes on it.  She has not had any imaging    12/4/23: Returns today for follow-up.  CT scan showed an ovoid mass adjacent to and possibly involving the submandibular gland on the right-hand side.  She is here today accompanied with her daughter.  Plan to perform an FNA    1/8/2024: Patient returns for follow-up.  Final pathology did confirm lymphoma.  She has appointment today with Dr. Stevenson.     Past Medical History  She has a past medical history of Pain in unspecified knee and Pain in unspecified shoulder.    Surgical History  She has a past surgical history that includes Tonsillectomy (04/09/2018); Knee surgery (04/09/2018); and Hysterectomy (04/09/2018).     Social History  She reports that she has never smoked. She uses smokeless tobacco. She reports current alcohol use of about 1.0 standard drink of alcohol per week. She reports that she does not use  drugs.    Family History  Family History   Problem Relation Name Age of Onset   • Stroke Mother Destini Chavez    • Diabetes Mother Destini Chavez    • Hypertension Mother Destini Chavez    • Other (Open Heart Surgery) Mother Destini Chavez    • Stroke Father          Allergies  Amoxicillin-pot clavulanate, Bee pollen, House dust, and House dust mite     Physical Exam:  Neck incision well-healed.  Suture trimmed  No concern for fluid collection or infection  Mild marginal branch weakness which is improved from PACU     Assessment and Plan  68 y.o. female with right submandibular mass.  CT scan shows a mass either adjacent to or involving the right submandibular gland.  This was excised and final pathology is consistent with lymphoma    -Follow-up with Dr. Stevenson as scheduled.  Anticipate the nerve will fully recover based on intraoperative findings  -Follow-up with me as needed    Hussein Douglass MD

## 2024-01-08 NOTE — PROGRESS NOTES
ENT Outpatient Consultation    Chief Complaint: right submandibular mass  History Of Present Illness  Yoana eHrzog is a 68 y.o. female presents for evaluation of a right submandibular mass that was noted by her dentist back in September.  She has not noticed any significant change since that time.  She is asymptomatic.  She denies history of skin cancer.  She denies history of recurrent swelling or pain on that side.  She can feel pressure radiating up to her ear when she pushes on it.  She has not had any imaging    12/4/23: Returns today for follow-up.  CT scan showed an ovoid mass adjacent to and possibly involving the submandibular gland on the right-hand side.  She is here today accompanied with her daughter.  Plan to perform an FNA    1/8/2024: Patient returns for follow-up.  Final pathology did confirm lymphoma.  She has appointment today with Dr. Stevenson.     Past Medical History  She has a past medical history of Pain in unspecified knee and Pain in unspecified shoulder.    Surgical History  She has a past surgical history that includes Tonsillectomy (04/09/2018); Knee surgery (04/09/2018); and Hysterectomy (04/09/2018).     Social History  She reports that she has never smoked. She uses smokeless tobacco. She reports current alcohol use of about 1.0 standard drink of alcohol per week. She reports that she does not use drugs.    Family History  Family History   Problem Relation Name Age of Onset    Stroke Mother Destini Chavez     Diabetes Mother Destini Scott     Hypertension Mother Destini Scott     Other (Open Heart Surgery) Mother Destini Scott     Stroke Father          Allergies  Amoxicillin-pot clavulanate, Bee pollen, House dust, and House dust mite     Physical Exam:  Neck incision well-healed.  Suture trimmed  No concern for fluid collection or infection  Mild marginal branch weakness which is improved from PACU     Assessment and Plan  68 y.o. female with right submandibular mass.  CT scan  shows a mass either adjacent to or involving the right submandibular gland.  This was excised and final pathology is consistent with lymphoma    -Follow-up with Dr. Stevenson as scheduled.  Anticipate the nerve will fully recover based on intraoperative findings  -Follow-up with me as needed    Hussein Douglass MD

## 2024-01-10 ENCOUNTER — ALLIED HEALTH (OUTPATIENT)
Dept: INTEGRATIVE MEDICINE | Facility: CLINIC | Age: 69
End: 2024-01-10
Payer: MEDICARE

## 2024-01-10 DIAGNOSIS — M99.01 SEGMENTAL AND SOMATIC DYSFUNCTION OF CERVICAL REGION: ICD-10-CM

## 2024-01-10 DIAGNOSIS — M99.02 SEGMENTAL AND SOMATIC DYSFUNCTION OF THORACIC REGION: ICD-10-CM

## 2024-01-10 DIAGNOSIS — G89.29 CHRONIC RIGHT-SIDED LOW BACK PAIN WITHOUT SCIATICA: ICD-10-CM

## 2024-01-10 DIAGNOSIS — M99.00 SEGMENTAL AND SOMATIC DYSFUNCTION OF HEAD REGION: Primary | ICD-10-CM

## 2024-01-10 DIAGNOSIS — M99.04 SEGMENTAL AND SOMATIC DYSFUNCTION OF SACRAL REGION: ICD-10-CM

## 2024-01-10 DIAGNOSIS — M99.03 SEGMENTAL AND SOMATIC DYSFUNCTION OF LUMBAR REGION: ICD-10-CM

## 2024-01-10 DIAGNOSIS — M54.2 NECK PAIN: ICD-10-CM

## 2024-01-10 DIAGNOSIS — M54.50 CHRONIC RIGHT-SIDED LOW BACK PAIN WITHOUT SCIATICA: ICD-10-CM

## 2024-01-10 DIAGNOSIS — M47.812 CERVICAL SPONDYLOSIS WITHOUT MYELOPATHY: ICD-10-CM

## 2024-01-10 DIAGNOSIS — M79.10 MYALGIA: ICD-10-CM

## 2024-01-10 DIAGNOSIS — M79.9 POSTURAL STRAIN: ICD-10-CM

## 2024-01-10 DIAGNOSIS — M53.3 SACRAL BACK PAIN: ICD-10-CM

## 2024-01-10 DIAGNOSIS — M47.816 LUMBAR SPONDYLOSIS: ICD-10-CM

## 2024-01-10 DIAGNOSIS — M99.05 SEGMENTAL AND SOMATIC DYSFUNCTION OF PELVIC REGION: ICD-10-CM

## 2024-01-10 PROCEDURE — 98942 CHIROPRACTIC MANJ 5 REGIONS: CPT | Performed by: CHIROPRACTOR

## 2024-01-10 ASSESSMENT — ENCOUNTER SYMPTOMS
NAUSEA: 0
AGITATION: 0
ABDOMINAL PAIN: 0
ALLERGIC/IMMUNOLOGIC COMMENTS: +SEASONAL ALLERGIES
DIFFICULTY URINATING: 0
SHORTNESS OF BREATH: 0
CHILLS: 0
CONFUSION: 0
FEVER: 0
FACIAL ASYMMETRY: 0

## 2024-01-10 NOTE — PROGRESS NOTES
Subjective   Patient ID: Yoana Herzog is a 68 y.o. female who presents today for the treatment of pain involving their left sided neck and right sided hip/sacrum pain.    This is visit 1 of the 2024 calendar year. Medicare.    Fall risk: None. No falls in the last 6 months.     HPI -she presents today with the same chief complaint of left greater than right trapezius and neck pain, and right greater than left sacral/lower back pains.  Nothing is acutely exacerbated, however she is anticipating a trip to [place] with her family in about a week and a half.  She wants to make sure she can move and hitting with her children and grandchildren for this trip.  No new symptoms.    Additional medical information: R lymph node removed 12/20/23, Dx with follicular lymphoma. Had right submandibular mass that was found by her dentist in September 2023. Following with Oncology/Hematology Dr. Stevenson.      Patient describes the pain as achiness and tingling, and rates the current pain 4 out of 10 (10 being the worst).     Last treatment visit 11/28/23: no acute pain issues today, however the left neck has been bothering her more and the right sacrum has been a little more stiff than usual.  She has been participating in the cooking for [employer] and has recently finished decorating her home for [employer].  She also mentions that she is recovering from a head cold which has contributed to some lack of energy and physical movement over the last week.    10/23/23: Yoana presents today with the same complaints of left sided neck and right side hip/sacrum pain. She has been doing well since our last encounter as she would like to focus treatment on the same regions as it has been beneficial for her. 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 No additional injuries or changes in her medical history.    9/26/23:  She would like to continue to focus treatment on the left greater than right upper trapezius and neck  [facility]. 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 Therefore she has been doing a lot of bending lifting and reaching. She has not been doing this consecutive days so she has days in between for recovery and rest. She also continues to have soreness and tightness in the right sacrum which is another chronic area that has regular exacerbations and flareups.      INITIAL INTAKE/SUBJECTIVE 12/12/22: She has seen chiropractic in the past. Her last adjustment was about 5 weeks ago. Is not that she is dissatisfied with the care she has been given, but she is hoping that our treatments will help her completely resolve the issue to the point where she does not need regular care.     Today her primary complaint is left neck and shoulder pain. 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 She also enjoys sewing and notices this pain does get worse after she has been doing a lot of sewing. She will occasionally feel tingling into the left hand most notably in the #3 through 5 fingers. She does have some headaches but these are localized to the suboccipital region and do not radiate into the forehead eyes or temples. In the past her physical therapist and chiropractors have done manual stretching of the left shoulder and done adjustments which have helped.     Her secondary pain complaints are in the right hip glued and sacral pains. She does sleep with a pillow between the knees. She is unsure why but the last 6 to 12 months this has been getting progressively worse. She will occasionally have pain in the hamstring region but this is rare. She will occasionally feel sharp intense pain in the hip or the lower back but this only typically lasts a few seconds. Generally is more of a dull aching pain. The point of max tenderness is localized to the middle of the right butt cheek. No  one has worked on this or massage it in the past.    Review of Systems   Constitutional:  Negative for chills and fever.   HENT:  Negative for congestion and sneezing.    Eyes:  Negative for visual disturbance.   Respiratory:  Negative for shortness of breath.    Cardiovascular:  Negative for chest pain.   Gastrointestinal:  Negative for abdominal pain and nausea.   Endocrine: Negative for polyuria.   Genitourinary:  Negative for difficulty urinating.        2004 Hysterectomy   Musculoskeletal:         +Osteopenia, BL knee surgeries 1960/1970.   Skin:         +Rosacea    Allergic/Immunologic: Positive for environmental allergies.        +seasonal allergies   Neurological:  Negative for facial asymmetry.   Psychiatric/Behavioral:  Negative for agitation and confusion.      Objective   Observation : normal gait and forward head posture    Physical Exam  Examination findings (palpation & ROM): Tenderness palpated over the UT and levator scapulae on the left. Hypertonicity palpated over the right piriformis, right SIJ, right gluteal regions.    Segmental joint dysfunction was identified in the following areas using motion palpation and/or pain provocation assessment:  Cervical: C0-C3 (Supine)   Thoracic: T4-T9 (Prone)  Lumbar: L3-L5 (Scoop)  Sacrum: L5/S1 and right SI (Drop)    Technique Used: diversified CMT, pelvic drop table technique and manual traction.      Today's treatment:  Performed spinal manipulation to the regions of segmental dysfunction identified on examination using age-appropriate and injury specific force, and manual diversified technique.     Brief Seated IASTM and supine MFR to the L>R neck, upper trapezius and L shoulder region. Side-lying and prone MFR, IC to the R SIJ, R piriformis, and R gluteals.         Treatment Plan:   The patient and I discussed the risks and benefits of chiropractic care. Based on the patient's subjective complaints along with the examination findings, it is advised that a  course of chiropractic treatment by initiated. Consent for care was given both written and orally by the patient. The patient tolerated today's treatment with little or no additional discomfort and was instructed to contact the office for questions or concerns.     Treatment Frequency: Will see/treat patient every 2-4 weeks as therapeutic benefit is sustained, then frequency will be reduced to as needed for symptom management or as needed for acute flare-ups/exacerbation.     Please note: Voice-to-text software was used when completing this note.  While the note was proofread, portions may include grammatical errors.  Please contact me with any questions/concerns as it relates to these types of errors.

## 2024-01-15 ENCOUNTER — APPOINTMENT (OUTPATIENT)
Dept: PRIMARY CARE | Facility: CLINIC | Age: 69
End: 2024-01-15
Payer: MEDICARE

## 2024-01-19 ENCOUNTER — APPOINTMENT (OUTPATIENT)
Dept: HEMATOLOGY/ONCOLOGY | Facility: CLINIC | Age: 69
End: 2024-01-19
Payer: MEDICARE

## 2024-01-22 ENCOUNTER — HOSPITAL ENCOUNTER (OUTPATIENT)
Dept: RADIOLOGY | Facility: HOSPITAL | Age: 69
Discharge: HOME | End: 2024-01-22
Payer: MEDICARE

## 2024-01-22 DIAGNOSIS — C82.01 GRADE 1 FOLLICULAR LYMPHOMA OF LYMPH NODES OF NECK (MULTI): ICD-10-CM

## 2024-01-22 PROCEDURE — 71260 CT THORAX DX C+: CPT | Performed by: RADIOLOGY

## 2024-01-22 PROCEDURE — 2550000001 HC RX 255 CONTRASTS: Performed by: INTERNAL MEDICINE

## 2024-01-22 PROCEDURE — 71260 CT THORAX DX C+: CPT

## 2024-01-22 PROCEDURE — 74177 CT ABD & PELVIS W/CONTRAST: CPT | Performed by: RADIOLOGY

## 2024-01-22 RX ADMIN — IOHEXOL 75 ML: 350 INJECTION, SOLUTION INTRAVENOUS at 08:38

## 2024-01-22 NOTE — PROGRESS NOTES
Consent:  Verbal consent was requested and obtained from patient on this date for a telehealth visit.    Patient ID: Yoana Herzog is a 68 y.o. female.    Subjective    HPI  A telephone visit (audio only) between the patient at home and the provider at [facility] [place] was utilized to provide this   telehealth service.    Ms. Yoana Herzog is a 69 y/o F presenting for follow-up for follicular lymphoma.     Since last visit had CT C/a/p 1/22/24 with some mildly enlarged LAD. No new B symptoms.         Patient's past medical history, surgical history, family history and social history reviewed.     Objective    BSA: There is no height or weight on file to calculate BSA.  There were no vitals taken for this visit.     Review of Systems:   Review of Systems:    Positive per HPI, otherwise negative.     Physical Exam:   Exam deferred due to telephone call    Performance Status:  Symptomatic; fully ambulatory    Labs/Imaging/Pathology: personally reviewed reports and images in Epic electronic medical record system. Pertinent results as it related to the plan represented in below in assessment and plan.      Assessment/Plan    1. Follicular lymphoma grade 1-2 in the right submandibular gland, staging pending     - Patient initially presented to Dr. Douglass from dental after finding a new mass in her right neck.   - We discussed excision biopsy consistent with classic follicular lymphoma grade 1-2.   - We discussed we will plan for a CT C/A/P to complete staging.  - If there is more lymphadenopathy than expected we could consider a PET CT to ensure there is not another area that is more FDG avid.  - Recent CBC and CMP unremarkable.   - I do not suspect bone marrow involvement.   - We will plan for a phone visit on 1/25/24 end of day.   - I will discuss at Tumor Board if there is any role for any radiation therapy.   - RTC for phone visit on 1/25/24 with scans on 1/22/24.     1/25/24: Telephone  Nutrition Assessment (Disaster Mode)    Type and Reason for Visit: Initial,Positive nutrition screen    Nutrition Recommendations/Plan:   - Continue regular diet. Malnutrition Assessment:  Malnutrition Status: No malnutrition    Nutrition Assessment:   Nutrition Assessment: Nutrition risk referral received, however screen updated and pt not at nutrition risk based on information provided by patent. Pt unavailable at time of visit. Pt just arrived on unit, unable to determine adequacy of meal intake. Noted patient agitated at time. Nutrition History and Allergies: PMH: seizure, polysubstance abuse, psychiatric disorder. Presented with SI.  Weight hx appears stable per chart hx. Intake PTA unknown. NKFA. Cultural/Zoroastrian/Ethnic Food Preference(s): None     Total Energy Requirements (kcals/day): [id number] Energy Requirements Based On: Fredda Petit (1.2-1.3) Weight Used for Energy Requirements: Current  Total Protein Requirements (g/day): 59-73 Weight Used for Protein Requirements: Current (0.8-1)  Total Fluid Requirements (ml/day): 4067-8194 Method Used for Fluid Requirements: 1 ml/kcal    Current Nutrition Therapies:  ADULT DIET Regular     Anthropometric Measures:  Height: 5' 9\" (175.3 cm) Weight: 73.9 kg (163 lb) Body mass index is 24.07 kg/m². Admission Body Weight: 162 lb 14.7 oz  Ideal Body Weight (lbs) (Calculated): 145 lbs Ideal Body Weight (Kg) (Calculated): 66 kg % IBW (Calculated): 112.4 %  Usual Body Weight: 73.9 kg (163 lb) % Weight Change (Calculated): 0            Nutrition Diagnosis:   No nutrition diagnosis at this time      Nutrition Interventions:   Food and/or Nutrient Delivery: Continue current diet  Nutrition Education/Counseling: Education not indicated  Coordination of Nutrition Care: Continue to monitor while inpatient       Goals: PO nutrition intake will meet >75% of patient estimated nutritional needs by next follow up date.     Nutrition Monitoring and Evaluation: Patient will be monitored per nutrition standards of care. Consult Dietitian if nutrition intervention essential to patient care is needed.    Behavioral-Environmental Outcomes: None identified  Food/Nutrient Intake Outcomes: Food and nutrient intake  Physical Signs/Symptoms Outcomes: Meal time behavior    Discharge Planning: No discharge needs at this time    Tosha Nagel RD on 1/25/2022 at 11:42 AM  Contact: [phone]    Disaster Mode Active call    - given some LAD below the neck on recent CT 1/22/23  - discussed at TB this AM 1/25/24 and recommended PET/CT  - PET/CT in 2weeks    RTC . This note has been transcribed using a medical scribe and there is a possibility of unintentional typing misprints.     Diagnoses and all orders for this visit:  Grade 1 follicular lymphoma of lymph nodes of multiple regions (CMS/HCC)  -     NM PET CT bone skull base to mid thigh; Future    Uma Stevenson MD  Hematology/Oncology  [facility]    Scribe Attestation  By signing my name below, IMira Scribe attest that this documentation has been prepared under the direction and in the presence of Uma Stevenson MD.

## 2024-01-25 ENCOUNTER — OFFICE VISIT (OUTPATIENT)
Dept: HEMATOLOGY/ONCOLOGY | Facility: CLINIC | Age: 69
End: 2024-01-25
Payer: MEDICARE

## 2024-01-25 ENCOUNTER — TUMOR BOARD CONFERENCE (OUTPATIENT)
Dept: HEMATOLOGY/ONCOLOGY | Facility: HOSPITAL | Age: 69
End: 2024-01-25
Payer: MEDICARE

## 2024-01-25 DIAGNOSIS — C82.08 GRADE 1 FOLLICULAR LYMPHOMA OF LYMPH NODES OF MULTIPLE REGIONS (MULTI): Primary | ICD-10-CM

## 2024-01-25 PROCEDURE — 1160F RVW MEDS BY RX/DR IN RCRD: CPT | Performed by: INTERNAL MEDICINE

## 2024-01-25 PROCEDURE — 1159F MED LIST DOCD IN RCRD: CPT | Performed by: INTERNAL MEDICINE

## 2024-01-25 PROCEDURE — 1157F ADVNC CARE PLAN IN RCRD: CPT | Performed by: INTERNAL MEDICINE

## 2024-01-25 PROCEDURE — 1126F AMNT PAIN NOTED NONE PRSNT: CPT | Performed by: INTERNAL MEDICINE

## 2024-01-25 PROCEDURE — 99213 OFFICE O/P EST LOW 20 MIN: CPT | Mod: G0 | Performed by: INTERNAL MEDICINE

## 2024-01-25 PROCEDURE — 99213 OFFICE O/P EST LOW 20 MIN: CPT | Performed by: INTERNAL MEDICINE

## 2024-02-04 NOTE — PROGRESS NOTES
Consent:  Verbal consent was requested and obtained from patient on this date for a telehealth visit.    Patient ID: Yoana Herzog is a 68 y.o. female.    Subjective    HPI  A telephone visit (audio only) between the patient at home and the provider at [facility] was utilized to provide this   telehealth service.     Ms. Yoana Herzog is a 69 y/o F presenting for follow-up for follicular lymphoma.     Pt does report some fatigue. No extensive night sweats. No new fevers, chills. Does report some increased urinary infections.              Patient's past medical history, surgical history, family history and social history reviewed.     Objective    BSA: There is no height or weight on file to calculate BSA.  There were no vitals taken for this visit.     Review of Systems:   Review of Systems:    Positive per HPI, otherwise negative.     Physical Exam:   Exam deferred due to telephone call    Performance Status:  Symptomatic; fully ambulatory    Labs/Imaging/Pathology: personally reviewed reports and images in Epic electronic medical record system. Pertinent results as it related to the plan represented in below in assessment and plan.      Assessment/Plan    1. Follicular lymphoma grade 1-2 in the right submandibular gland, staging pending     - Patient initially presented to Dr. Douglass from dental after finding a new mass in her right neck.   - We discussed excision biopsy consistent with classic follicular lymphoma grade 1-2.   - We discussed we will plan for a CT C/A/P to complete staging.  - If there is more lymphadenopathy than expected we could consider a PET CT to ensure there is not another area that is more FDG avid.  - Recent CBC and CMP unremarkable.   - I do not suspect bone marrow involvement.   - We will plan for a phone visit on 1/25/24 end of day.   - I will discuss at Tumor Board if there is any role for any radiation therapy.   - RTC for phone visit on 1/25/24 with scans  on 1/22/24.      1/25/24: Telephone call     - given some LAD below the neck on recent CT 1/22/23  - discussed at TB this AM 1/25/24 and recommended PET/CT  - PET/CT in 2 weeks    2/12/24: Telephone call   - discussed office visit to consider treatment given some increased infections rather than watch and wait approach  - would consider weekly rituximab x4       RTC in 1-2 weeks to discuss treatment options. This note has been transcribed using a medical scribe and there is a possibility of unintentional typing misprints.      Diagnoses and all orders for this visit:  Grade 1 follicular lymphoma of lymph nodes of multiple regions (CMS/HCC)  -     Clinic Appointment Request; Future  -     Oncology Line Draw Appointment Request; Future  -     CBC and Auto Differential; Future  -     Comprehensive metabolic panel; Future  -     Lactate dehydrogenase; Future  -     CBC and Auto Differential; Future  -     Comprehensive Metabolic Panel; Future  -     Hepatitis B Core Antibody, Total; Future  -     Hepatitis B surface antibody; Future  -     Hepatitis B surface antigen; Future  -     Lactate dehydrogenase; Future  -     Uric acid; Future    Uma Stevenson MD  Hematology/Oncology  [facility]    Scribe Attestation  By signing my name below, Elder MOORE Scribe attest that this documentation has been prepared under the direction and in the presence of Uma Stevenson MD.

## 2024-02-06 ENCOUNTER — ALLIED HEALTH (OUTPATIENT)
Dept: INTEGRATIVE MEDICINE | Facility: CLINIC | Age: 69
End: 2024-02-06
Payer: MEDICARE

## 2024-02-06 DIAGNOSIS — M99.02 SEGMENTAL AND SOMATIC DYSFUNCTION OF THORACIC REGION: ICD-10-CM

## 2024-02-06 DIAGNOSIS — M47.812 CERVICAL SPONDYLOSIS WITHOUT MYELOPATHY: ICD-10-CM

## 2024-02-06 DIAGNOSIS — M99.03 SEGMENTAL AND SOMATIC DYSFUNCTION OF LUMBAR REGION: ICD-10-CM

## 2024-02-06 DIAGNOSIS — M99.04 SEGMENTAL AND SOMATIC DYSFUNCTION OF SACRAL REGION: ICD-10-CM

## 2024-02-06 DIAGNOSIS — M54.2 NECK PAIN: ICD-10-CM

## 2024-02-06 DIAGNOSIS — M99.00 SEGMENTAL AND SOMATIC DYSFUNCTION OF HEAD REGION: Primary | ICD-10-CM

## 2024-02-06 DIAGNOSIS — M99.01 SEGMENTAL AND SOMATIC DYSFUNCTION OF CERVICAL REGION: ICD-10-CM

## 2024-02-06 DIAGNOSIS — M79.9 POSTURAL STRAIN: ICD-10-CM

## 2024-02-06 DIAGNOSIS — M47.816 LUMBAR SPONDYLOSIS: ICD-10-CM

## 2024-02-06 DIAGNOSIS — M53.3 SACRAL BACK PAIN: ICD-10-CM

## 2024-02-06 DIAGNOSIS — M99.05 SEGMENTAL AND SOMATIC DYSFUNCTION OF PELVIC REGION: ICD-10-CM

## 2024-02-06 PROCEDURE — 98942 CHIROPRACTIC MANJ 5 REGIONS: CPT | Performed by: CHIROPRACTOR

## 2024-02-06 ASSESSMENT — ENCOUNTER SYMPTOMS
AGITATION: 0
ABDOMINAL PAIN: 0
DIFFICULTY URINATING: 0
FACIAL ASYMMETRY: 0
FEVER: 0
CHILLS: 0
ALLERGIC/IMMUNOLOGIC COMMENTS: +SEASONAL ALLERGIES
SHORTNESS OF BREATH: 0
NAUSEA: 0
CONFUSION: 0

## 2024-02-06 NOTE — PROGRESS NOTES
Subjective   Patient ID: Yoana Herzog is a 68 y.o. female who presents today for the treatment of pain involving their left sided neck and right sided hip/sacrum pain.    This is visit 1 of the 2024 calendar year. Medicare.    Fall risk: None. No falls in the last 6 months.     HPI -since her last treatment encounter she did go to [place] with her family for a trip.  She did well but did have some aching pain in the lower back after all of the walking.  The neck held up better than she had expected.  We will focus treatment on the lower back hips and sacrum today but check neck and upper back as well as this is typically an area of tightness and stress for her.  Overall she is doing well no acute exacerbations.    Patient describes the pain as achiness and tingling, and rates the current pain 4 out of 10 (10 being the worst).     Last treatment visit 1/10/24: she presents today with the same chief complaint of left greater than right trapezius and neck pain, and right greater than left sacral/lower back pains.  Nothing is acutely exacerbated, however she is anticipating a trip to [place] with her family in about a week and a half.  She wants to make sure she can move and hitting with her children and grandchildren for this trip.  No new symptoms.    Additional medical information: R lymph node removed 12/20/23, Dx with follicular lymphoma. Had right submandibular mass that was found by her dentist in September 2023. Following with Oncology/Hematology Dr. Stevenson.      11/28/23: no acute pain issues today, however the left neck has been bothering her more and the right sacrum has been a little more stiff than usual.  She has been participating in the cooking for [employer] and has recently finished decorating her home for [employer].  She also mentions that she is recovering from a head cold which has contributed to some lack of energy and physical movement over the last week.    10/23/23: Yoana presents today with  the same complaints of left sided neck and right side hip/sacrum pain. She has been doing well since our last encounter as she would like to focus treatment on the same regions as it has been beneficial for her. 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 No additional injuries or changes in her medical history.    9/26/23:  She would like to continue to focus treatment on the left greater than right upper trapezius and neck regions. 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 Therefore she has been doing a lot of bending lifting and reaching. She has not been doing this consecutive days so she has days in between for recovery and rest. She also continues to have soreness and tightness in the right sacrum which is another chronic area that has regular exacerbations and flareups.      INITIAL INTAKE/SUBJECTIVE 12/12/22: She has seen chiropractic in the past. Her last adjustment was about 5 weeks ago. Is not that she is dissatisfied with the care she has been given, but she is hoping that our treatments will help her completely resolve the issue to the point where she does not need regular care.     Today her primary complaint is left neck and shoulder pain. 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 She also enjoys sewing and notices this pain does get worse after she has been doing a lot of sewing. She will occasionally feel tingling into the left hand most notably in the #3 through 5 fingers. She does have some headaches but these are localized to the suboccipital region and do not radiate into the forehead eyes or temples. In the past her physical therapist and chiropractors have done manual stretching of the left shoulder and done adjustments which have helped.     Her secondary pain complaints are in the right hip glued and sacral pains. She does  sleep with a pillow between the knees. She is unsure why but the last 6 to 12 months this has been getting progressively worse. She will occasionally have pain in the hamstring region but this is rare. She will occasionally feel sharp intense pain in the hip or the lower back but this only typically lasts a few seconds. Generally is more of a dull aching pain. The point of max tenderness is localized to the middle of the right butt cheek. No one has worked on this or massage it in the past.    Review of Systems   Constitutional:  Negative for chills and fever.   HENT:  Negative for congestion and sneezing.    Eyes:  Negative for visual disturbance.   Respiratory:  Negative for shortness of breath.    Cardiovascular:  Negative for chest pain.   Gastrointestinal:  Negative for abdominal pain and nausea.   Endocrine: Negative for polyuria.        R lymph node removed 12/20/23, Dx with follicular lymphoma   Genitourinary:  Negative for difficulty urinating.        2004 Hysterectomy   Musculoskeletal:         +Osteopenia, BL knee surgeries 1960/1970.   Skin:         +Rosacea    Allergic/Immunologic: Positive for environmental allergies.        +seasonal allergies   Neurological:  Negative for facial asymmetry.   Psychiatric/Behavioral:  Negative for agitation and confusion.      Objective   Observation : normal gait and forward head posture    Physical Exam  Examination findings (palpation & ROM): Tenderness palpated over the UT and levator scapulae on the left. Hypertonicity palpated over the lumbar paraspinals, right>left piriformis, right>left SIJ, right> left gluteal regions.    Segmental joint dysfunction was identified in the following areas using motion palpation and/or pain provocation assessment:  Cervical: C0-C3 (Supine)   Thoracic: T4-T9 (Prone)  Lumbar: L3-L5 (Scoop)  Sacrum: L5/S1 and right SI (Drop)    Technique Used: diversified CMT, pelvic drop table technique and manual traction.      Today's  treatment:  Performed spinal manipulation to the regions of segmental dysfunction identified on examination using age-appropriate and injury specific force, and manual diversified technique.     Brief Seated IASTM and supine MFR to the L>R neck, upper trapezius and L shoulder region. Prone MFR, IC to the lumbar paraspinals, upper gluteals, SIJ, piriformis.         Treatment Plan:   The patient and I discussed the risks and benefits of chiropractic care. Based on the patient's subjective complaints along with the examination findings, it is advised that a course of chiropractic treatment by initiated. Consent for care was given both written and orally by the patient. The patient tolerated today's treatment with little or no additional discomfort and was instructed to contact the office for questions or concerns.     Treatment Frequency: Will see/treat patient every 2-4 weeks as therapeutic benefit is sustained, then frequency will be reduced to as needed for symptom management or as needed for acute flare-ups/exacerbation.     Please note: Voice-to-text software was used when completing this note.  While the note was proofread, portions may include grammatical errors.  Please contact me with any questions/concerns as it relates to these types of errors.

## 2024-02-08 ENCOUNTER — HOSPITAL ENCOUNTER (OUTPATIENT)
Dept: RADIOLOGY | Facility: CLINIC | Age: 69
Discharge: HOME | End: 2024-02-08
Payer: MEDICARE

## 2024-02-08 DIAGNOSIS — C82.08 GRADE 1 FOLLICULAR LYMPHOMA OF LYMPH NODES OF MULTIPLE REGIONS (MULTI): ICD-10-CM

## 2024-02-08 PROCEDURE — 78815 PET IMAGE W/CT SKULL-THIGH: CPT | Mod: PI

## 2024-02-08 PROCEDURE — 78815 PET IMAGE W/CT SKULL-THIGH: CPT | Mod: PET TUMOR INIT TX STRAT | Performed by: NUCLEAR MEDICINE

## 2024-02-08 PROCEDURE — A9552 F18 FDG: HCPCS | Performed by: INTERNAL MEDICINE

## 2024-02-08 PROCEDURE — 3430000001 HC RX 343 DIAGNOSTIC RADIOPHARMACEUTICALS: Performed by: INTERNAL MEDICINE

## 2024-02-08 RX ORDER — FLUDEOXYGLUCOSE F 18 200 MCI/ML
12.9 INJECTION, SOLUTION INTRAVENOUS
Status: COMPLETED | OUTPATIENT
Start: 2024-02-08 | End: 2024-02-08

## 2024-02-08 RX ADMIN — FLUDEOXYGLUCOSE F 18 12.9 MILLICURIE: 200 INJECTION, SOLUTION INTRAVENOUS at 08:06

## 2024-02-12 ENCOUNTER — TELEMEDICINE (OUTPATIENT)
Dept: HEMATOLOGY/ONCOLOGY | Facility: CLINIC | Age: 69
End: 2024-02-12
Payer: MEDICARE

## 2024-02-12 DIAGNOSIS — C82.08 GRADE 1 FOLLICULAR LYMPHOMA OF LYMPH NODES OF MULTIPLE REGIONS (MULTI): Primary | ICD-10-CM

## 2024-02-12 PROCEDURE — 1159F MED LIST DOCD IN RCRD: CPT | Performed by: INTERNAL MEDICINE

## 2024-02-12 PROCEDURE — 99213 OFFICE O/P EST LOW 20 MIN: CPT | Performed by: INTERNAL MEDICINE

## 2024-02-12 PROCEDURE — 99213 OFFICE O/P EST LOW 20 MIN: CPT | Mod: G0 | Performed by: INTERNAL MEDICINE

## 2024-02-12 PROCEDURE — 1160F RVW MEDS BY RX/DR IN RCRD: CPT | Performed by: INTERNAL MEDICINE

## 2024-02-12 PROCEDURE — 1126F AMNT PAIN NOTED NONE PRSNT: CPT | Performed by: INTERNAL MEDICINE

## 2024-02-12 PROCEDURE — 1157F ADVNC CARE PLAN IN RCRD: CPT | Performed by: INTERNAL MEDICINE

## 2024-02-21 ENCOUNTER — LAB (OUTPATIENT)
Dept: LAB | Facility: CLINIC | Age: 69
End: 2024-02-21
Payer: MEDICARE

## 2024-02-21 DIAGNOSIS — C82.08 GRADE 1 FOLLICULAR LYMPHOMA OF LYMPH NODES OF MULTIPLE REGIONS (MULTI): ICD-10-CM

## 2024-02-21 LAB
ALBUMIN SERPL BCP-MCNC: 4.4 G/DL (ref 3.4–5)
ALP SERPL-CCNC: 64 U/L (ref 33–136)
ALT SERPL W P-5'-P-CCNC: 12 U/L (ref 7–45)
ANION GAP SERPL CALC-SCNC: 12 MMOL/L (ref 10–20)
AST SERPL W P-5'-P-CCNC: 16 U/L (ref 9–39)
BASOPHILS # BLD AUTO: 0.05 X10*3/UL (ref 0–0.1)
BASOPHILS NFR BLD AUTO: 0.9 %
BILIRUB SERPL-MCNC: 0.6 MG/DL (ref 0–1.2)
BUN SERPL-MCNC: 16 MG/DL (ref 6–23)
CALCIUM SERPL-MCNC: 9.4 MG/DL (ref 8.6–10.3)
CHLORIDE SERPL-SCNC: 105 MMOL/L (ref 98–107)
CO2 SERPL-SCNC: 29 MMOL/L (ref 21–32)
CREAT SERPL-MCNC: 0.75 MG/DL (ref 0.5–1.05)
EGFRCR SERPLBLD CKD-EPI 2021: 86 ML/MIN/1.73M*2
EOSINOPHIL # BLD AUTO: 0.11 X10*3/UL (ref 0–0.7)
EOSINOPHIL NFR BLD AUTO: 1.9 %
ERYTHROCYTE [DISTWIDTH] IN BLOOD BY AUTOMATED COUNT: 13.6 % (ref 11.5–14.5)
GLUCOSE SERPL-MCNC: 84 MG/DL (ref 74–99)
HBV CORE AB SER QL: NONREACTIVE
HBV SURFACE AB SER-ACNC: <3.1 MIU/ML
HBV SURFACE AG SERPL QL IA: NONREACTIVE
HCT VFR BLD AUTO: 43.8 % (ref 36–46)
HGB BLD-MCNC: 13.6 G/DL (ref 12–16)
IMM GRANULOCYTES # BLD AUTO: 0 X10*3/UL (ref 0–0.7)
IMM GRANULOCYTES NFR BLD AUTO: 0 % (ref 0–0.9)
LDH SERPL L TO P-CCNC: 136 U/L (ref 84–246)
LYMPHOCYTES # BLD AUTO: 1.48 X10*3/UL (ref 1.2–4.8)
LYMPHOCYTES NFR BLD AUTO: 25.5 %
MCH RBC QN AUTO: 26.9 PG (ref 26–34)
MCHC RBC AUTO-ENTMCNC: 31.1 G/DL (ref 32–36)
MCV RBC AUTO: 87 FL (ref 80–100)
MONOCYTES # BLD AUTO: 0.45 X10*3/UL (ref 0.1–1)
MONOCYTES NFR BLD AUTO: 7.8 %
NEUTROPHILS # BLD AUTO: 3.71 X10*3/UL (ref 1.2–7.7)
NEUTROPHILS NFR BLD AUTO: 63.9 %
PLATELET # BLD AUTO: 224 X10*3/UL (ref 150–450)
POTASSIUM SERPL-SCNC: 4.1 MMOL/L (ref 3.5–5.3)
PROT SERPL-MCNC: 6.7 G/DL (ref 6.4–8.2)
RBC # BLD AUTO: 5.05 X10*6/UL (ref 4–5.2)
SODIUM SERPL-SCNC: 142 MMOL/L (ref 136–145)
URATE SERPL-MCNC: 4.6 MG/DL (ref 2.3–6.7)
WBC # BLD AUTO: 5.8 X10*3/UL (ref 4.4–11.3)

## 2024-02-21 PROCEDURE — 36415 COLL VENOUS BLD VENIPUNCTURE: CPT

## 2024-02-21 PROCEDURE — 86704 HEP B CORE ANTIBODY TOTAL: CPT

## 2024-02-21 PROCEDURE — 85025 COMPLETE CBC W/AUTO DIFF WBC: CPT

## 2024-02-21 PROCEDURE — 80053 COMPREHEN METABOLIC PANEL: CPT

## 2024-02-21 PROCEDURE — 87340 HEPATITIS B SURFACE AG IA: CPT

## 2024-02-21 PROCEDURE — 84550 ASSAY OF BLOOD/URIC ACID: CPT

## 2024-02-21 PROCEDURE — 83615 LACTATE (LD) (LDH) ENZYME: CPT

## 2024-02-21 PROCEDURE — 86706 HEP B SURFACE ANTIBODY: CPT

## 2024-02-21 NOTE — PROGRESS NOTES
Patient ID: Yoana Herzog is a 68 y.o. female.    Subjective    HPI  Ms. Yoana Herzog is a 69 y/o F presenting for follow-up for follicular lymphoma.      Since last visit, patient had a PET CT on 2/8/2024, which shows hypermetabolic activity within bilateral subcentimeter axillary lymph nodes, a left internal mammary lymph node, and bilateral inguinal lymph nodes. Scattered hypermetabolic activity throughout the axial and appendicular skeleton corresponding to lytic lesions seen on CT compatible with FDG avid osseous disease.     Patient reports that she has been having some new bone pain. Denies any other new pain. Denies any new chest pain, cough or shortness of breath. No new lumps or bumps or rashes. No other complaints today.     Patient's past medical history, surgical history, family history and social history reviewed.    Objective    Vitals:    02/22/24 1505   BP: 144/76   Pulse: 65   Resp: 16   Temp: 36 °C (96.8 °F)   SpO2: 98%       Review of Systems:   Review of Systems:    Positive per HPI, otherwise negative.     Physical Exam:   Constitutional: Patient appears in no acute distress.   Sitting comfortably in chair.  Eyes: EOMI, clear sclera  ENMT: mucous membranes moist, no apparent injury  Head/Neck: Neck supple, no JVD  Respiratory/Thorax: Patent airways, CTAB, normal  breath sounds, no increased work of breathing  Cardiovascular: Regular, rate and rhythm, no murmurs  Gastrointestinal: Nondistended, soft, non-tender,  no rebound tenderness or guarding, no masses palpable  Extremities: normal extremities, no cyanosis edema,  no swelling  Neurological: alert and oriented x3, nonfocal, normal  speech and hearing  Lymphatic: No palpable lymphadenopathy in cervical,  axillary  lymph nodes.  Spleen appears normal size.  Psychological: Appropriate mood and behavior, normal  affect  Skin: Warm and dry, no lesions, no rashes      Performance Status:  Symptomatic; fully  ambulatory    Labs/Imaging/Pathology: personally reviewed reports and images in Epic electronic medical record system. Pertinent results as it related to the plan represented in below in assessment and plan.      Assessment/Plan   1. Follicular lymphoma grade 1-2 in the right submandibular gland, staging pending     - Patient initially presented to Dr. Douglass from dental after finding a new mass in her right neck.   - We discussed excision biopsy consistent with classic follicular lymphoma grade 1-2.   - We discussed we will plan for a CT C/A/P to complete staging.  - If there is more lymphadenopathy than expected we could consider a PET CT to ensure there is not another area that is more FDG avid.  - Recent CBC and CMP unremarkable.   - I do not suspect bone marrow involvement.   - We will plan for a phone visit on 1/25/24 end of day.   - I will discuss at Tumor Board if there is any role for any radiation therapy.   - RTC for phone visit on 1/25/24 with scans on 1/22/24.      1/25/24: Telephone call     - given some LAD below the neck on recent CT 1/22/23  - discussed at  this AM 1/25/24 and recommended PET/CT  - PET/CT in 2 weeks     2/12/24: Telephone call   - discussed office visit to consider treatment given some increased infections rather than watch and wait approach  - would consider weekly rituximab x4      2/22/24:  - Overall, we discussed given she does have bone involvement I do recommend treatment.   - We discussed she would be considered stage 4 with her lymphoma and she is having symptoms with bone pain.  - We discussed given she has been minimally asymptomatic we could start with weekly rituximab rather than bendamustine/rituximab as she is very nervous about side effects from traditional chemo. We reviewed side effects and consent signed. We will plan to start Tuesday.  - Labs completed yesterday. Hepatitis B core antibody nonreactive.    - We will plan for a PET CT 7 weeks after her last cycle  and follow-up with me 8 weeks post treatment for consideration of maintenance therapy and ensuring response versus addition of chemotherapy.  - RTC with me 8 weeks post.    RTC with me 8 weeks post. This note has been transcribed using a medical scribe and there is a possibility of unintentional typing misprints.     Diagnoses and all orders for this visit:  Grade 1 follicular lymphoma of lymph nodes of multiple regions (CMS/HCC)  -     Infusion Appointment Request; Future  -     CBC and Auto Differential; Future  -     Comprehensive metabolic panel; Future  -     Lactate dehydrogenase; Future  -     Uric Acid; Future  -     Infusion Appointment Request; Future  -     CBC and Auto Differential; Future  -     Comprehensive metabolic panel; Future  -     Lactate dehydrogenase; Future  -     Uric Acid; Future  -     Infusion Appointment Request; Future  -     CBC and Auto Differential; Future  -     Comprehensive metabolic panel; Future  -     Lactate dehydrogenase; Future  -     Uric Acid; Future  -     Infusion Appointment Request; Future  -     CBC and Auto Differential; Future  -     Comprehensive metabolic panel; Future  -     Lactate dehydrogenase; Future  -     Uric Acid; Future  Other orders  -     ondansetron (Zofran) injection 4 mg  -     diphenhydrAMINE (BENADryl) injection 25 mg  -     dexAMETHasone (Decadron) injection 16 mg  -     famotidine PF (Pepcid) injection 20 mg  -     acetaminophen (Tylenol) tablet 650 mg  -     prochlorperazine (Compazine) tablet 10 mg  -     prochlorperazine (Compazine) injection 10 mg  -     riTUXimab-pvvr (Ruxience) 660 mg in sodium chloride 0.9% 316 mL IV  -     sodium chloride 0.9 % bolus 500 mL  -     dextrose 5 % in water (D5W) bolus  -     diphenhydrAMINE (BENADryl) injection 50 mg  -     methylPREDNISolone sod succinate (PF) (SOLU-Medrol) 40 mg/mL injection 40 mg  -     famotidine PF (Pepcid) injection 20 mg  -     EPINEPHrine (Epipen) injection syringe 0.3 mg  -      albuterol 2.5 mg /3 mL (0.083 %) nebulizer solution 3 mL  -     ondansetron (Zofran) injection 4 mg  -     diphenhydrAMINE (BENADryl) injection 25 mg  -     dexAMETHasone (Decadron) injection 16 mg  -     famotidine PF (Pepcid) injection 20 mg  -     acetaminophen (Tylenol) tablet 650 mg  -     prochlorperazine (Compazine) tablet 10 mg  -     prochlorperazine (Compazine) injection 10 mg  -     riTUXimab-pvvr (Ruxience) 660 mg in sodium chloride 0.9% 316 mL IV  -     sodium chloride 0.9 % bolus 500 mL  -     dextrose 5 % in water (D5W) bolus  -     diphenhydrAMINE (BENADryl) injection 50 mg  -     methylPREDNISolone sod succinate (PF) (SOLU-Medrol) 40 mg/mL injection 40 mg  -     famotidine PF (Pepcid) injection 20 mg  -     EPINEPHrine (Epipen) injection syringe 0.3 mg  -     albuterol 2.5 mg /3 mL (0.083 %) nebulizer solution 3 mL  -     ondansetron (Zofran) injection 4 mg  -     diphenhydrAMINE (BENADryl) injection 25 mg  -     dexAMETHasone (Decadron) injection 16 mg  -     famotidine PF (Pepcid) injection 20 mg  -     acetaminophen (Tylenol) tablet 650 mg  -     prochlorperazine (Compazine) tablet 10 mg  -     prochlorperazine (Compazine) injection 10 mg  -     riTUXimab-pvvr (Ruxience) 660 mg in sodium chloride 0.9% 316 mL IV  -     sodium chloride 0.9 % bolus 500 mL  -     dextrose 5 % in water (D5W) bolus  -     diphenhydrAMINE (BENADryl) injection 50 mg  -     methylPREDNISolone sod succinate (PF) (SOLU-Medrol) 40 mg/mL injection 40 mg  -     famotidine PF (Pepcid) injection 20 mg  -     EPINEPHrine (Epipen) injection syringe 0.3 mg  -     albuterol 2.5 mg /3 mL (0.083 %) nebulizer solution 3 mL  -     ondansetron (Zofran) injection 4 mg  -     diphenhydrAMINE (BENADryl) injection 25 mg  -     dexAMETHasone (Decadron) injection 16 mg  -     famotidine PF (Pepcid) injection 20 mg  -     acetaminophen (Tylenol) tablet 650 mg  -     prochlorperazine (Compazine) tablet 10 mg  -     prochlorperazine (Compazine)  injection 10 mg  -     riTUXimab-pvvr (Ruxience) 660 mg in sodium chloride 0.9% 316 mL IV  -     sodium chloride 0.9 % bolus 500 mL  -     dextrose 5 % in water (D5W) bolus  -     diphenhydrAMINE (BENADryl) injection 50 mg  -     methylPREDNISolone sod succinate (PF) (SOLU-Medrol) 40 mg/mL injection 40 mg  -     famotidine PF (Pepcid) injection 20 mg  -     EPINEPHrine (Epipen) injection syringe 0.3 mg  -     albuterol 2.5 mg /3 mL (0.083 %) nebulizer solution 3 mL    Uma Stevenson MD  Hematology/Oncology  [facility]    Scribe Attestation  By signing my name below, IMira Scribe attest that this documentation has been prepared under the direction and in the presence of Uma Stevenson MD.

## 2024-02-22 ENCOUNTER — OFFICE VISIT (OUTPATIENT)
Dept: HEMATOLOGY/ONCOLOGY | Facility: CLINIC | Age: 69
End: 2024-02-22
Payer: MEDICARE

## 2024-02-22 VITALS
HEART RATE: 65 BPM | BODY MASS INDEX: 27.26 KG/M2 | TEMPERATURE: 96.8 F | OXYGEN SATURATION: 98 % | DIASTOLIC BLOOD PRESSURE: 76 MMHG | WEIGHT: 153.88 LBS | RESPIRATION RATE: 16 BRPM | SYSTOLIC BLOOD PRESSURE: 144 MMHG

## 2024-02-22 DIAGNOSIS — C82.08 GRADE 1 FOLLICULAR LYMPHOMA OF LYMPH NODES OF MULTIPLE REGIONS (MULTI): Primary | ICD-10-CM

## 2024-02-22 PROCEDURE — 1157F ADVNC CARE PLAN IN RCRD: CPT | Performed by: INTERNAL MEDICINE

## 2024-02-22 PROCEDURE — 99215 OFFICE O/P EST HI 40 MIN: CPT | Performed by: INTERNAL MEDICINE

## 2024-02-22 PROCEDURE — 1159F MED LIST DOCD IN RCRD: CPT | Performed by: INTERNAL MEDICINE

## 2024-02-22 PROCEDURE — 1125F AMNT PAIN NOTED PAIN PRSNT: CPT | Performed by: INTERNAL MEDICINE

## 2024-02-22 PROCEDURE — 1160F RVW MEDS BY RX/DR IN RCRD: CPT | Performed by: INTERNAL MEDICINE

## 2024-02-22 RX ORDER — FAMOTIDINE 10 MG/ML
20 INJECTION INTRAVENOUS ONCE AS NEEDED
Status: CANCELLED | OUTPATIENT
Start: 2024-03-13

## 2024-02-22 RX ORDER — ONDANSETRON HYDROCHLORIDE 2 MG/ML
4 INJECTION, SOLUTION INTRAVENOUS ONCE
Status: CANCELLED
Start: 2024-03-20 | End: 2024-03-19

## 2024-02-22 RX ORDER — EPINEPHRINE 0.3 MG/.3ML
0.3 INJECTION SUBCUTANEOUS EVERY 5 MIN PRN
Status: CANCELLED | OUTPATIENT
Start: 2024-02-28

## 2024-02-22 RX ORDER — ONDANSETRON HYDROCHLORIDE 2 MG/ML
4 INJECTION, SOLUTION INTRAVENOUS ONCE
Status: CANCELLED
Start: 2024-03-06 | End: 2024-03-05

## 2024-02-22 RX ORDER — FAMOTIDINE 10 MG/ML
20 INJECTION INTRAVENOUS ONCE AS NEEDED
Status: CANCELLED | OUTPATIENT
Start: 2024-03-20

## 2024-02-22 RX ORDER — ALBUTEROL SULFATE 0.83 MG/ML
3 SOLUTION RESPIRATORY (INHALATION) AS NEEDED
Status: CANCELLED | OUTPATIENT
Start: 2024-03-13

## 2024-02-22 RX ORDER — PROCHLORPERAZINE MALEATE 10 MG
10 TABLET ORAL EVERY 6 HOURS PRN
Status: CANCELLED | OUTPATIENT
Start: 2024-03-06

## 2024-02-22 RX ORDER — ACETAMINOPHEN 325 MG/1
650 TABLET ORAL ONCE
Status: CANCELLED | OUTPATIENT
Start: 2024-03-06

## 2024-02-22 RX ORDER — DIPHENHYDRAMINE HYDROCHLORIDE 50 MG/ML
50 INJECTION INTRAMUSCULAR; INTRAVENOUS AS NEEDED
Status: CANCELLED | OUTPATIENT
Start: 2024-02-28

## 2024-02-22 RX ORDER — DIPHENHYDRAMINE HYDROCHLORIDE 50 MG/ML
25 INJECTION INTRAMUSCULAR; INTRAVENOUS EVERY 6 HOURS PRN
Status: CANCELLED
Start: 2024-02-28

## 2024-02-22 RX ORDER — ONDANSETRON HYDROCHLORIDE 2 MG/ML
4 INJECTION, SOLUTION INTRAVENOUS ONCE
Status: CANCELLED
Start: 2024-02-28 | End: 2024-02-27

## 2024-02-22 RX ORDER — ACETAMINOPHEN 325 MG/1
650 TABLET ORAL ONCE
Status: CANCELLED | OUTPATIENT
Start: 2024-02-28

## 2024-02-22 RX ORDER — DIPHENHYDRAMINE HYDROCHLORIDE 50 MG/ML
25 INJECTION INTRAMUSCULAR; INTRAVENOUS EVERY 6 HOURS PRN
Status: CANCELLED
Start: 2024-03-13

## 2024-02-22 RX ORDER — ONDANSETRON HYDROCHLORIDE 2 MG/ML
4 INJECTION, SOLUTION INTRAVENOUS ONCE
Status: CANCELLED
Start: 2024-03-13 | End: 2024-03-12

## 2024-02-22 RX ORDER — FAMOTIDINE 10 MG/ML
20 INJECTION INTRAVENOUS ONCE
Status: CANCELLED
Start: 2024-03-06

## 2024-02-22 RX ORDER — EPINEPHRINE 0.3 MG/.3ML
0.3 INJECTION SUBCUTANEOUS EVERY 5 MIN PRN
Status: CANCELLED | OUTPATIENT
Start: 2024-03-06

## 2024-02-22 RX ORDER — ALBUTEROL SULFATE 0.83 MG/ML
3 SOLUTION RESPIRATORY (INHALATION) AS NEEDED
Status: CANCELLED | OUTPATIENT
Start: 2024-03-20

## 2024-02-22 RX ORDER — DIPHENHYDRAMINE HYDROCHLORIDE 50 MG/ML
50 INJECTION INTRAMUSCULAR; INTRAVENOUS AS NEEDED
Status: CANCELLED | OUTPATIENT
Start: 2024-03-13

## 2024-02-22 RX ORDER — DIPHENHYDRAMINE HYDROCHLORIDE 50 MG/ML
25 INJECTION INTRAMUSCULAR; INTRAVENOUS EVERY 6 HOURS PRN
Status: CANCELLED
Start: 2024-03-06

## 2024-02-22 RX ORDER — DIPHENHYDRAMINE HYDROCHLORIDE 50 MG/ML
50 INJECTION INTRAMUSCULAR; INTRAVENOUS AS NEEDED
Status: CANCELLED | OUTPATIENT
Start: 2024-03-06

## 2024-02-22 RX ORDER — ACETAMINOPHEN 325 MG/1
650 TABLET ORAL ONCE
Status: CANCELLED | OUTPATIENT
Start: 2024-03-20

## 2024-02-22 RX ORDER — FAMOTIDINE 10 MG/ML
20 INJECTION INTRAVENOUS ONCE AS NEEDED
Status: CANCELLED | OUTPATIENT
Start: 2024-02-28

## 2024-02-22 RX ORDER — PROCHLORPERAZINE EDISYLATE 5 MG/ML
10 INJECTION INTRAMUSCULAR; INTRAVENOUS EVERY 6 HOURS PRN
Status: CANCELLED | OUTPATIENT
Start: 2024-03-20

## 2024-02-22 RX ORDER — DIPHENHYDRAMINE HYDROCHLORIDE 50 MG/ML
25 INJECTION INTRAMUSCULAR; INTRAVENOUS EVERY 6 HOURS PRN
Status: CANCELLED
Start: 2024-03-20

## 2024-02-22 RX ORDER — FAMOTIDINE 10 MG/ML
20 INJECTION INTRAVENOUS ONCE
Status: CANCELLED
Start: 2024-03-13

## 2024-02-22 RX ORDER — PROCHLORPERAZINE MALEATE 10 MG
10 TABLET ORAL EVERY 6 HOURS PRN
Status: CANCELLED | OUTPATIENT
Start: 2024-02-28

## 2024-02-22 RX ORDER — PROCHLORPERAZINE EDISYLATE 5 MG/ML
10 INJECTION INTRAMUSCULAR; INTRAVENOUS EVERY 6 HOURS PRN
Status: CANCELLED | OUTPATIENT
Start: 2024-03-13

## 2024-02-22 RX ORDER — ALBUTEROL SULFATE 0.83 MG/ML
3 SOLUTION RESPIRATORY (INHALATION) AS NEEDED
Status: CANCELLED | OUTPATIENT
Start: 2024-02-28

## 2024-02-22 RX ORDER — FAMOTIDINE 10 MG/ML
20 INJECTION INTRAVENOUS ONCE
Status: CANCELLED
Start: 2024-02-28

## 2024-02-22 RX ORDER — PROCHLORPERAZINE MALEATE 10 MG
10 TABLET ORAL EVERY 6 HOURS PRN
Status: CANCELLED | OUTPATIENT
Start: 2024-03-20

## 2024-02-22 RX ORDER — FAMOTIDINE 10 MG/ML
20 INJECTION INTRAVENOUS ONCE AS NEEDED
Status: CANCELLED | OUTPATIENT
Start: 2024-03-06

## 2024-02-22 RX ORDER — DIPHENHYDRAMINE HYDROCHLORIDE 50 MG/ML
50 INJECTION INTRAMUSCULAR; INTRAVENOUS AS NEEDED
Status: CANCELLED | OUTPATIENT
Start: 2024-03-20

## 2024-02-22 RX ORDER — PROCHLORPERAZINE MALEATE 10 MG
10 TABLET ORAL EVERY 6 HOURS PRN
Status: CANCELLED | OUTPATIENT
Start: 2024-03-13

## 2024-02-22 RX ORDER — EPINEPHRINE 0.3 MG/.3ML
0.3 INJECTION SUBCUTANEOUS EVERY 5 MIN PRN
Status: CANCELLED | OUTPATIENT
Start: 2024-03-20

## 2024-02-22 RX ORDER — FAMOTIDINE 10 MG/ML
20 INJECTION INTRAVENOUS ONCE
Status: CANCELLED
Start: 2024-03-20

## 2024-02-22 RX ORDER — PROCHLORPERAZINE EDISYLATE 5 MG/ML
10 INJECTION INTRAMUSCULAR; INTRAVENOUS EVERY 6 HOURS PRN
Status: CANCELLED | OUTPATIENT
Start: 2024-02-28

## 2024-02-22 RX ORDER — ACETAMINOPHEN 325 MG/1
650 TABLET ORAL ONCE
Status: CANCELLED | OUTPATIENT
Start: 2024-03-13

## 2024-02-22 RX ORDER — EPINEPHRINE 0.3 MG/.3ML
0.3 INJECTION SUBCUTANEOUS EVERY 5 MIN PRN
Status: CANCELLED | OUTPATIENT
Start: 2024-03-13

## 2024-02-22 RX ORDER — PROCHLORPERAZINE EDISYLATE 5 MG/ML
10 INJECTION INTRAMUSCULAR; INTRAVENOUS EVERY 6 HOURS PRN
Status: CANCELLED | OUTPATIENT
Start: 2024-03-06

## 2024-02-22 RX ORDER — ALBUTEROL SULFATE 0.83 MG/ML
3 SOLUTION RESPIRATORY (INHALATION) AS NEEDED
Status: CANCELLED | OUTPATIENT
Start: 2024-03-06

## 2024-02-22 ASSESSMENT — PAIN SCALES - GENERAL: PAINLEVEL: 4

## 2024-02-23 DIAGNOSIS — C82.08 GRADE 1 FOLLICULAR LYMPHOMA OF LYMPH NODES OF MULTIPLE REGIONS (MULTI): ICD-10-CM

## 2024-02-27 RX ORDER — HEPARIN SODIUM,PORCINE/PF 10 UNIT/ML
50 SYRINGE (ML) INTRAVENOUS AS NEEDED
Status: CANCELLED | OUTPATIENT
Start: 2024-02-28

## 2024-02-27 RX ORDER — HEPARIN 100 UNIT/ML
500 SYRINGE INTRAVENOUS AS NEEDED
Status: CANCELLED | OUTPATIENT
Start: 2024-02-28

## 2024-02-28 ENCOUNTER — NUTRITION (OUTPATIENT)
Dept: HEMATOLOGY/ONCOLOGY | Facility: CLINIC | Age: 69
End: 2024-02-28

## 2024-02-28 ENCOUNTER — HOSPITAL ENCOUNTER (EMERGENCY)
Facility: HOSPITAL | Age: 69
Discharge: HOME | End: 2024-02-29
Attending: STUDENT IN AN ORGANIZED HEALTH CARE EDUCATION/TRAINING PROGRAM
Payer: MEDICARE

## 2024-02-28 ENCOUNTER — INFUSION (OUTPATIENT)
Dept: HEMATOLOGY/ONCOLOGY | Facility: CLINIC | Age: 69
End: 2024-02-28
Payer: MEDICARE

## 2024-02-28 VITALS
SYSTOLIC BLOOD PRESSURE: 110 MMHG | HEART RATE: 62 BPM | RESPIRATION RATE: 16 BRPM | TEMPERATURE: 99.1 F | DIASTOLIC BLOOD PRESSURE: 62 MMHG | HEIGHT: 62 IN | OXYGEN SATURATION: 98 % | BODY MASS INDEX: 28.07 KG/M2 | WEIGHT: 152.56 LBS

## 2024-02-28 VITALS — BODY MASS INDEX: 28.07 KG/M2 | HEIGHT: 62 IN | WEIGHT: 152.56 LBS

## 2024-02-28 DIAGNOSIS — C82.08 GRADE 1 FOLLICULAR LYMPHOMA OF LYMPH NODES OF MULTIPLE REGIONS (MULTI): ICD-10-CM

## 2024-02-28 DIAGNOSIS — T78.40XA ALLERGIC REACTION, INITIAL ENCOUNTER: Primary | ICD-10-CM

## 2024-02-28 PROCEDURE — 99284 EMERGENCY DEPT VISIT MOD MDM: CPT | Performed by: STUDENT IN AN ORGANIZED HEALTH CARE EDUCATION/TRAINING PROGRAM

## 2024-02-28 PROCEDURE — 96374 THER/PROPH/DIAG INJ IV PUSH: CPT | Performed by: STUDENT IN AN ORGANIZED HEALTH CARE EDUCATION/TRAINING PROGRAM

## 2024-02-28 PROCEDURE — 96372 THER/PROPH/DIAG INJ SC/IM: CPT | Performed by: STUDENT IN AN ORGANIZED HEALTH CARE EDUCATION/TRAINING PROGRAM

## 2024-02-28 PROCEDURE — 96413 CHEMO IV INFUSION 1 HR: CPT

## 2024-02-28 PROCEDURE — 96375 TX/PRO/DX INJ NEW DRUG ADDON: CPT | Mod: INF

## 2024-02-28 PROCEDURE — 2500000004 HC RX 250 GENERAL PHARMACY W/ HCPCS (ALT 636 FOR OP/ED): Performed by: INTERNAL MEDICINE

## 2024-02-28 PROCEDURE — 96415 CHEMO IV INFUSION ADDL HR: CPT

## 2024-02-28 PROCEDURE — 96372 THER/PROPH/DIAG INJ SC/IM: CPT

## 2024-02-28 PROCEDURE — 2500000004 HC RX 250 GENERAL PHARMACY W/ HCPCS (ALT 636 FOR OP/ED): Performed by: STUDENT IN AN ORGANIZED HEALTH CARE EDUCATION/TRAINING PROGRAM

## 2024-02-28 PROCEDURE — 99284 EMERGENCY DEPT VISIT MOD MDM: CPT | Mod: 25 | Performed by: STUDENT IN AN ORGANIZED HEALTH CARE EDUCATION/TRAINING PROGRAM

## 2024-02-28 PROCEDURE — 96376 TX/PRO/DX INJ SAME DRUG ADON: CPT

## 2024-02-28 RX ORDER — FAMOTIDINE 10 MG/ML
20 INJECTION INTRAVENOUS ONCE
Status: COMPLETED | OUTPATIENT
Start: 2024-02-28 | End: 2024-02-28

## 2024-02-28 RX ORDER — ONDANSETRON HYDROCHLORIDE 2 MG/ML
4 INJECTION, SOLUTION INTRAVENOUS ONCE
Status: COMPLETED | OUTPATIENT
Start: 2024-02-28 | End: 2024-02-28

## 2024-02-28 RX ORDER — PREDNISONE 20 MG/1
40 TABLET ORAL ONCE
Status: COMPLETED | OUTPATIENT
Start: 2024-02-28 | End: 2024-02-28

## 2024-02-28 RX ORDER — PROCHLORPERAZINE EDISYLATE 5 MG/ML
10 INJECTION INTRAMUSCULAR; INTRAVENOUS EVERY 6 HOURS PRN
Status: DISCONTINUED | OUTPATIENT
Start: 2024-02-28 | End: 2024-02-28 | Stop reason: HOSPADM

## 2024-02-28 RX ORDER — EPINEPHRINE 1 MG/ML
0.3 INJECTION INTRAMUSCULAR; INTRAVENOUS; SUBCUTANEOUS ONCE
Status: COMPLETED | OUTPATIENT
Start: 2024-02-28 | End: 2024-02-28

## 2024-02-28 RX ORDER — ALBUTEROL SULFATE 0.83 MG/ML
3 SOLUTION RESPIRATORY (INHALATION) AS NEEDED
Status: DISCONTINUED | OUTPATIENT
Start: 2024-02-28 | End: 2024-02-28 | Stop reason: HOSPADM

## 2024-02-28 RX ORDER — DIPHENHYDRAMINE HYDROCHLORIDE 50 MG/ML
50 INJECTION INTRAMUSCULAR; INTRAVENOUS AS NEEDED
Status: COMPLETED | OUTPATIENT
Start: 2024-02-28 | End: 2024-02-28

## 2024-02-28 RX ORDER — EPINEPHRINE 0.3 MG/.3ML
0.3 INJECTION SUBCUTANEOUS EVERY 5 MIN PRN
Status: DISCONTINUED | OUTPATIENT
Start: 2024-02-28 | End: 2024-02-28 | Stop reason: HOSPADM

## 2024-02-28 RX ORDER — HEPARIN SODIUM,PORCINE/PF 10 UNIT/ML
50 SYRINGE (ML) INTRAVENOUS AS NEEDED
Status: DISCONTINUED | OUTPATIENT
Start: 2024-02-28 | End: 2024-02-28 | Stop reason: HOSPADM

## 2024-02-28 RX ORDER — LIDOCAINE HYDROCHLORIDE 40 MG/ML
4 INJECTION, SOLUTION RETROBULBAR; TOPICAL ONCE
Status: DISCONTINUED | OUTPATIENT
Start: 2024-02-28 | End: 2024-02-28

## 2024-02-28 RX ORDER — HEPARIN 100 UNIT/ML
500 SYRINGE INTRAVENOUS AS NEEDED
Status: DISCONTINUED | OUTPATIENT
Start: 2024-02-28 | End: 2024-02-28 | Stop reason: HOSPADM

## 2024-02-28 RX ORDER — ACETAMINOPHEN 325 MG/1
650 TABLET ORAL ONCE
Status: COMPLETED | OUTPATIENT
Start: 2024-02-28 | End: 2024-02-28

## 2024-02-28 RX ORDER — PROCHLORPERAZINE MALEATE 10 MG
10 TABLET ORAL EVERY 6 HOURS PRN
Status: DISCONTINUED | OUTPATIENT
Start: 2024-02-28 | End: 2024-02-28 | Stop reason: HOSPADM

## 2024-02-28 RX ORDER — HEPARIN SODIUM,PORCINE/PF 10 UNIT/ML
50 SYRINGE (ML) INTRAVENOUS AS NEEDED
Status: CANCELLED | OUTPATIENT
Start: 2024-02-28

## 2024-02-28 RX ORDER — OXYMETAZOLINE HCL 0.05 %
2 SPRAY, NON-AEROSOL (ML) NASAL ONCE
Status: DISCONTINUED | OUTPATIENT
Start: 2024-02-28 | End: 2024-02-28

## 2024-02-28 RX ORDER — FAMOTIDINE 10 MG/ML
20 INJECTION INTRAVENOUS ONCE AS NEEDED
Status: COMPLETED | OUTPATIENT
Start: 2024-02-28 | End: 2024-02-28

## 2024-02-28 RX ORDER — HEPARIN 100 UNIT/ML
500 SYRINGE INTRAVENOUS AS NEEDED
Status: CANCELLED | OUTPATIENT
Start: 2024-02-28

## 2024-02-28 RX ADMIN — DIPHENHYDRAMINE HYDROCHLORIDE 50 MG: 50 INJECTION INTRAMUSCULAR; INTRAVENOUS at 11:55

## 2024-02-28 RX ADMIN — ONDANSETRON 4 MG: 2 INJECTION INTRAMUSCULAR; INTRAVENOUS at 09:18

## 2024-02-28 RX ADMIN — SODIUM CHLORIDE 660 MG: 9 INJECTION, SOLUTION INTRAVENOUS at 10:15

## 2024-02-28 RX ADMIN — EPINEPHRINE 0.3 MG: 1 INJECTION INTRAMUSCULAR; INTRAVENOUS; SUBCUTANEOUS at 21:52

## 2024-02-28 RX ADMIN — METHYLPREDNISOLONE SODIUM SUCCINATE 40 MG: 40 INJECTION, POWDER, FOR SOLUTION INTRAMUSCULAR; INTRAVENOUS at 11:54

## 2024-02-28 RX ADMIN — DEXAMETHASONE SODIUM PHOSPHATE 16 MG: 10 INJECTION, SOLUTION INTRAMUSCULAR; INTRAVENOUS at 09:28

## 2024-02-28 RX ADMIN — DIPHENHYDRAMINE HYDROCHLORIDE 25 MG: 50 INJECTION, SOLUTION INTRAMUSCULAR; INTRAVENOUS at 09:51

## 2024-02-28 RX ADMIN — FAMOTIDINE 20 MG: 10 INJECTION, SOLUTION INTRAVENOUS at 21:50

## 2024-02-28 RX ADMIN — PREDNISONE 40 MG: 20 TABLET ORAL at 21:51

## 2024-02-28 RX ADMIN — FAMOTIDINE 20 MG: 10 INJECTION INTRAVENOUS at 11:54

## 2024-02-28 RX ADMIN — FAMOTIDINE 20 MG: 10 INJECTION INTRAVENOUS at 09:18

## 2024-02-28 RX ADMIN — ACETAMINOPHEN 650 MG: 325 TABLET ORAL at 09:17

## 2024-02-28 ASSESSMENT — PAIN DESCRIPTION - ORIENTATION: ORIENTATION: LEFT

## 2024-02-28 ASSESSMENT — PAIN DESCRIPTION - LOCATION: LOCATION: SHOULDER

## 2024-02-28 ASSESSMENT — PAIN SCALES - GENERAL
PAINLEVEL: 3
PAINLEVEL_OUTOF10: 0 - NO PAIN
PAINLEVEL_OUTOF10: 0 - NO PAIN
PAINLEVEL_OUTOF10: 2
PAINLEVEL_OUTOF10: 0 - NO PAIN

## 2024-02-28 ASSESSMENT — LIFESTYLE VARIABLES
HAVE YOU EVER FELT YOU SHOULD CUT DOWN ON YOUR DRINKING: NO
EVER HAD A DRINK FIRST THING IN THE MORNING TO STEADY YOUR NERVES TO GET RID OF A HANGOVER: NO
HAVE PEOPLE ANNOYED YOU BY CRITICIZING YOUR DRINKING: NO
EVER FELT BAD OR GUILTY ABOUT YOUR DRINKING: NO

## 2024-02-28 ASSESSMENT — PAIN DESCRIPTION - PAIN TYPE: TYPE: CHRONIC PAIN

## 2024-02-28 ASSESSMENT — PAIN - FUNCTIONAL ASSESSMENT: PAIN_FUNCTIONAL_ASSESSMENT: 0-10

## 2024-02-28 NOTE — SIGNIFICANT EVENT
02/28/24 0855   Prechemo Checklist   Has the patient been in the hospital, ED, or urgent care since last date of service No   Chemo/Immuno Consent Signed Yes  (2/22/24)   Protocol/Indications Verified Yes   Confirmed to previous date/time of medication N/A   Compared to previous dose N/A   All medications are dated accurately Yes   Pregnancy Test Negative Not applicable   Parameters Met Yes   BSA/Weight-Height Verified Yes   Dose Calculations Verified Yes

## 2024-02-28 NOTE — PROGRESS NOTES
Adverse Event Note     Name:Yoana Herzog  : 1955  MRN: [id number]      Adverse Event:  Medication: Rituximab  Administered Date/Time:  @ 1147  Reactions/Symptoms Started Time: itching throat   Symptoms: (check all that apply)   [] Back Pain   [] Erythema Face     [] Hypotension     [] Rash/Rigors [] Other   [] Bleeding    [] Erythema Hands  [x] Itching  [] Swelling/Edema [] Unknown   [] Chest Pain [] Hives/Urticaria     [] Low platelet Ct  [] Syncope   [] Cytopenia  [] Hypertension       [] Neutropenia    Severity: Mild   Provider Notified: Yes   Medications Given(Add all medications to the chart via , or treatment plan)   [] Acetaminophen-Tylenol       [x] Famotidine-Pepcid  [] Nitroglycerine-NTG   [] Albuterol                               [] Hydrocortisone       [] Ondansetron-Zofran   [x] Diphenhydramine-Benadryl  [] Lorazepam-Ativan  [] Naloxone-Narcan   [] Epinephrine-Epi                     [x] Methylprednisone   Additional Details/ Comments:   After approx 85% of the 150mg/hr infusion rate pt called RN to room reporting itching throat. Rituxan stopped and rescue meds administered. VS stable - BP slightly elevated - see flowsheet. Rosendo Herrera at bedside and instructed RN to wait 30 minutes and restart at the 100 mg/hr.

## 2024-02-28 NOTE — PROGRESS NOTES
"NUTRITION Assessment NOTE    Nutrition Assessment     Reason for Visit:  Yoana Herzog is a 68 y.o. female who presents for Rituxan for grade 1 follicular lymphoma of submandibular gland and axial skeleton.     Attempted to meet with patient, but unable to do so today. Will  try to met during next infusion.     This service will continue to follow.     Lab Results   Component Value Date/Time    GLUCOSE 84 02/21/2024 0836     02/21/2024 0836    K 4.1 02/21/2024 0836     02/21/2024 0836    CO2 29 02/21/2024 0836    ANIONGAP 12 02/21/2024 0836    BUN 16 02/21/2024 0836    CREATININE 0.75 02/21/2024 0836    EGFR 86 02/21/2024 0836    CALCIUM 9.4 02/21/2024 0836    ALBUMIN 4.4 02/21/2024 0836    ALKPHOS 64 02/21/2024 0836    PROT 6.7 02/21/2024 0836    AST 16 02/21/2024 0836    BILITOT 0.6 02/21/2024 0836    ALT 12 02/21/2024 0836     Lab Results   Component Value Date/Time    VITD25 51 12/28/2023 0733       Anthropometrics:  Anthropometrics  Height: 157.8 cm (5' 2.13\")  Weight: 69.2 kg (152 lb 8.9 oz)  BMI (Calculated): 27.79  IBW/kg (Dietitian Calculated): 50 kg  Percent of IBW: 138.4 %    Wt Readings from Last 10 Encounters:   02/28/24 69.2 kg (152 lb 8.9 oz)   02/28/24 69.2 kg (152 lb 8.9 oz)   02/22/24 69.8 kg (153 lb 14.1 oz)   01/08/24 69.9 kg (154 lb 1.6 oz)   01/08/24 70.2 kg (154 lb 12.2 oz)   12/18/23 69.2 kg (152 lb 9.6 oz)   12/08/23 70.1 kg (154 lb 8.7 oz)   12/04/23 70.3 kg (155 lb)   11/06/23 70.3 kg (155 lb)   09/14/23 70 kg (154 lb 6.4 oz)        Food And Nutrient Intake:                                                                 Nutrition Focused Physical Exam Findings:                          Energy Needs  Calculated Energy Needs Using Equations  Height: 157.8 cm (5' 2.13\")  Estimated Energy Needs  Total Energy Estimated Needs (kCal): 2076 kCal  Method for Estimating Needs: 30 kcal/kg CBW  Estimated Fluid Needs  Total Fluid Estimated Needs (mL): 2076 mL  Method for Estimating " Needs: 1 mL/kcal  Estimated Protein Needs  Total Protein Estimated Needs (g): 83.04 g  Method for Estimating Needs: 1.2g/kg CBW    Nutrition Diagnosis             Nutrition Interventions/Recommendations   Nutrition Prescription       Food and Nutrition Delivery       Nutrition Education       Coordination of Care       There are no Patient Instructions on file for this visit.    Nutrition Monitoring and Evaluation

## 2024-02-29 VITALS
WEIGHT: 152 LBS | DIASTOLIC BLOOD PRESSURE: 60 MMHG | RESPIRATION RATE: 16 BRPM | TEMPERATURE: 98.8 F | BODY MASS INDEX: 27.97 KG/M2 | OXYGEN SATURATION: 98 % | HEART RATE: 58 BPM | HEIGHT: 62 IN | SYSTOLIC BLOOD PRESSURE: 119 MMHG

## 2024-02-29 NOTE — ED NOTES
"Pt states,\" I am feeling much better, no difficulty swallowing, drinking fluids without complaints\".     Lisha Esparza RN  02/28/24 7866    "

## 2024-02-29 NOTE — ED NOTES
Pt states, I had my first infusion of Ruxience 660 mg today @ my dr office, started feeling in back of my throat tingling, they slowed down infusion , then I started feeling tingling on my tongue. Stopped all my infusion around 330, around 5 pm , I felt like my throat was tightening up, called my Dr back had me come to the ER.     Lisha Esparza RN  02/28/24 1758

## 2024-02-29 NOTE — ED PROVIDER NOTES
EMERGENCY DEPARTMENT ENCOUNTER      Pt Name: Yoana Herzog  MRN: [id number]  Birthdate 1955  Date of evaluation: 2/28/2024  Provider: Brian Doty DO    CHIEF COMPLAINT       Chief Complaint   Patient presents with    Allergic Reaction     Allergic reaction s/p immunosuppressive tx. Was pre treated with Pepcid, benadryl, & steroids prior to treatment.          HISTORY OF PRESENT ILLNESS    The patient is a 68-year-old female presenting with a chief complaint of concern for an allergic reaction to her rituximab infusion that she received today for treatment of her follicular lymphoma.  This was her first infusion.  She began to have itching and rash and feel like her throat was scratchy during infusion.  Denies any difficulty breathing.  Does not feel like it was causing her throat to close up.  Her symptoms have improved.  She received Benadryl Pepcid and Solu-Medrol while receiving the infusion.  She did take another Benadryl at home.  She finished her infusion at 330 this afternoon, 5.5 hours prior to arrival in the ER.          Nursing Notes were reviewed.    PAST MEDICAL HISTORY     Past Medical History:   Diagnosis Date    Pain in unspecified knee     Knee pain    Pain in unspecified shoulder     Shoulder pain         SURGICAL HISTORY       Past Surgical History:   Procedure Laterality Date    HYSTERECTOMY  04/09/2018    Hysterectomy    KNEE SURGERY  04/09/2018    Knee Surgery    TONSILLECTOMY  04/09/2018    Tonsillectomy         CURRENT MEDICATIONS       Discharge Medication List as of 2/29/2024 12:57 AM        CONTINUE these medications which have NOT CHANGED    Details   ascorbic acid (Vitamin C) 500 mg tablet Take 1 tablet (500 mg) by mouth once daily., Historical Med      calcium carbonate (Oscal) 500 mg calcium (1,250 mg) tablet Take 1 tablet (1,250 mg) by mouth 2 times a day with meals., Historical Med      cetirizine (ZyrTEC) 10 mg tablet Take 1 tablet (10 mg) by mouth once daily as needed.,  Historical Med      cholecalciferol (Vitamin D-3) 25 MCG (1000 UT) tablet Take 1 tablet (1,000 Units) by mouth once daily., Historical Med      clocortolone pivalate (Cloderm) 0.1 % cream if needed., Historical Med      fluticasone (Flonase Allergy Relief) 50 mcg/actuation nasal spray Administer into affected nostril(s) if needed., Historical Med      ivermectin (Soolantra) 1 % cream Apply topically once daily., Historical Med      magnesium oxide (Mag-Ox) 400 mg tablet Take 1 tablet (400 mg) by mouth once daily., Historical Med      multivitamin tablet Take 1 tablet by mouth once daily., Historical Med             ALLERGIES     Amoxicillin-pot clavulanate, Bee pollen, House dust, House dust mite, and Rituxan [rituximab]    FAMILY HISTORY       Family History   Problem Relation Name Age of Onset    Stroke Mother Destini Chavez     Diabetes Mother Destinicamilo Chavez     Hypertension Mother Destinicamilo Chavez     Other (Open Heart Surgery) Mother Destini Chavez     Stroke Father      Colon cancer Other Maternal Aunt           SOCIAL HISTORY       Social History     Socioeconomic History    Marital status:      Spouse name: Not on file    Number of children: Not on file    Years of education: Not on file    Highest education level: Not on file   Occupational History    Not on file   Tobacco Use    Smoking status: Never    Smokeless tobacco: Current   Vaping Use    Vaping Use: Never used   Substance and Sexual Activity    Alcohol use: Yes     Alcohol/week: 1.0 standard drink of alcohol     Types: 1 Glasses of wine per week     Comment: occasional    Drug use: Never    Sexual activity: Yes     Partners: Male     Birth control/protection: Female Sterilization     Comment: I do NOT have partners, ONLY MY .   Other Topics Concern    Not on file   Social History Narrative    Not on file     Social Determinants of Health     Financial Resource Strain: Not on file   Food Insecurity: Not on file   Transportation Needs:  Not on file   Physical Activity: Not on file   Stress: Not on file   Social Connections: Not on file   Intimate Partner Violence: Not on file   Housing Stability: Not on file       SCREENINGS                        PHYSICAL EXAM    (up to 7 for level 4, 8 or more for level 5)     ED Triage Vitals [02/28/24 2031]   Temperature Heart Rate Respirations BP   37.1 °C (98.8 °F) 67 18 121/64      Pulse Ox Temp Source Heart Rate Source Patient Position   97 % Temporal Monitor Sitting      BP Location FiO2 (%)     Left arm --       Physical Exam  Vitals and nursing note reviewed.   Constitutional:       General: She is not in acute distress.     Appearance: Normal appearance. She is not ill-appearing or toxic-appearing.   HENT:      Head: Normocephalic and atraumatic.      Right Ear: External ear normal.      Left Ear: External ear normal.      Nose: Nose normal.      Mouth/Throat:      Pharynx: No oropharyngeal exudate or posterior oropharyngeal erythema.      Comments: Normal dentition.  No edema.  Eyes:      General:         Right eye: No discharge.         Left eye: No discharge.      Extraocular Movements: Extraocular movements intact.      Conjunctiva/sclera: Conjunctivae normal.      Pupils: Pupils are equal, round, and reactive to light.   Cardiovascular:      Rate and Rhythm: Normal rate and regular rhythm.      Pulses: Normal pulses.      Heart sounds: Normal heart sounds. No murmur heard.  Pulmonary:      Effort: Pulmonary effort is normal. No respiratory distress.      Breath sounds: Normal breath sounds. No stridor. No wheezing or rhonchi.   Abdominal:      General: There is no distension.      Palpations: Abdomen is soft. There is no mass.      Tenderness: There is no abdominal tenderness. There is no guarding.   Musculoskeletal:         General: No deformity or signs of injury. Normal range of motion.   Skin:     General: Skin is warm and dry.   Neurological:      General: No focal deficit present.      Mental  Status: She is alert and oriented to person, place, and time. Mental status is at baseline.   Psychiatric:         Mood and Affect: Mood normal.         Behavior: Behavior normal.          DIAGNOSTIC RESULTS     LABS:  Labs Reviewed - No data to display    All other labs were within normal range or not returned as of this dictation.    Imaging  No orders to display        Procedures  Procedures     EMERGENCY DEPARTMENT COURSE/MDM:   Medical Decision Making  68-year-old female with history significant for follicular lymphoma on rituximab infusion presenting with concern for allergic reaction to rituximab.  Patient was receiving her first infusion today when she began to have a rash, itchiness and felt like her throat was itchy.  Infusion was completed at 330.  She received Benadryl Pepcid and Solu-Medrol this morning.  Took an additional Benadryl at home.  Overall, patient is very well-appearing on exam.  Vital signs are normal.  Lungs are clear bilaterally.  There is no swelling of the face neck or oropharynx.  No respiratory distress.  Overall, there is no need for further treatment at this time.  Does not need epinephrine IM as there is no concern for airway compromise at this time.    Patient was signed out to the overnight doctor pending further monitoring and discharge.        Please see ED course for additional MDM.    ED Course as of 02/29/24 1730 Wed Feb 28, 2024 2131 Orders placed for epi injection IM, Pepcid, prednisone [CL]      ED Course User Index  [CL] Brian Doty DO         Diagnoses as of 02/29/24 1730   Allergic reaction, initial encounter        Patient and or family in agreement and understanding of treatment plan.  All questions answered.      I reviewed the case with the attending ED physician. The attending ED physician agrees with the plan. Patient and/or patient´s representative was counseled regarding labs, imaging, likely diagnosis, and plan. All questions were answered.    ED  Medications administered this visit:    Medications   EPINEPHrine (Adrenalin) injection 0.3 mg (0.3 mg intramuscular Given 2/28/24 2152)   famotidine PF (Pepcid) injection 20 mg (20 mg intravenous Given 2/28/24 2150)   predniSONE (Deltasone) tablet 40 mg (40 mg oral Given 2/28/24 2151)       (Please note that portions of this note were completed with a voice recognition program.  Efforts were made to edit the dictations but occasionally words are mis-transcribed.)     Brian Doty,   Resident  02/29/24 5951

## 2024-02-29 NOTE — DISCHARGE INSTRUCTIONS
We have given you a prescription for prednisone to be taken for the next 5 days.  Please take this as prescribed to completion.  Please follow-up with your oncologist soon as you are able to.    If you have any return or worsening of symptoms including rash, facial swelling, neck swelling, throat swelling, difficulty breathing, lightheadedness, dizziness, chest pain or any new or concerning symptoms do not hesitate to return to the ER or seek immediate medical attention.

## 2024-03-05 ENCOUNTER — ALLIED HEALTH (OUTPATIENT)
Dept: INTEGRATIVE MEDICINE | Facility: CLINIC | Age: 69
End: 2024-03-05
Payer: MEDICARE

## 2024-03-05 DIAGNOSIS — M99.04 SEGMENTAL AND SOMATIC DYSFUNCTION OF SACRAL REGION: ICD-10-CM

## 2024-03-05 DIAGNOSIS — M99.03 SEGMENTAL AND SOMATIC DYSFUNCTION OF LUMBAR REGION: ICD-10-CM

## 2024-03-05 DIAGNOSIS — M99.00 SEGMENTAL AND SOMATIC DYSFUNCTION OF HEAD REGION: Primary | ICD-10-CM

## 2024-03-05 DIAGNOSIS — M47.816 LUMBAR SPONDYLOSIS: ICD-10-CM

## 2024-03-05 DIAGNOSIS — M99.02 SEGMENTAL AND SOMATIC DYSFUNCTION OF THORACIC REGION: ICD-10-CM

## 2024-03-05 DIAGNOSIS — M47.812 CERVICAL SPONDYLOSIS WITHOUT MYELOPATHY: ICD-10-CM

## 2024-03-05 DIAGNOSIS — M79.10 MYALGIA: ICD-10-CM

## 2024-03-05 DIAGNOSIS — M79.9 POSTURAL STRAIN: ICD-10-CM

## 2024-03-05 DIAGNOSIS — M53.3 SACRAL BACK PAIN: ICD-10-CM

## 2024-03-05 DIAGNOSIS — M54.2 NECK PAIN: ICD-10-CM

## 2024-03-05 DIAGNOSIS — M99.01 SEGMENTAL AND SOMATIC DYSFUNCTION OF CERVICAL REGION: ICD-10-CM

## 2024-03-05 DIAGNOSIS — M99.05 SEGMENTAL AND SOMATIC DYSFUNCTION OF PELVIC REGION: ICD-10-CM

## 2024-03-05 PROCEDURE — 98942 CHIROPRACTIC MANJ 5 REGIONS: CPT | Performed by: CHIROPRACTOR

## 2024-03-05 RX ORDER — FAMOTIDINE 10 MG/ML
40 INJECTION INTRAVENOUS ONCE
Status: CANCELLED
Start: 2024-03-06 | End: 2024-03-06

## 2024-03-05 RX ORDER — FAMOTIDINE 10 MG/ML
40 INJECTION INTRAVENOUS ONCE
Status: CANCELLED
Start: 2024-03-20 | End: 2024-03-20

## 2024-03-05 RX ORDER — DIPHENHYDRAMINE HYDROCHLORIDE 50 MG/ML
50 INJECTION INTRAMUSCULAR; INTRAVENOUS EVERY 6 HOURS PRN
Status: CANCELLED
Start: 2024-03-13

## 2024-03-05 RX ORDER — FAMOTIDINE 10 MG/ML
40 INJECTION INTRAVENOUS ONCE
Status: CANCELLED
Start: 2024-03-13 | End: 2024-03-13

## 2024-03-05 RX ORDER — DIPHENHYDRAMINE HYDROCHLORIDE 50 MG/ML
50 INJECTION INTRAMUSCULAR; INTRAVENOUS EVERY 6 HOURS PRN
Status: CANCELLED
Start: 2024-03-20

## 2024-03-05 ASSESSMENT — ENCOUNTER SYMPTOMS
DIFFICULTY URINATING: 0
CHILLS: 0
ABDOMINAL PAIN: 0
AGITATION: 0
SHORTNESS OF BREATH: 0
NAUSEA: 0
ALLERGIC/IMMUNOLOGIC COMMENTS: +SEASONAL ALLERGIES
FEVER: 0
CONFUSION: 0
FACIAL ASYMMETRY: 0

## 2024-03-05 NOTE — PROGRESS NOTES
"Subjective   Patient ID: Yoana Herzog is a 68 y.o. female who presents today for the treatment of pain involving their left sided neck and right sided hip/sacrum pain.    This is visit 2 of the 2024 calendar year. Medicare.    Fall risk: None. No falls in the last 6 months.     HPI -after her recent PET scan they decided to go ahead and start treating the lymphoma.  It was staged at 4.  She will be receiving infusions weekly until follow-up PET scan is performed to determine further course of treatment.  She is only had 1 infusion so far and it was pretty intense.  She ended up having a reaction and ended up going to the ER at [facility] that evening due to tightness in the throat.  There is some involvement with her lymphoma into the left clavicle area and under the left breast and she was told that she can continue with chiropractic care \"at her own risk\".  She and I discussed this and she would like to continue with the treatments because it does provide her significant pain relief.    Last treatment 2/6/24: since her last treatment encounter she did go to [place] with her family for a trip.  She did well but did have some aching pain in the lower back after all of the walking.  The neck held up better than she had expected.  We will focus treatment on the lower back hips and sacrum today but check neck and upper back as well as this is typically an area of tightness and stress for her.  Overall she is doing well no acute exacerbations.    1/10/24: she presents today with the same chief complaint of left greater than right trapezius and neck pain, and right greater than left sacral/lower back pains.  Nothing is acutely exacerbated, however she is anticipating a trip to [place] with her family in about a week and a half.  She wants to make sure she can move and hitting with her children and grandchildren for this trip.  No new symptoms.    Additional medical information: R lymph node removed 12/20/23, Dx with " follicular lymphoma. Had right submandibular mass that was found by her dentist in September 2023. Following with Oncology/Hematology Dr. Stevenson.      11/28/23: no acute pain issues today, however the left neck has been bothering her more and the right sacrum has been a little more stiff than usual.  She has been participating in the [employer] for [employer] and has recently finished decorating her home for [employer].  She also mentions that she is recovering from a head cold which has contributed to some lack of energy and physical movement over the last week.    10/23/23: Yoana presents today with the same complaints of left sided neck and right side hip/sacrum pain. She has been doing well since our last encounter as she would like to focus treatment on the same regions as it has been beneficial for her. 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 No additional injuries or changes in her medical history.    9/26/23:  She would like to continue to focus treatment on the left greater than right upper trapezius and neck regions. 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 Therefore she has been doing a lot of bending lifting and reaching. She has not been doing this consecutive days so she has days in between for recovery and rest. She also continues to have soreness and tightness in the right sacrum which is another chronic area that has regular exacerbations and flareups.      INITIAL INTAKE/SUBJECTIVE 12/12/22: She has seen chiropractic in the past. Her last adjustment was about 5 weeks ago. Is not that she is dissatisfied with the care she has been given, but she is hoping that our treatments will help her completely resolve the issue to the point where she does not need regular care.     Today her primary complaint is left neck and shoulder pain. 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 She also enjoys sewing and notices this pain does get worse after she has been doing a lot of sewing. She will occasionally feel tingling into the left hand most notably in the #3 through 5 fingers. She does have some headaches but these are localized to the suboccipital region and do not radiate into the forehead eyes or temples. In the past her physical therapist and chiropractors have done manual stretching of the left shoulder and done adjustments which have helped.     Her secondary pain complaints are in the right hip glued and sacral pains. She does sleep with a pillow between the knees. She is unsure why but the last 6 to 12 months this has been getting progressively worse. She will occasionally have pain in the hamstring region but this is rare. She will occasionally feel sharp intense pain in the hip or the lower back but this only typically lasts a few seconds. Generally is more of a dull aching pain. The point of max tenderness is localized to the middle of the right butt cheek. No one has worked on this or massage it in the past.    Review of Systems   Constitutional:  Negative for chills and fever.   HENT:  Negative for congestion and sneezing.    Eyes:  Negative for visual disturbance.   Respiratory:  Negative for shortness of breath.    Cardiovascular:  Negative for chest pain.   Gastrointestinal:  Negative for abdominal pain and nausea.   Endocrine: Negative for polyuria.        R cervical lymph node removed 12/20/23, Dx with follicular lymphoma- Receiving immunotherapy via infusions.    Genitourinary:  Negative for difficulty urinating.        2004 Hysterectomy   Musculoskeletal:         +Osteopenia, BL knee surgeries 1960/1970.   Skin:         +Rosacea    Allergic/Immunologic: Positive for environmental allergies.        +seasonal allergies   Neurological:  Negative for facial asymmetry.   Psychiatric/Behavioral:  Negative for agitation and confusion.       Objective   Observation : normal gait and forward head posture    Physical Exam  Examination findings (palpation & ROM): Tenderness palpated over the UT and levator scapulae on the left. Hypertonicity palpated over the lumbar paraspinals, right>left piriformis, right>left SIJ, right> left gluteal regions.    Segmental joint dysfunction was identified in the following areas using motion palpation and/or pain provocation assessment:  Cervical: C0-C3 (Supine)   Thoracic: T4-T9 (Prone)  Lumbar: L3-L5 (Scoop)  Sacrum: L5/S1 and right SI (Drop)    Technique Used: diversified CMT, pelvic drop table technique and manual traction.      Today's treatment:  Performed spinal manipulation to the regions of segmental dysfunction identified on examination using age-appropriate and injury specific force, and manual diversified technique.     Brief Seated IASTM and supine MFR to the L>R neck, upper trapezius and L shoulder region. Prone MFR, IC to the lumbar paraspinals, upper gluteals, SIJ, piriformis.         Treatment Plan:   The patient and I discussed the risks and benefits of chiropractic care. Based on the patient's subjective complaints along with the examination findings, it is advised that a course of chiropractic treatment by initiated. Consent for care was given both written and orally by the patient. The patient tolerated today's treatment with little or no additional discomfort and was instructed to contact the office for questions or concerns.     Treatment Frequency: Will see/treat patient every 2-4 weeks as therapeutic benefit is sustained, then frequency will be reduced to as needed for symptom management or as needed for acute flare-ups/exacerbation.     Please note: Voice-to-text software was used when completing this note.  While the note was proofread, portions may include grammatical errors.  Please contact me with any questions/concerns as it relates to these types of errors.

## 2024-03-06 ENCOUNTER — INFUSION (OUTPATIENT)
Dept: HEMATOLOGY/ONCOLOGY | Facility: CLINIC | Age: 69
End: 2024-03-06
Payer: MEDICARE

## 2024-03-06 ENCOUNTER — SOCIAL WORK (OUTPATIENT)
Dept: HEMATOLOGY/ONCOLOGY | Facility: CLINIC | Age: 69
End: 2024-03-06

## 2024-03-06 VITALS
OXYGEN SATURATION: 97 % | BODY MASS INDEX: 27.42 KG/M2 | WEIGHT: 149.91 LBS | DIASTOLIC BLOOD PRESSURE: 76 MMHG | SYSTOLIC BLOOD PRESSURE: 129 MMHG | TEMPERATURE: 99.1 F | HEART RATE: 64 BPM | RESPIRATION RATE: 16 BRPM

## 2024-03-06 DIAGNOSIS — C82.08 GRADE 1 FOLLICULAR LYMPHOMA OF LYMPH NODES OF MULTIPLE REGIONS (MULTI): ICD-10-CM

## 2024-03-06 LAB
ALBUMIN SERPL BCP-MCNC: 4.4 G/DL (ref 3.4–5)
ALP SERPL-CCNC: 60 U/L (ref 33–136)
ALT SERPL W P-5'-P-CCNC: 12 U/L (ref 7–45)
ANION GAP SERPL CALC-SCNC: 12 MMOL/L (ref 10–20)
AST SERPL W P-5'-P-CCNC: 15 U/L (ref 9–39)
BASOPHILS # BLD AUTO: 0.04 X10*3/UL (ref 0–0.1)
BASOPHILS NFR BLD AUTO: 0.7 %
BILIRUB SERPL-MCNC: 0.4 MG/DL (ref 0–1.2)
BUN SERPL-MCNC: 14 MG/DL (ref 6–23)
CALCIUM SERPL-MCNC: 9.5 MG/DL (ref 8.6–10.3)
CHLORIDE SERPL-SCNC: 105 MMOL/L (ref 98–107)
CO2 SERPL-SCNC: 27 MMOL/L (ref 21–32)
CREAT SERPL-MCNC: 0.66 MG/DL (ref 0.5–1.05)
EGFRCR SERPLBLD CKD-EPI 2021: >90 ML/MIN/1.73M*2
EOSINOPHIL # BLD AUTO: 0.13 X10*3/UL (ref 0–0.7)
EOSINOPHIL NFR BLD AUTO: 2.2 %
ERYTHROCYTE [DISTWIDTH] IN BLOOD BY AUTOMATED COUNT: 13.3 % (ref 11.5–14.5)
GLUCOSE SERPL-MCNC: 113 MG/DL (ref 74–99)
HCT VFR BLD AUTO: 42.7 % (ref 36–46)
HGB BLD-MCNC: 13.5 G/DL (ref 12–16)
IMM GRANULOCYTES # BLD AUTO: 0.01 X10*3/UL (ref 0–0.7)
IMM GRANULOCYTES NFR BLD AUTO: 0.2 % (ref 0–0.9)
LDH SERPL L TO P-CCNC: 155 U/L (ref 84–246)
LYMPHOCYTES # BLD AUTO: 1.19 X10*3/UL (ref 1.2–4.8)
LYMPHOCYTES NFR BLD AUTO: 20.1 %
MCH RBC QN AUTO: 27 PG (ref 26–34)
MCHC RBC AUTO-ENTMCNC: 31.6 G/DL (ref 32–36)
MCV RBC AUTO: 85 FL (ref 80–100)
MONOCYTES # BLD AUTO: 0.53 X10*3/UL (ref 0.1–1)
MONOCYTES NFR BLD AUTO: 8.9 %
NEUTROPHILS # BLD AUTO: 4.03 X10*3/UL (ref 1.2–7.7)
NEUTROPHILS NFR BLD AUTO: 67.9 %
PLATELET # BLD AUTO: 220 X10*3/UL (ref 150–450)
POTASSIUM SERPL-SCNC: 3.7 MMOL/L (ref 3.5–5.3)
PROT SERPL-MCNC: 6.7 G/DL (ref 6.4–8.2)
RBC # BLD AUTO: 5 X10*6/UL (ref 4–5.2)
SODIUM SERPL-SCNC: 140 MMOL/L (ref 136–145)
URATE SERPL-MCNC: 4 MG/DL (ref 2.3–6.7)
WBC # BLD AUTO: 5.9 X10*3/UL (ref 4.4–11.3)

## 2024-03-06 PROCEDURE — 96413 CHEMO IV INFUSION 1 HR: CPT

## 2024-03-06 PROCEDURE — 96375 TX/PRO/DX INJ NEW DRUG ADDON: CPT | Mod: INF

## 2024-03-06 PROCEDURE — 83615 LACTATE (LD) (LDH) ENZYME: CPT

## 2024-03-06 PROCEDURE — 85025 COMPLETE CBC W/AUTO DIFF WBC: CPT

## 2024-03-06 PROCEDURE — 2500000004 HC RX 250 GENERAL PHARMACY W/ HCPCS (ALT 636 FOR OP/ED): Performed by: INTERNAL MEDICINE

## 2024-03-06 PROCEDURE — 96374 THER/PROPH/DIAG INJ IV PUSH: CPT | Mod: INF

## 2024-03-06 PROCEDURE — 84075 ASSAY ALKALINE PHOSPHATASE: CPT

## 2024-03-06 PROCEDURE — 84550 ASSAY OF BLOOD/URIC ACID: CPT

## 2024-03-06 PROCEDURE — 96415 CHEMO IV INFUSION ADDL HR: CPT

## 2024-03-06 RX ORDER — FAMOTIDINE 10 MG/ML
40 INJECTION INTRAVENOUS ONCE
Status: COMPLETED | OUTPATIENT
Start: 2024-03-06 | End: 2024-03-06

## 2024-03-06 RX ORDER — FAMOTIDINE 10 MG/ML
20 INJECTION INTRAVENOUS ONCE AS NEEDED
Status: DISCONTINUED | OUTPATIENT
Start: 2024-03-06 | End: 2024-03-06 | Stop reason: HOSPADM

## 2024-03-06 RX ORDER — PROCHLORPERAZINE EDISYLATE 5 MG/ML
10 INJECTION INTRAMUSCULAR; INTRAVENOUS EVERY 6 HOURS PRN
Status: DISCONTINUED | OUTPATIENT
Start: 2024-03-06 | End: 2024-03-06 | Stop reason: HOSPADM

## 2024-03-06 RX ORDER — PROCHLORPERAZINE MALEATE 10 MG
10 TABLET ORAL EVERY 6 HOURS PRN
Status: DISCONTINUED | OUTPATIENT
Start: 2024-03-06 | End: 2024-03-06 | Stop reason: HOSPADM

## 2024-03-06 RX ORDER — HEPARIN 100 UNIT/ML
500 SYRINGE INTRAVENOUS AS NEEDED
Status: CANCELLED | OUTPATIENT
Start: 2024-03-06

## 2024-03-06 RX ORDER — ONDANSETRON HYDROCHLORIDE 2 MG/ML
4 INJECTION, SOLUTION INTRAVENOUS ONCE
Status: COMPLETED | OUTPATIENT
Start: 2024-03-06 | End: 2024-03-06

## 2024-03-06 RX ORDER — EPINEPHRINE 0.3 MG/.3ML
0.3 INJECTION SUBCUTANEOUS EVERY 5 MIN PRN
Status: DISCONTINUED | OUTPATIENT
Start: 2024-03-06 | End: 2024-03-06 | Stop reason: HOSPADM

## 2024-03-06 RX ORDER — HEPARIN SODIUM,PORCINE/PF 10 UNIT/ML
50 SYRINGE (ML) INTRAVENOUS AS NEEDED
Status: CANCELLED | OUTPATIENT
Start: 2024-03-06

## 2024-03-06 RX ORDER — ACETAMINOPHEN 325 MG/1
650 TABLET ORAL ONCE
Status: COMPLETED | OUTPATIENT
Start: 2024-03-06 | End: 2024-03-06

## 2024-03-06 RX ORDER — DIPHENHYDRAMINE HYDROCHLORIDE 50 MG/ML
50 INJECTION INTRAMUSCULAR; INTRAVENOUS AS NEEDED
Status: DISCONTINUED | OUTPATIENT
Start: 2024-03-06 | End: 2024-03-06 | Stop reason: HOSPADM

## 2024-03-06 RX ORDER — HEPARIN 100 UNIT/ML
500 SYRINGE INTRAVENOUS AS NEEDED
Status: DISCONTINUED | OUTPATIENT
Start: 2024-03-06 | End: 2024-03-06 | Stop reason: HOSPADM

## 2024-03-06 RX ORDER — ALBUTEROL SULFATE 0.83 MG/ML
3 SOLUTION RESPIRATORY (INHALATION) AS NEEDED
Status: DISCONTINUED | OUTPATIENT
Start: 2024-03-06 | End: 2024-03-06 | Stop reason: HOSPADM

## 2024-03-06 RX ORDER — HEPARIN SODIUM,PORCINE/PF 10 UNIT/ML
50 SYRINGE (ML) INTRAVENOUS AS NEEDED
Status: DISCONTINUED | OUTPATIENT
Start: 2024-03-06 | End: 2024-03-06 | Stop reason: HOSPADM

## 2024-03-06 RX ADMIN — DEXAMETHASONE SODIUM PHOSPHATE 16 MG: 10 INJECTION, SOLUTION INTRAMUSCULAR; INTRAVENOUS at 11:22

## 2024-03-06 RX ADMIN — ACETAMINOPHEN 650 MG: 325 TABLET ORAL at 11:13

## 2024-03-06 RX ADMIN — HYDROCORTISONE SODIUM SUCCINATE 100 MG: 100 INJECTION, POWDER, FOR SOLUTION INTRAMUSCULAR; INTRAVENOUS at 10:52

## 2024-03-06 RX ADMIN — FAMOTIDINE 40 MG: 10 INJECTION INTRAVENOUS at 11:13

## 2024-03-06 RX ADMIN — ONDANSETRON 4 MG: 2 INJECTION INTRAMUSCULAR; INTRAVENOUS at 11:13

## 2024-03-06 RX ADMIN — SODIUM CHLORIDE 660 MG: 9 INJECTION, SOLUTION INTRAVENOUS at 11:47

## 2024-03-06 RX ADMIN — DIPHENHYDRAMINE HYDROCHLORIDE 50 MG: 50 INJECTION, SOLUTION INTRAMUSCULAR; INTRAVENOUS at 10:54

## 2024-03-06 ASSESSMENT — PAIN SCALES - GENERAL: PAINLEVEL: 0-NO PAIN

## 2024-03-06 NOTE — SIGNIFICANT EVENT
03/06/24 1029   Prechemo Checklist   Has the patient been in the hospital, ED, or urgent care since last date of service Yes  (MD aware pt went to ED night after 1st dose reaction to Rituxan)   Chemo/Immuno Consent Signed Yes  (2/22/24)   Protocol/Indications Verified Yes   Confirmed to previous date/time of medication Yes   Compared to previous dose Yes   All medications are dated accurately Yes   Pregnancy Test Negative Not applicable   Parameters Met Yes   BSA/Weight-Height Verified Yes   Dose Calculations Verified Yes

## 2024-03-08 NOTE — PROGRESS NOTES
The patient is a 68 year old woman who has been diagnosed with follicular lymphoma. I met with her as she was receiving treatment today per her request. She shared that she is interested in meeting with a counselor/therapist. I offered to look into her insurance and determine which area agencies are in-network and provide her contact information. She is agreeable and mentioned that she would be interested in working with [facility] [place] if they are in-network. I reached out to [facility] [place] and learned that they do accept the patient's [employer] [place] Medicare Advantage. I explained the same to the patient and provided [facility]'s brochure and contact information. She expressed appreciation and agreed to follow-up. I also provided my contact information and encouraged her to reach out to me as needed. Social work will remain available to assist this patient.    Angela Shin, ELLEN, LUIS-S, ACHP-SW

## 2024-03-11 ENCOUNTER — TELEPHONE (OUTPATIENT)
Dept: HEMATOLOGY/ONCOLOGY | Facility: CLINIC | Age: 69
End: 2024-03-11
Payer: MEDICARE

## 2024-03-11 NOTE — TELEPHONE ENCOUNTER
VM: Message for Keren.  Patient had excruciating pain in left lower teeth.  Has dental appointment this today at 1pm.  Has been in ibuprofen since early yesterday around the clock for significant pain.  Has also used hydrogen peroxide and floss.  Her next immunotherapy tx is 3/13/24.

## 2024-03-11 NOTE — TELEPHONE ENCOUNTER
I spoke with benigno Lees she states that she woke up with pain in the left lower gum/teeth area that sent her to the dentist today. She states that her dentist did an examination and xray of the area and they notified her that she has inflammation in her gums and that there is nothing wrong with the teeth. They gave her a water pik, iorinse and stellagel that should all help with any pain/swelling. She is then going to see her dentist on 3/26/24 for a cleaning and to be re-evaluated. I did notify her that Dr. Stevenson is aware that she was having the pain in the lower jaw/tooth area and wanted an update and that Yoana is to call if any extensive dental work was to be done; did state that this wouldn't affect her current treatment. Yoana is aware that she is to call her dentist if her symptoms do not improve or worsen and then if so she is to call the clinic and provide an update at that time as well. She had no other questions and was appreciative of the call.

## 2024-03-13 ENCOUNTER — INFUSION (OUTPATIENT)
Dept: HEMATOLOGY/ONCOLOGY | Facility: CLINIC | Age: 69
End: 2024-03-13
Payer: MEDICARE

## 2024-03-13 VITALS
DIASTOLIC BLOOD PRESSURE: 67 MMHG | SYSTOLIC BLOOD PRESSURE: 116 MMHG | BODY MASS INDEX: 27.58 KG/M2 | OXYGEN SATURATION: 95 % | HEART RATE: 68 BPM | TEMPERATURE: 99.3 F | WEIGHT: 150.79 LBS | RESPIRATION RATE: 16 BRPM

## 2024-03-13 DIAGNOSIS — C82.08 GRADE 1 FOLLICULAR LYMPHOMA OF LYMPH NODES OF MULTIPLE REGIONS (MULTI): Primary | ICD-10-CM

## 2024-03-13 LAB
ALBUMIN SERPL BCP-MCNC: 4.5 G/DL (ref 3.4–5)
ALP SERPL-CCNC: 69 U/L (ref 33–136)
ALT SERPL W P-5'-P-CCNC: 15 U/L (ref 7–45)
ANION GAP SERPL CALC-SCNC: 11 MMOL/L (ref 10–20)
AST SERPL W P-5'-P-CCNC: 17 U/L (ref 9–39)
BASOPHILS # BLD AUTO: 0.05 X10*3/UL (ref 0–0.1)
BASOPHILS NFR BLD AUTO: 0.7 %
BILIRUB SERPL-MCNC: 0.6 MG/DL (ref 0–1.2)
BUN SERPL-MCNC: 18 MG/DL (ref 6–23)
CALCIUM SERPL-MCNC: 9.8 MG/DL (ref 8.6–10.3)
CHLORIDE SERPL-SCNC: 104 MMOL/L (ref 98–107)
CO2 SERPL-SCNC: 29 MMOL/L (ref 21–32)
CREAT SERPL-MCNC: 0.63 MG/DL (ref 0.5–1.05)
EGFRCR SERPLBLD CKD-EPI 2021: >90 ML/MIN/1.73M*2
EOSINOPHIL # BLD AUTO: 0.12 X10*3/UL (ref 0–0.7)
EOSINOPHIL NFR BLD AUTO: 1.8 %
ERYTHROCYTE [DISTWIDTH] IN BLOOD BY AUTOMATED COUNT: 13.5 % (ref 11.5–14.5)
GLUCOSE SERPL-MCNC: 98 MG/DL (ref 74–99)
HCT VFR BLD AUTO: 42.3 % (ref 36–46)
HGB BLD-MCNC: 13.5 G/DL (ref 12–16)
IMM GRANULOCYTES # BLD AUTO: 0.01 X10*3/UL (ref 0–0.7)
IMM GRANULOCYTES NFR BLD AUTO: 0.1 % (ref 0–0.9)
LDH SERPL L TO P-CCNC: 152 U/L (ref 84–246)
LYMPHOCYTES # BLD AUTO: 1.14 X10*3/UL (ref 1.2–4.8)
LYMPHOCYTES NFR BLD AUTO: 17 %
MCH RBC QN AUTO: 27.2 PG (ref 26–34)
MCHC RBC AUTO-ENTMCNC: 31.9 G/DL (ref 32–36)
MCV RBC AUTO: 85 FL (ref 80–100)
MONOCYTES # BLD AUTO: 0.56 X10*3/UL (ref 0.1–1)
MONOCYTES NFR BLD AUTO: 8.3 %
NEUTROPHILS # BLD AUTO: 4.83 X10*3/UL (ref 1.2–7.7)
NEUTROPHILS NFR BLD AUTO: 72.1 %
PLATELET # BLD AUTO: 229 X10*3/UL (ref 150–450)
POTASSIUM SERPL-SCNC: 3.8 MMOL/L (ref 3.5–5.3)
PROT SERPL-MCNC: 6.8 G/DL (ref 6.4–8.2)
RBC # BLD AUTO: 4.97 X10*6/UL (ref 4–5.2)
SODIUM SERPL-SCNC: 140 MMOL/L (ref 136–145)
URATE SERPL-MCNC: 4.5 MG/DL (ref 2.3–6.7)
WBC # BLD AUTO: 6.7 X10*3/UL (ref 4.4–11.3)

## 2024-03-13 PROCEDURE — 84550 ASSAY OF BLOOD/URIC ACID: CPT

## 2024-03-13 PROCEDURE — 96413 CHEMO IV INFUSION 1 HR: CPT

## 2024-03-13 PROCEDURE — 85025 COMPLETE CBC W/AUTO DIFF WBC: CPT

## 2024-03-13 PROCEDURE — 80053 COMPREHEN METABOLIC PANEL: CPT

## 2024-03-13 PROCEDURE — 96375 TX/PRO/DX INJ NEW DRUG ADDON: CPT | Mod: INF

## 2024-03-13 PROCEDURE — 96415 CHEMO IV INFUSION ADDL HR: CPT

## 2024-03-13 PROCEDURE — 2500000004 HC RX 250 GENERAL PHARMACY W/ HCPCS (ALT 636 FOR OP/ED): Performed by: INTERNAL MEDICINE

## 2024-03-13 PROCEDURE — 83615 LACTATE (LD) (LDH) ENZYME: CPT

## 2024-03-13 RX ORDER — PROCHLORPERAZINE EDISYLATE 5 MG/ML
10 INJECTION INTRAMUSCULAR; INTRAVENOUS EVERY 6 HOURS PRN
Status: DISCONTINUED | OUTPATIENT
Start: 2024-03-13 | End: 2024-03-13 | Stop reason: HOSPADM

## 2024-03-13 RX ORDER — FAMOTIDINE 10 MG/ML
20 INJECTION INTRAVENOUS ONCE AS NEEDED
Status: DISCONTINUED | OUTPATIENT
Start: 2024-03-13 | End: 2024-03-13 | Stop reason: HOSPADM

## 2024-03-13 RX ORDER — EPINEPHRINE 0.3 MG/.3ML
0.3 INJECTION SUBCUTANEOUS EVERY 5 MIN PRN
Status: DISCONTINUED | OUTPATIENT
Start: 2024-03-13 | End: 2024-03-13 | Stop reason: HOSPADM

## 2024-03-13 RX ORDER — PROCHLORPERAZINE MALEATE 10 MG
10 TABLET ORAL EVERY 6 HOURS PRN
Status: DISCONTINUED | OUTPATIENT
Start: 2024-03-13 | End: 2024-03-13 | Stop reason: HOSPADM

## 2024-03-13 RX ORDER — HEPARIN SODIUM,PORCINE/PF 10 UNIT/ML
50 SYRINGE (ML) INTRAVENOUS AS NEEDED
Status: CANCELLED | OUTPATIENT
Start: 2024-03-13

## 2024-03-13 RX ORDER — ALBUTEROL SULFATE 0.83 MG/ML
3 SOLUTION RESPIRATORY (INHALATION) AS NEEDED
Status: DISCONTINUED | OUTPATIENT
Start: 2024-03-13 | End: 2024-03-13 | Stop reason: HOSPADM

## 2024-03-13 RX ORDER — HEPARIN 100 UNIT/ML
500 SYRINGE INTRAVENOUS AS NEEDED
Status: CANCELLED | OUTPATIENT
Start: 2024-03-13

## 2024-03-13 RX ORDER — DIPHENHYDRAMINE HYDROCHLORIDE 50 MG/ML
50 INJECTION INTRAMUSCULAR; INTRAVENOUS EVERY 6 HOURS PRN
Status: DISCONTINUED | OUTPATIENT
Start: 2024-03-13 | End: 2024-03-13

## 2024-03-13 RX ORDER — FAMOTIDINE 10 MG/ML
40 INJECTION INTRAVENOUS ONCE
Status: COMPLETED | OUTPATIENT
Start: 2024-03-13 | End: 2024-03-13

## 2024-03-13 RX ORDER — ONDANSETRON HYDROCHLORIDE 2 MG/ML
4 INJECTION, SOLUTION INTRAVENOUS ONCE
Status: COMPLETED | OUTPATIENT
Start: 2024-03-13 | End: 2024-03-13

## 2024-03-13 RX ORDER — ACETAMINOPHEN 325 MG/1
650 TABLET ORAL ONCE
Status: COMPLETED | OUTPATIENT
Start: 2024-03-13 | End: 2024-03-13

## 2024-03-13 RX ORDER — DIPHENHYDRAMINE HYDROCHLORIDE 50 MG/ML
50 INJECTION INTRAMUSCULAR; INTRAVENOUS AS NEEDED
Status: DISCONTINUED | OUTPATIENT
Start: 2024-03-13 | End: 2024-03-13 | Stop reason: HOSPADM

## 2024-03-13 RX ADMIN — FAMOTIDINE 40 MG: 10 INJECTION INTRAVENOUS at 10:23

## 2024-03-13 RX ADMIN — HYDROCORTISONE SODIUM SUCCINATE 100 MG: 100 INJECTION, POWDER, FOR SOLUTION INTRAMUSCULAR; INTRAVENOUS at 10:39

## 2024-03-13 RX ADMIN — DEXAMETHASONE SODIUM PHOSPHATE 16 MG: 10 INJECTION, SOLUTION INTRAMUSCULAR; INTRAVENOUS at 11:13

## 2024-03-13 RX ADMIN — DIPHENHYDRAMINE HYDROCHLORIDE 50 MG: 50 INJECTION, SOLUTION INTRAMUSCULAR; INTRAVENOUS at 10:39

## 2024-03-13 RX ADMIN — ONDANSETRON 4 MG: 2 INJECTION INTRAMUSCULAR; INTRAVENOUS at 10:23

## 2024-03-13 RX ADMIN — SODIUM CHLORIDE 660 MG: 9 INJECTION, SOLUTION INTRAVENOUS at 11:47

## 2024-03-13 RX ADMIN — ACETAMINOPHEN 650 MG: 325 TABLET ORAL at 10:23

## 2024-03-13 ASSESSMENT — PAIN SCALES - GENERAL: PAINLEVEL: 0-NO PAIN

## 2024-03-13 NOTE — SIGNIFICANT EVENT
03/13/24 1003   Prechemo Checklist   Has the patient been in the hospital, ED, or urgent care since last date of service No   Chemo/Immuno Consent Signed Yes   Protocol/Indications Verified Yes   Confirmed to previous date/time of medication Yes   Compared to previous dose Yes   All medications are dated accurately Yes   Pregnancy Test Negative Not applicable   Parameters Met Yes   BSA/Weight-Height Verified Yes   Dose Calculations Verified Yes

## 2024-03-18 ENCOUNTER — TELEPHONE (OUTPATIENT)
Dept: HEMATOLOGY/ONCOLOGY | Facility: CLINIC | Age: 69
End: 2024-03-18
Payer: MEDICARE

## 2024-03-18 NOTE — TELEPHONE ENCOUNTER
Yoana calling office back.  Urgent Care tested her for Strep, Flu A and B, and COVID.  All were negative.  They believe she has an upper respiratory infection.      Patient made aware, by this , that ok to still come in for treatment on 3/20/24 with a mask on and to see Michael at 9:30am.  Patient agreeable and aware.

## 2024-03-18 NOTE — TELEPHONE ENCOUNTER
I spoke with Yoana, she states that since this weekend she has had a very dry throat, head/nasal congestion, fatigue/tiredness, headache and  today she also has had 3 softer bowel movements with 1 watery one. She has denies fever, chills, SOB, chest pain, cough, abdominal pain/cramping, nausea/vomiting. She is staying hydrated and is eating less but is still eating. She denies taking a COVID or strep test. She states that so far she has tried taking tylenol and herbal tea for symptoms;she states that she also takes Flonase and zyrtec daily. I did notify her that it is recommended that she go to urgent care to be evaluated and then she should give the clinic a call with updates after that visit and at that time it will be determined if her rituximab will be kept the same or delayed. She gave verbal understanding, was appreciative and had no other questions at this time.

## 2024-03-18 NOTE — TELEPHONE ENCOUNTER
Patient has sore throat, congestion.  Daughter had same and was negative for strep.  Started on Saturday.  Patient has not been tested or seen by physician.  Asking for what to do regarding upcoming infusion appointment.

## 2024-03-19 NOTE — PROGRESS NOTES
Patient ID: Yoana Herzog is a 69 y.o. female.    Subjective    HPI  MsDavid Herzog is a 69 y/o F presenting for follow-up for follicular lymphoma.      Since last visit, patient had a PET CT on 2/8/2024, which shows hypermetabolic activity within bilateral subcentimeter axillary lymph nodes, a left internal mammary lymph node, and bilateral inguinal lymph nodes. Scattered hypermetabolic activity throughout the axial and appendicular skeleton corresponding to lytic lesions seen on CT compatible with FDG avid osseous disease.     Patient reports that she has been having some new bone pain. Denies any other new pain. Denies any new chest pain, cough or shortness of breath. No new lumps or bumps or rashes. No other complaints today.     Interval History:   Patient presents for fourth and final dose of weekly Rituxan.  She went to urgent care a few days ago due to having a sore throat, she was swabbed for COVID, flu, and strep throat and all were negative per patient's report.  She took allergy over-the-counter medications yesterday and reports significant improvement in her symptoms, she has not had any fevers, chills, shortness of breath, chest pain, heart palpitations, appetite loss or significant fatigue.  She denies discolored sputum.  She reports she is drinking water and eating a balanced diet.  She reports that other than her chronic pain, her pain has completely resolved.  She is going to visit family in [place] this weekend.    Note the patient did have reaction to her first dose of Rituxan that led to an emergency room visit      Patient's past medical history, surgical history, family history and social history reviewed.    Objective    Visit Vitals  /81 Comment: BP recheck   Pulse 67   Temp 36.2 °C (97.2 °F)   Resp 16   Wt 68.7 kg (151 lb 7.3 oz)   SpO2 99%   BMI 27.70 kg/m²   OB Status Hysterectomy   Smoking Status Never   BSA 1.73 m²           Review of Systems:   Review of Systems:     Positive per HPI, otherwise negative.     Physical Exam:   Constitutional: Patient appears in no acute distress.   Sitting comfortably in chair.  Eyes: EOMI, clear sclera  ENMT: mucous membranes moist, no apparent injury  Head/Neck: Neck supple, no JVD  Respiratory/Thorax: Patent airways, CTAB, normal  breath sounds, no increased work of breathing  Cardiovascular: Regular, rate and rhythm, no murmurs  Gastrointestinal: Nondistended, soft, non-tender,  no rebound tenderness or guarding, no masses palpable  Extremities: normal extremities, no cyanosis edema,  no swelling  Neurological: alert and oriented x3, nonfocal, normal  speech and hearing  Lymphatic: No palpable lymphadenopathy in cervical,  axillary  lymph nodes.  Spleen appears normal size.  Psychological: Appropriate mood and behavior, normal  affect  Skin: Warm and dry, no lesions, no rashes      Performance Status:  Symptomatic; fully ambulatory    Labs/Imaging/Pathology: personally reviewed reports and images in Epic electronic medical record system. Pertinent results as it related to the plan represented in below in assessment and plan.      Labs:  Lab Results   Component Value Date    WBC 8.9 03/20/2024    NEUTROABS 6.88 03/20/2024    IGABSOL 0.01 03/20/2024    LYMPHSABS 1.12 (L) 03/20/2024    MONOSABS 0.64 03/20/2024    EOSABS 0.18 03/20/2024    BASOSABS 0.04 03/20/2024    RBC 5.32 (H) 03/20/2024    MCV 86 03/20/2024    MCHC 31.2 (L) 03/20/2024    HGB 14.3 03/20/2024    HCT 45.9 03/20/2024     03/20/2024     Lab Results   Component Value Date    CREATININE 0.65 03/20/2024    BUN 21 03/20/2024    EGFR >90 03/20/2024     03/20/2024    K 4.8 03/20/2024     03/20/2024    CO2 28 03/20/2024      Lab Results   Component Value Date    ALT 15 03/20/2024    AST 27 03/20/2024    ALKPHOS 69 03/20/2024    BILITOT 0.4 03/20/2024          Assessment/Plan   1. 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 Douglass from dental after finding a new mass in her right neck.   - We discussed excision biopsy consistent with classic follicular lymphoma grade 1-2.   - We discussed we will plan for a CT C/A/P to complete staging.  - If there is more lymphadenopathy than expected we could consider a PET CT to ensure there is not another area that is more FDG avid.  - Recent CBC and CMP unremarkable.   - I do not suspect bone marrow involvement.   - We will plan for a phone visit on 1/25/24 end of day.   - I will discuss at Tumor Board if there is any role for any radiation therapy.   - RTC for phone visit on 1/25/24 with scans on 1/22/24.      1/25/24: Telephone call     - given some LAD below the neck on recent CT 1/22/23  - discussed at  this AM 1/25/24 and recommended PET/CT  - PET/CT in 2 weeks     2/12/24: Telephone call   - discussed office visit to consider treatment given some increased infections rather than watch and wait approach  - would consider weekly rituximab x4      2/22/24:  - Overall, we discussed given she does have bone involvement I do recommend treatment.   - We discussed she would be considered stage 4 with her lymphoma and she is having symptoms with bone pain.  - We discussed given she has been minimally asymptomatic we could start with weekly rituximab rather than bendamustine/rituximab as she is very nervous about side effects from traditional chemo. We reviewed side effects and consent signed. We will plan to start Tuesday.  - Labs completed yesterday. Hepatitis B core antibody nonreactive.    - We will plan for a PET CT 7 weeks after her last cycle and follow-up with me 8 weeks post treatment for consideration of maintenance therapy and ensuring response versus addition of chemotherapy.  - RTC with me 8 weeks post.    3/20/2024:   - Presents for 4th and final cycle of weekly Rituxan  - Had reaction with cycle 1  - Had sore throat earlier in the week but urgent  care swabbed for multiple viruses which came back negative, patient reports significant improvement of symptoms with allergy medications  - Since she is feeling better and had negative testing and denies fevers/chills/significant fatigue or appetite loss I do feel she can proceed with treatment today if labs are stable and patient agrees with plan as she does not want to delay   - Labs reviewed and stable, patient went on to get treatment as planned   - She is already scheduled to RTC in 8 weeks to review PET scan scheduled in 7 weeks     There are no diagnoses linked to this encounter.      Michael Bejarano, APRN-CNP

## 2024-03-20 ENCOUNTER — OFFICE VISIT (OUTPATIENT)
Dept: HEMATOLOGY/ONCOLOGY | Facility: CLINIC | Age: 69
End: 2024-03-20
Payer: MEDICARE

## 2024-03-20 ENCOUNTER — INFUSION (OUTPATIENT)
Dept: HEMATOLOGY/ONCOLOGY | Facility: CLINIC | Age: 69
End: 2024-03-20
Payer: MEDICARE

## 2024-03-20 VITALS
TEMPERATURE: 99 F | HEART RATE: 62 BPM | DIASTOLIC BLOOD PRESSURE: 73 MMHG | OXYGEN SATURATION: 95 % | RESPIRATION RATE: 16 BRPM | SYSTOLIC BLOOD PRESSURE: 122 MMHG

## 2024-03-20 VITALS
WEIGHT: 151.46 LBS | RESPIRATION RATE: 16 BRPM | BODY MASS INDEX: 27.7 KG/M2 | TEMPERATURE: 97.2 F | SYSTOLIC BLOOD PRESSURE: 130 MMHG | HEART RATE: 67 BPM | DIASTOLIC BLOOD PRESSURE: 81 MMHG | OXYGEN SATURATION: 99 %

## 2024-03-20 DIAGNOSIS — C82.08 GRADE 1 FOLLICULAR LYMPHOMA OF LYMPH NODES OF MULTIPLE REGIONS (MULTI): Primary | ICD-10-CM

## 2024-03-20 DIAGNOSIS — C82.08 GRADE 1 FOLLICULAR LYMPHOMA OF LYMPH NODES OF MULTIPLE REGIONS (MULTI): ICD-10-CM

## 2024-03-20 DIAGNOSIS — C82.01 GRADE 1 FOLLICULAR LYMPHOMA OF LYMPH NODES OF NECK (MULTI): ICD-10-CM

## 2024-03-20 LAB
ALBUMIN SERPL BCP-MCNC: 4.6 G/DL (ref 3.4–5)
ALP SERPL-CCNC: 69 U/L (ref 33–136)
ALT SERPL W P-5'-P-CCNC: 15 U/L (ref 7–45)
ANION GAP SERPL CALC-SCNC: 12 MMOL/L (ref 10–20)
AST SERPL W P-5'-P-CCNC: 27 U/L (ref 9–39)
BASOPHILS # BLD AUTO: 0.04 X10*3/UL (ref 0–0.1)
BASOPHILS NFR BLD AUTO: 0.5 %
BILIRUB SERPL-MCNC: 0.4 MG/DL (ref 0–1.2)
BUN SERPL-MCNC: 21 MG/DL (ref 6–23)
CALCIUM SERPL-MCNC: 9.8 MG/DL (ref 8.6–10.3)
CHLORIDE SERPL-SCNC: 104 MMOL/L (ref 98–107)
CO2 SERPL-SCNC: 28 MMOL/L (ref 21–32)
CREAT SERPL-MCNC: 0.65 MG/DL (ref 0.5–1.05)
EGFRCR SERPLBLD CKD-EPI 2021: >90 ML/MIN/1.73M*2
EOSINOPHIL # BLD AUTO: 0.18 X10*3/UL (ref 0–0.7)
EOSINOPHIL NFR BLD AUTO: 2 %
ERYTHROCYTE [DISTWIDTH] IN BLOOD BY AUTOMATED COUNT: 13.6 % (ref 11.5–14.5)
GLUCOSE SERPL-MCNC: 91 MG/DL (ref 74–99)
HCT VFR BLD AUTO: 45.9 % (ref 36–46)
HGB BLD-MCNC: 14.3 G/DL (ref 12–16)
IMM GRANULOCYTES # BLD AUTO: 0.01 X10*3/UL (ref 0–0.7)
IMM GRANULOCYTES NFR BLD AUTO: 0.1 % (ref 0–0.9)
LDH SERPL L TO P-CCNC: 253 U/L (ref 84–246)
LYMPHOCYTES # BLD AUTO: 1.12 X10*3/UL (ref 1.2–4.8)
LYMPHOCYTES NFR BLD AUTO: 12.6 %
MCH RBC QN AUTO: 26.9 PG (ref 26–34)
MCHC RBC AUTO-ENTMCNC: 31.2 G/DL (ref 32–36)
MCV RBC AUTO: 86 FL (ref 80–100)
MONOCYTES # BLD AUTO: 0.64 X10*3/UL (ref 0.1–1)
MONOCYTES NFR BLD AUTO: 7.2 %
NEUTROPHILS # BLD AUTO: 6.88 X10*3/UL (ref 1.2–7.7)
NEUTROPHILS NFR BLD AUTO: 77.6 %
PLATELET # BLD AUTO: 250 X10*3/UL (ref 150–450)
POTASSIUM SERPL-SCNC: 4.8 MMOL/L (ref 3.5–5.3)
PROT SERPL-MCNC: 7.4 G/DL (ref 6.4–8.2)
RBC # BLD AUTO: 5.32 X10*6/UL (ref 4–5.2)
SODIUM SERPL-SCNC: 139 MMOL/L (ref 136–145)
URATE SERPL-MCNC: 3.7 MG/DL (ref 2.3–6.7)
WBC # BLD AUTO: 8.9 X10*3/UL (ref 4.4–11.3)

## 2024-03-20 PROCEDURE — 85025 COMPLETE CBC W/AUTO DIFF WBC: CPT

## 2024-03-20 PROCEDURE — 96415 CHEMO IV INFUSION ADDL HR: CPT

## 2024-03-20 PROCEDURE — 1160F RVW MEDS BY RX/DR IN RCRD: CPT

## 2024-03-20 PROCEDURE — 83615 LACTATE (LD) (LDH) ENZYME: CPT

## 2024-03-20 PROCEDURE — 2500000004 HC RX 250 GENERAL PHARMACY W/ HCPCS (ALT 636 FOR OP/ED): Performed by: INTERNAL MEDICINE

## 2024-03-20 PROCEDURE — 99214 OFFICE O/P EST MOD 30 MIN: CPT

## 2024-03-20 PROCEDURE — 1159F MED LIST DOCD IN RCRD: CPT

## 2024-03-20 PROCEDURE — 1126F AMNT PAIN NOTED NONE PRSNT: CPT

## 2024-03-20 PROCEDURE — 1157F ADVNC CARE PLAN IN RCRD: CPT

## 2024-03-20 PROCEDURE — 84075 ASSAY ALKALINE PHOSPHATASE: CPT

## 2024-03-20 PROCEDURE — 96413 CHEMO IV INFUSION 1 HR: CPT

## 2024-03-20 PROCEDURE — 84550 ASSAY OF BLOOD/URIC ACID: CPT

## 2024-03-20 PROCEDURE — 96375 TX/PRO/DX INJ NEW DRUG ADDON: CPT | Mod: INF

## 2024-03-20 RX ORDER — DIPHENHYDRAMINE HYDROCHLORIDE 50 MG/ML
50 INJECTION INTRAMUSCULAR; INTRAVENOUS AS NEEDED
Status: DISCONTINUED | OUTPATIENT
Start: 2024-03-20 | End: 2024-03-20 | Stop reason: HOSPADM

## 2024-03-20 RX ORDER — FAMOTIDINE 10 MG/ML
20 INJECTION INTRAVENOUS ONCE AS NEEDED
Status: DISCONTINUED | OUTPATIENT
Start: 2024-03-20 | End: 2024-03-20 | Stop reason: HOSPADM

## 2024-03-20 RX ORDER — PROCHLORPERAZINE EDISYLATE 5 MG/ML
10 INJECTION INTRAMUSCULAR; INTRAVENOUS EVERY 6 HOURS PRN
Status: DISCONTINUED | OUTPATIENT
Start: 2024-03-20 | End: 2024-03-20 | Stop reason: HOSPADM

## 2024-03-20 RX ORDER — FAMOTIDINE 10 MG/ML
40 INJECTION INTRAVENOUS ONCE
Status: COMPLETED | OUTPATIENT
Start: 2024-03-20 | End: 2024-03-20

## 2024-03-20 RX ORDER — ONDANSETRON HYDROCHLORIDE 2 MG/ML
4 INJECTION, SOLUTION INTRAVENOUS ONCE
Status: COMPLETED | OUTPATIENT
Start: 2024-03-20 | End: 2024-03-20

## 2024-03-20 RX ORDER — HEPARIN 100 UNIT/ML
500 SYRINGE INTRAVENOUS AS NEEDED
Status: CANCELLED | OUTPATIENT
Start: 2024-03-20

## 2024-03-20 RX ORDER — ACETAMINOPHEN 325 MG/1
650 TABLET ORAL ONCE
Status: COMPLETED | OUTPATIENT
Start: 2024-03-20 | End: 2024-03-20

## 2024-03-20 RX ORDER — ALBUTEROL SULFATE 0.83 MG/ML
3 SOLUTION RESPIRATORY (INHALATION) AS NEEDED
Status: DISCONTINUED | OUTPATIENT
Start: 2024-03-20 | End: 2024-03-20 | Stop reason: HOSPADM

## 2024-03-20 RX ORDER — EPINEPHRINE 0.3 MG/.3ML
0.3 INJECTION SUBCUTANEOUS EVERY 5 MIN PRN
Status: DISCONTINUED | OUTPATIENT
Start: 2024-03-20 | End: 2024-03-20 | Stop reason: HOSPADM

## 2024-03-20 RX ORDER — HEPARIN SODIUM,PORCINE/PF 10 UNIT/ML
50 SYRINGE (ML) INTRAVENOUS AS NEEDED
Status: CANCELLED | OUTPATIENT
Start: 2024-03-20

## 2024-03-20 RX ORDER — PROCHLORPERAZINE MALEATE 10 MG
10 TABLET ORAL EVERY 6 HOURS PRN
Status: DISCONTINUED | OUTPATIENT
Start: 2024-03-20 | End: 2024-03-20 | Stop reason: HOSPADM

## 2024-03-20 RX ADMIN — FAMOTIDINE 40 MG: 10 INJECTION INTRAVENOUS at 11:07

## 2024-03-20 RX ADMIN — ACETAMINOPHEN 650 MG: 325 TABLET ORAL at 11:06

## 2024-03-20 RX ADMIN — SODIUM CHLORIDE 660 MG: 9 INJECTION, SOLUTION INTRAVENOUS at 12:31

## 2024-03-20 RX ADMIN — HYDROCORTISONE SODIUM SUCCINATE 100 MG: 100 INJECTION, POWDER, FOR SOLUTION INTRAMUSCULAR; INTRAVENOUS at 11:19

## 2024-03-20 RX ADMIN — ONDANSETRON 4 MG: 2 INJECTION INTRAMUSCULAR; INTRAVENOUS at 11:06

## 2024-03-20 RX ADMIN — DEXAMETHASONE SODIUM PHOSPHATE 16 MG: 10 INJECTION, SOLUTION INTRAMUSCULAR; INTRAVENOUS at 11:46

## 2024-03-20 RX ADMIN — DIPHENHYDRAMINE HYDROCHLORIDE 50 MG: 50 INJECTION, SOLUTION INTRAMUSCULAR; INTRAVENOUS at 11:23

## 2024-03-20 ASSESSMENT — PAIN SCALES - GENERAL: PAINLEVEL: 0-NO PAIN

## 2024-03-21 ENCOUNTER — APPOINTMENT (OUTPATIENT)
Dept: PRIMARY CARE | Facility: CLINIC | Age: 69
End: 2024-03-21
Payer: MEDICARE

## 2024-04-04 ENCOUNTER — ALLIED HEALTH (OUTPATIENT)
Dept: INTEGRATIVE MEDICINE | Facility: CLINIC | Age: 69
End: 2024-04-04
Payer: MEDICARE

## 2024-04-04 DIAGNOSIS — M53.3 SACRAL BACK PAIN: ICD-10-CM

## 2024-04-04 DIAGNOSIS — M99.05 SEGMENTAL AND SOMATIC DYSFUNCTION OF PELVIC REGION: ICD-10-CM

## 2024-04-04 DIAGNOSIS — M99.01 SEGMENTAL AND SOMATIC DYSFUNCTION OF CERVICAL REGION: ICD-10-CM

## 2024-04-04 DIAGNOSIS — M99.02 SEGMENTAL AND SOMATIC DYSFUNCTION OF THORACIC REGION: ICD-10-CM

## 2024-04-04 DIAGNOSIS — M99.04 SEGMENTAL AND SOMATIC DYSFUNCTION OF SACRAL REGION: ICD-10-CM

## 2024-04-04 DIAGNOSIS — M99.03 SEGMENTAL AND SOMATIC DYSFUNCTION OF LUMBAR REGION: ICD-10-CM

## 2024-04-04 DIAGNOSIS — M99.00 SEGMENTAL AND SOMATIC DYSFUNCTION OF HEAD REGION: Primary | ICD-10-CM

## 2024-04-04 DIAGNOSIS — M54.2 NECK PAIN: ICD-10-CM

## 2024-04-04 DIAGNOSIS — M47.812 CERVICAL SPONDYLOSIS WITHOUT MYELOPATHY: ICD-10-CM

## 2024-04-04 DIAGNOSIS — M47.816 LUMBAR SPONDYLOSIS: ICD-10-CM

## 2024-04-04 PROCEDURE — 98942 CHIROPRACTIC MANJ 5 REGIONS: CPT | Performed by: CHIROPRACTOR

## 2024-04-04 ASSESSMENT — ENCOUNTER SYMPTOMS
DIFFICULTY URINATING: 0
ALLERGIC/IMMUNOLOGIC COMMENTS: +SEASONAL ALLERGIES
CONFUSION: 0
SHORTNESS OF BREATH: 0
FACIAL ASYMMETRY: 0
CHILLS: 0
FEVER: 0
AGITATION: 0
NAUSEA: 0
ABDOMINAL PAIN: 0

## 2024-04-04 NOTE — PROGRESS NOTES
"Subjective   Patient ID: Yoana Herzog is a 69 y.o. female who presents today for the treatment of pain involving their left sided neck and right sided hip/sacrum pain.    This is visit 3 of the 2024 calendar year. Medicare.    Fall risk: None. No falls in the last 6 months.     HPI - 5/13/24 PET scan to see how the infusions are working. 4 infusions so far for her Grade 1 follicular lymphoma.  Overall she is doing quite well.  She has been tolerating the more recent infusions better.  She is hopeful that she will be in remission when they do the updated scan in May.    Till then she like to continue with chiropractic care performing the same treatment modalities which have proven beneficial in the past.  She would like to focus care today on the left trapezius and neck area as well as the right lumbosacral and sacroiliac joint regions.    Last treatment 3/5/24: after her recent PET scan they decided to go ahead and start treating the lymphoma.  It was staged at 4.  She will be receiving infusions weekly until follow-up PET scan is performed to determine further course of treatment.  She is only had 1 infusion so far and it was pretty intense.  She ended up having a reaction and ended up going to the ER at [facility] that evening due to tightness in the throat.  There is some involvement with her lymphoma into the left clavicle area and under the left breast and she was told that she can continue with chiropractic care \"at her own risk\".  She and I discussed this and she would like to continue with the treatments because it does provide her significant pain relief.    2/6/24: since her last treatment encounter she did go to [place] with her family for a trip.  She did well but did have some aching pain in the lower back after all of the walking.  The neck held up better than she had expected.  We will focus treatment on the lower back hips and sacrum today but check neck and upper back as well as this is typically " an area of tightness and stress for her.  Overall she is doing well no acute exacerbations.    1/10/24: she presents today with the same chief complaint of left greater than right trapezius and neck pain, and right greater than left sacral/lower back pains.  Nothing is acutely exacerbated, however she is anticipating a trip to [place] with her family in about a week and a half.  She wants to make sure she can move and hitting with her children and grandchildren for this trip.  No new symptoms.    Additional medical information: R lymph node removed 12/20/23, Dx with follicular lymphoma. Had right submandibular mass that was found by her dentist in September 2023. Following with Oncology/Hematology Dr. Stevenson.      11/28/23: no acute pain issues today, however the left neck has been bothering her more and the right sacrum has been a little more stiff than usual.  She has been participating in the [employer] for [employer] and has recently finished decorating her home for [employer].  She also mentions that she is recovering from a head cold which has contributed to some lack of energy and physical movement over the last week.    10/23/23: Yoana presents today with the same complaints of left sided neck and right side hip/sacrum pain. She has been doing well since our last encounter as she would like to focus treatment on the same regions as it has been beneficial for her. 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 No additional injuries or changes in her medical history.    9/26/23:  She would like to continue to focus treatment on the left greater than right upper trapezius and neck regions. 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 Therefore she has been doing a lot of bending lifting and reaching. She has not been doing this consecutive days so she has days in between for recovery and rest. She also continues to have soreness and tightness in  the right sacrum which is another chronic area that has regular exacerbations and flareups.      INITIAL INTAKE/SUBJECTIVE 12/12/22: She has seen chiropractic in the past. Her last adjustment was about 5 weeks ago. Is not that she is dissatisfied with the care she has been given, but she is hoping that our treatments will help her completely resolve the issue to the point where she does not need regular care.     Today her primary complaint is left neck and shoulder pain. 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 She also enjoys sewing and notices this pain does get worse after she has been doing a lot of sewing. She will occasionally feel tingling into the left hand most notably in the #3 through 5 fingers. She does have some headaches but these are localized to the suboccipital region and do not radiate into the forehead eyes or temples. In the past her physical therapist and chiropractors have done manual stretching of the left shoulder and done adjustments which have helped.     Her secondary pain complaints are in the right hip glued and sacral pains. She does sleep with a pillow between the knees. She is unsure why but the last 6 to 12 months this has been getting progressively worse. She will occasionally have pain in the hamstring region but this is rare. She will occasionally feel sharp intense pain in the hip or the lower back but this only typically lasts a few seconds. Generally is more of a dull aching pain. The point of max tenderness is localized to the middle of the right butt cheek. No one has worked on this or massage it in the past.    Review of Systems   Constitutional:  Negative for chills and fever.   HENT:  Negative for congestion and sneezing.    Eyes:  Negative for visual disturbance.   Respiratory:  Negative for shortness of breath.    Cardiovascular:  Negative for chest pain.   Gastrointestinal:   Negative for abdominal pain and nausea.   Endocrine: Negative for polyuria.        R cervical lymph node removed 12/20/23, Dx with follicular lymphoma- Receiving immunotherapy via infusions.    Genitourinary:  Negative for difficulty urinating.        2004 Hysterectomy   Musculoskeletal:         +Osteopenia, BL knee surgeries 1960/1970.   Skin:         +Rosacea    Allergic/Immunologic: Positive for environmental allergies.        +seasonal allergies   Neurological:  Negative for facial asymmetry.   Psychiatric/Behavioral:  Negative for agitation and confusion.      Objective   Observation : normal gait and forward head posture    Physical Exam  Examination findings (palpation & ROM): Tenderness palpated over the UT and levator scapulae on the left. Hypertonicity palpated over the lumbar paraspinals, right>left piriformis, right>left SIJ, right> left gluteal regions.    Segmental joint dysfunction was identified in the following areas using motion palpation and/or pain provocation assessment:  Cervical: C0-C3 (Supine)   Thoracic: T4-T9 (Prone)  Lumbar: L3-L5 (Scoop)  Sacrum: L5/S1 and right SI (Drop)    Today's treatment:  Performed spinal manipulation to the regions of segmental dysfunction identified on examination using age-appropriate and injury specific force, and manual diversified technique. Technique Used: diversified CMT, pelvic drop table technique and manual traction.      Brief Seated IASTM and supine MFR to the L>R neck, upper trapezius and L shoulder region. Prone MFR, IC to the lumbar paraspinals, upper gluteals, SIJ, piriformis.         Treatment Plan:   The patient and I discussed the risks and benefits of chiropractic care. Based on the patient's subjective complaints along with the examination findings, it is advised that a course of chiropractic treatment by initiated. Consent for care was given both written and orally by the patient. The patient tolerated today's treatment with little or no  additional discomfort and was instructed to contact the office for questions or concerns.     Treatment Frequency: Will see/treat patient every 2-4 weeks as therapeutic benefit is sustained, then frequency will be reduced to as needed for symptom management or as needed for acute flare-ups/exacerbation.     Please note: Voice-to-text software was used when completing this note.  While the note was proofread, portions may include grammatical errors.  Please contact me with any questions/concerns as it relates to these types of errors.

## 2024-04-08 ENCOUNTER — APPOINTMENT (OUTPATIENT)
Dept: PRIMARY CARE | Facility: CLINIC | Age: 69
End: 2024-04-08
Payer: MEDICARE

## 2024-04-09 ENCOUNTER — OFFICE VISIT (OUTPATIENT)
Dept: PRIMARY CARE | Facility: CLINIC | Age: 69
End: 2024-04-09
Payer: MEDICARE

## 2024-04-09 VITALS
HEART RATE: 63 BPM | TEMPERATURE: 97.7 F | BODY MASS INDEX: 28.19 KG/M2 | RESPIRATION RATE: 16 BRPM | DIASTOLIC BLOOD PRESSURE: 62 MMHG | WEIGHT: 153.2 LBS | SYSTOLIC BLOOD PRESSURE: 120 MMHG | OXYGEN SATURATION: 97 % | HEIGHT: 62 IN

## 2024-04-09 DIAGNOSIS — E78.5 DYSLIPIDEMIA: ICD-10-CM

## 2024-04-09 DIAGNOSIS — C82.08 GRADE 1 FOLLICULAR LYMPHOMA OF LYMPH NODES OF MULTIPLE REGIONS (MULTI): ICD-10-CM

## 2024-04-09 DIAGNOSIS — Z12.31 ENCOUNTER FOR SCREENING MAMMOGRAM FOR MALIGNANT NEOPLASM OF BREAST: ICD-10-CM

## 2024-04-09 DIAGNOSIS — Z00.00 MEDICARE ANNUAL WELLNESS VISIT, SUBSEQUENT: Primary | ICD-10-CM

## 2024-04-09 DIAGNOSIS — C82.80: ICD-10-CM

## 2024-04-09 PROBLEM — M19.93 SECONDARY LOCALIZED OSTEOARTHROSIS: Status: ACTIVE | Noted: 2017-04-28

## 2024-04-09 PROBLEM — R53.83 FATIGUE: Status: ACTIVE | Noted: 2023-12-28

## 2024-04-09 PROBLEM — G89.18 ACUTE POSTOPERATIVE PAIN: Status: ACTIVE | Noted: 2024-04-09

## 2024-04-09 PROBLEM — M25.519 SHOULDER PAIN: Status: ACTIVE | Noted: 2024-04-09

## 2024-04-09 PROBLEM — K11.8: Status: ACTIVE | Noted: 2023-11-06

## 2024-04-09 PROCEDURE — 1123F ACP DISCUSS/DSCN MKR DOCD: CPT | Performed by: INTERNAL MEDICINE

## 2024-04-09 PROCEDURE — 1158F ADVNC CARE PLAN TLK DOCD: CPT | Performed by: INTERNAL MEDICINE

## 2024-04-09 PROCEDURE — 1157F ADVNC CARE PLAN IN RCRD: CPT | Performed by: INTERNAL MEDICINE

## 2024-04-09 PROCEDURE — 1160F RVW MEDS BY RX/DR IN RCRD: CPT | Performed by: INTERNAL MEDICINE

## 2024-04-09 PROCEDURE — 1170F FXNL STATUS ASSESSED: CPT | Performed by: INTERNAL MEDICINE

## 2024-04-09 PROCEDURE — G0439 PPPS, SUBSEQ VISIT: HCPCS | Performed by: INTERNAL MEDICINE

## 2024-04-09 PROCEDURE — 99214 OFFICE O/P EST MOD 30 MIN: CPT | Performed by: INTERNAL MEDICINE

## 2024-04-09 PROCEDURE — 1159F MED LIST DOCD IN RCRD: CPT | Performed by: INTERNAL MEDICINE

## 2024-04-09 ASSESSMENT — ENCOUNTER SYMPTOMS
NAUSEA: 0
SLEEP DISTURBANCE: 0
WEAKNESS: 0
CHEST TIGHTNESS: 0
CONSTIPATION: 0
ARTHRALGIAS: 0
ABDOMINAL PAIN: 0
COUGH: 0
DYSURIA: 0
CONFUSION: 0
BACK PAIN: 1
PALPITATIONS: 0
SHORTNESS OF BREATH: 0
CHILLS: 0
DIARRHEA: 0
JOINT SWELLING: 0
SORE THROAT: 0
UNEXPECTED WEIGHT CHANGE: 0
DIZZINESS: 0
WHEEZING: 0
ADENOPATHY: 0
NECK PAIN: 1
FATIGUE: 1
VOMITING: 0

## 2024-04-09 ASSESSMENT — PATIENT HEALTH QUESTIONNAIRE - PHQ9
1. LITTLE INTEREST OR PLEASURE IN DOING THINGS: NOT AT ALL
2. FEELING DOWN, DEPRESSED OR HOPELESS: NOT AT ALL
SUM OF ALL RESPONSES TO PHQ9 QUESTIONS 1 AND 2: 0

## 2024-04-09 ASSESSMENT — ACTIVITIES OF DAILY LIVING (ADL)
GROCERY_SHOPPING: INDEPENDENT
TAKING_MEDICATION: INDEPENDENT
MANAGING_FINANCES: INDEPENDENT
BATHING: INDEPENDENT
DOING_HOUSEWORK: INDEPENDENT
DRESSING: INDEPENDENT

## 2024-04-09 NOTE — PATIENT INSTRUCTIONS
Was nice seeing you today.  Continue same medication.  Have lab work done before next appointment if labs were ordered today.  Fu in 6 month.  Call/ contact our office with any concerns.    If you have labs or test done and you can't see the report in your chart or you didn't here from us in 2 weeks after test/labs done , please, call our office for reports.  Please , do not assume that they were normal.    
no

## 2024-04-09 NOTE — PROGRESS NOTES
Subjective   Reason for Visit: Yoana Herzog is an 69 y.o. female here for a Medicare Wellness visit.     Past Medical, Surgical, and Family History reviewed and updated in chart.    Reviewed all medications by prescribing practitioner or clinical pharmacist (such as prescriptions, OTCs, herbal therapies and supplements) and documented in the medical record.    AWV  Labs - 1.2024  Labs - 3.2024- ordered by dr Eaton  Colonoscopy-10.11.2022-Q1y ,patient states she had colonoscopy  in 10.31.2023 at [facility] and was told to repeat in 3 y   Dexa and mammogram -9.11.2023  Smoking -never   Patient states  seen by ent for submandibular  nodule , was dx with follicular lymphoma and had sx 2024.  She was seen by oncology, had a pet scan and was seen in other places, . Had immunotherapy done , has a PET scan scheduled for may 13.2024, then f/u with oncologist  dr Stevenson-5.16.24, and  another [place] infusion scheduled for the same day   All notes rev .        Patient Care Team:  Valdez Cooper MD as PCP - General (Internal Medicine)  Valdez Cooper MD as PCP - [employer] Medicare Advantage PCP  Uma Stevenson MD as Consulting Physician (Hematology and Oncology)  DEMOND Goins as  (Oncology)     Review of Systems   Constitutional:  Positive for fatigue. Negative for chills and unexpected weight change.        Comment   HENT:  Negative for congestion, ear pain and sore throat.    Respiratory:  Negative for cough, chest tightness, shortness of breath and wheezing.    Cardiovascular:  Negative for palpitations and leg swelling.   Gastrointestinal:  Negative for abdominal pain, constipation, diarrhea, nausea and vomiting.   Genitourinary:  Negative for dysuria and urgency.   Musculoskeletal:  Positive for back pain and neck pain. Negative for arthralgias and joint swelling.   Skin:  Negative for rash.   Neurological:  Negative for dizziness and weakness.   Hematological:  Negative for  "adenopathy.   Psychiatric/Behavioral:  Negative for confusion and sleep disturbance.    All other systems reviewed and are negative.      Objective   Vitals:  /62 (BP Location: Left arm, Patient Position: Sitting)   Pulse 63   Temp 36.5 °C (97.7 °F)   Resp 16   Ht 1.575 m (5' 2\")   Wt 69.5 kg (153 lb 3.2 oz)   SpO2 97%   BMI 28.02 kg/m²       Physical Exam  Constitutional:       Appearance: Normal appearance.   HENT:      Head: Normocephalic and atraumatic.   Eyes:      Pupils: Pupils are equal, round, and reactive to light.   Cardiovascular:      Rate and Rhythm: Normal rate and regular rhythm.   Pulmonary:      Effort: Pulmonary effort is normal.      Breath sounds: Normal breath sounds.   Musculoskeletal:         General: Normal range of motion.      Cervical back: Normal range of motion and neck supple.   Skin:     General: Skin is warm.   Neurological:      General: No focal deficit present.      Mental Status: She is alert and oriented to person, place, and time.   Psychiatric:         Mood and Affect: Mood normal.         Behavior: Behavior normal.       Lab Results   Component Value Date    GLUCOSE 91 03/20/2024    CALCIUM 9.8 03/20/2024     03/20/2024    K 4.8 03/20/2024    CO2 28 03/20/2024     03/20/2024    BUN 21 03/20/2024    CREATININE 0.65 03/20/2024     Lab Results   Component Value Date    WBC 8.9 03/20/2024    HGB 14.3 03/20/2024    HCT 45.9 03/20/2024    MCV 86 03/20/2024     03/20/2024     Lab Results   Component Value Date    ALT 15 03/20/2024    AST 27 03/20/2024    ALKPHOS 69 03/20/2024    BILITOT 0.4 03/20/2024           Assessment/Plan   Problem List Items Addressed This Visit       Dyslipidemia    Current Assessment & Plan     I have discussed the cardiovascular risk and behavioral modification, nutrition, exercise and elimination of habits contributing to risk. We agreed to a plan how to reduce the risk.  CV risk estimate calculates 8.3%  Will need tx, will " recheck  levels.  Is on tx for lymphoma now , will hold on on statin           Grade 1 follicular lymphoma of lymph nodes of multiple regions (CMS/HCC)    Malignant lymphoma, small cleaved cell, follicular (CMS/HCC)    Medicare annual wellness visit, subsequent - Primary     Other Visit Diagnoses       Encounter for screening mammogram for malignant neoplasm of breast

## 2024-04-09 NOTE — ASSESSMENT & PLAN NOTE
I have discussed the cardiovascular risk and behavioral modification, nutrition, exercise and elimination of habits contributing to risk. We agreed to a plan how to reduce the risk.  CV risk estimate calculates 8.3%  Will need tx, will recheck  levels.  Is on tx for lymphoma now , will hold on on statin

## 2024-04-23 ENCOUNTER — TELEPHONE (OUTPATIENT)
Dept: HEMATOLOGY/ONCOLOGY | Facility: CLINIC | Age: 69
End: 2024-04-23

## 2024-04-23 NOTE — TELEPHONE ENCOUNTER
Experiencing discomfort in abdomen that started a few weeks ago, but today has been constant.  Was intermittent prior to this.  Not sure where to start.  3/20 was immunotherapy.

## 2024-04-23 NOTE — TELEPHONE ENCOUNTER
I spoke with Yoana, she states that she has been having achy/pressure like pelvic pain for the last few weeks intermittent that has now turned to constant pain at a 3 or 4 out of 10 on the pain scale. She is currently not complaining of nausea, bloating, vomiting, constipation or diarrhea. She states that she has a good appetite and is eating well. She denies urinary symptoms including: retention, burning, frequency or urine color changes. She states that the area is soft to touch. She denies fevers. I did notify her that she could either go to urgent care for her symptoms or give her PCP a call to be evaluated, but I did notify her that she needs to be evaluated within the next couple of days. She states that she will give her PCP a call tomorrow and call the clinic if anything further is needed. She was appreciative of the call and had no other questions at this time.

## 2024-05-03 ENCOUNTER — OFFICE VISIT (OUTPATIENT)
Dept: PRIMARY CARE | Facility: CLINIC | Age: 69
End: 2024-05-03
Payer: MEDICARE

## 2024-05-03 VITALS
HEART RATE: 63 BPM | SYSTOLIC BLOOD PRESSURE: 110 MMHG | RESPIRATION RATE: 16 BRPM | OXYGEN SATURATION: 97 % | DIASTOLIC BLOOD PRESSURE: 68 MMHG | HEIGHT: 62 IN | TEMPERATURE: 97.3 F | WEIGHT: 151.4 LBS | BODY MASS INDEX: 27.86 KG/M2

## 2024-05-03 DIAGNOSIS — R10.2 HYPOGASTRIC PAIN: ICD-10-CM

## 2024-05-03 DIAGNOSIS — R35.0 FREQUENCY OF URINATION: Primary | ICD-10-CM

## 2024-05-03 LAB
POC APPEARANCE, URINE: CLEAR
POC BILIRUBIN, URINE: NEGATIVE
POC BLOOD, URINE: NEGATIVE
POC COLOR, URINE: YELLOW
POC GLUCOSE, URINE: NEGATIVE MG/DL
POC KETONES, URINE: NEGATIVE MG/DL
POC LEUKOCYTES, URINE: NEGATIVE
POC NITRITE,URINE: NEGATIVE
POC PH, URINE: 8.5 PH
POC PROTEIN, URINE: ABNORMAL MG/DL
POC SPECIFIC GRAVITY, URINE: <=1.005
POC UROBILINOGEN, URINE: 0.2 EU/DL

## 2024-05-03 PROCEDURE — 99213 OFFICE O/P EST LOW 20 MIN: CPT | Performed by: INTERNAL MEDICINE

## 2024-05-03 PROCEDURE — 1157F ADVNC CARE PLAN IN RCRD: CPT | Performed by: INTERNAL MEDICINE

## 2024-05-03 PROCEDURE — 1160F RVW MEDS BY RX/DR IN RCRD: CPT | Performed by: INTERNAL MEDICINE

## 2024-05-03 PROCEDURE — 87086 URINE CULTURE/COLONY COUNT: CPT

## 2024-05-03 PROCEDURE — 81003 URINALYSIS AUTO W/O SCOPE: CPT | Performed by: INTERNAL MEDICINE

## 2024-05-03 PROCEDURE — 1159F MED LIST DOCD IN RCRD: CPT | Performed by: INTERNAL MEDICINE

## 2024-05-03 PROCEDURE — 1123F ACP DISCUSS/DSCN MKR DOCD: CPT | Performed by: INTERNAL MEDICINE

## 2024-05-03 RX ORDER — PHENAZOPYRIDINE HYDROCHLORIDE 100 MG/1
200 TABLET, FILM COATED ORAL 3 TIMES DAILY PRN
Qty: 6 TABLET | Refills: 3 | Status: SHIPPED | OUTPATIENT
Start: 2024-05-03 | End: 2024-06-02

## 2024-05-03 RX ORDER — CEPHALEXIN 500 MG/1
500 CAPSULE ORAL 2 TIMES DAILY
Qty: 14 CAPSULE | Refills: 0 | Status: SHIPPED | OUTPATIENT
Start: 2024-05-03 | End: 2024-05-10

## 2024-05-03 ASSESSMENT — ENCOUNTER SYMPTOMS
CHILLS: 0
NAUSEA: 0
FLANK PAIN: 0
VOMITING: 0
FREQUENCY: 1
HEMATURIA: 0

## 2024-05-03 NOTE — PATIENT INSTRUCTIONS
Increase po fluids  Take pyridium three times /day for 2 days, if still symptoms start keflex  Fu prn

## 2024-05-03 NOTE — PROGRESS NOTES
"Subjective   Yoana Herzog is a 69 y.o. female who presents for UTI (UTI symptoms ).  Possible UTI-  burning  little, discomfort x couple of weeks now,lower abd pain on/off. Has a burning like  sensation hypogastric area, and now with urinating too.    UTI   This is a new problem. The current episode started 1 to 4 weeks ago. The quality of the pain is described as burning. The pain is at a severity of 3/10. The pain is mild. There has been no fever. Associated symptoms include frequency. Pertinent negatives include no chills, flank pain, hematuria, nausea or vomiting. She has tried nothing for the symptoms.     Review of Systems   Constitutional:  Negative for chills.   Gastrointestinal:  Negative for nausea and vomiting.   Genitourinary:  Positive for frequency. Negative for flank pain and hematuria.       Objective   Physical Exam  Constitutional:       Appearance: Normal appearance.      Comments: Mild tenderness hypogastric area, no flank pain   HENT:      Head: Normocephalic and atraumatic.   Eyes:      Pupils: Pupils are equal, round, and reactive to light.   Cardiovascular:      Rate and Rhythm: Normal rate and regular rhythm.   Pulmonary:      Effort: Pulmonary effort is normal.      Breath sounds: Normal breath sounds.   Musculoskeletal:         General: Normal range of motion.      Cervical back: Normal range of motion and neck supple.   Skin:     General: Skin is warm.   Neurological:      General: No focal deficit present.      Mental Status: She is alert and oriented to person, place, and time.   Psychiatric:         Mood and Affect: Mood normal.         Behavior: Behavior normal.       /68 (BP Location: Left arm, Patient Position: Sitting)   Pulse 63   Temp 36.3 °C (97.3 °F)   Resp 16   Ht 1.575 m (5' 2\")   Wt 68.7 kg (151 lb 6.4 oz)   SpO2 97%   BMI 27.69 kg/m²       Assessment/Plan   Problem List Items Addressed This Visit       Frequency of urination - Primary    Relevant " Medications    phenazopyridine (Pyridium) 100 mg tablet    cephalexin (Keflex) 500 mg capsule    Other Relevant Orders    POCT UA Automated manually resulted (Completed)    Urine Culture    Hypogastric pain

## 2024-05-04 LAB — BACTERIA UR CULT: NORMAL

## 2024-05-06 ENCOUNTER — APPOINTMENT (OUTPATIENT)
Dept: INTEGRATIVE MEDICINE | Facility: CLINIC | Age: 69
End: 2024-05-06
Payer: MEDICARE

## 2024-05-07 ENCOUNTER — ALLIED HEALTH (OUTPATIENT)
Dept: INTEGRATIVE MEDICINE | Facility: CLINIC | Age: 69
End: 2024-05-07
Payer: MEDICARE

## 2024-05-07 DIAGNOSIS — M99.05 SEGMENTAL AND SOMATIC DYSFUNCTION OF PELVIC REGION: ICD-10-CM

## 2024-05-07 DIAGNOSIS — M99.03 SEGMENTAL AND SOMATIC DYSFUNCTION OF LUMBAR REGION: ICD-10-CM

## 2024-05-07 DIAGNOSIS — M54.2 NECK PAIN: ICD-10-CM

## 2024-05-07 DIAGNOSIS — M99.00 SEGMENTAL AND SOMATIC DYSFUNCTION OF HEAD REGION: Primary | ICD-10-CM

## 2024-05-07 DIAGNOSIS — M47.812 CERVICAL SPONDYLOSIS WITHOUT MYELOPATHY: ICD-10-CM

## 2024-05-07 DIAGNOSIS — M99.04 SEGMENTAL AND SOMATIC DYSFUNCTION OF SACRAL REGION: ICD-10-CM

## 2024-05-07 DIAGNOSIS — M99.01 SEGMENTAL AND SOMATIC DYSFUNCTION OF CERVICAL REGION: ICD-10-CM

## 2024-05-07 DIAGNOSIS — M53.3 SACRAL BACK PAIN: ICD-10-CM

## 2024-05-07 DIAGNOSIS — M99.02 SEGMENTAL AND SOMATIC DYSFUNCTION OF THORACIC REGION: ICD-10-CM

## 2024-05-07 DIAGNOSIS — M79.9 POSTURAL STRAIN: ICD-10-CM

## 2024-05-07 DIAGNOSIS — M47.816 LUMBAR SPONDYLOSIS: ICD-10-CM

## 2024-05-07 PROCEDURE — 98941 CHIROPRACT MANJ 3-4 REGIONS: CPT | Performed by: CHIROPRACTOR

## 2024-05-07 ASSESSMENT — ENCOUNTER SYMPTOMS
CONFUSION: 0
AGITATION: 0
ALLERGIC/IMMUNOLOGIC COMMENTS: +SEASONAL ALLERGIES
SHORTNESS OF BREATH: 0
FEVER: 0
DIFFICULTY URINATING: 0
ABDOMINAL PAIN: 0
NAUSEA: 0
CHILLS: 0
FACIAL ASYMMETRY: 0

## 2024-05-07 NOTE — PROGRESS NOTES
"Subjective   Patient ID: Yoana Herzog is a 69 y.o. female who presents today for the treatment of pain involving their left sided neck and right sided hip/sacrum pain.    This is visit 4 of the 2024 calendar year. Medicare.    Fall risk: None. No falls in the last 6 months.     HPI -she is having her updated scans next week.  Overall she is feeling a little bit better.  She would like to continue working on her chronically tight left neck and right lower back.  She has been doing a little more in the garden since her last treatment encounter but is trying to pace herself.    Last treatment 4/4/24: 5/13/24 PET scan to see how the infusions are working. 4 infusions so far for her Grade 1 follicular lymphoma.  Overall she is doing quite well.  She has been tolerating the more recent infusions better.  She is hopeful that she will be in remission when they do the updated scan in May.    Till then she like to continue with chiropractic care performing the same treatment modalities which have proven beneficial in the past.  She would like to focus care today on the left trapezius and neck area as well as the right lumbosacral and sacroiliac joint regions.    3/5/24: after her recent PET scan they decided to go ahead and start treating the lymphoma.  It was staged at 4.  She will be receiving infusions weekly until follow-up PET scan is performed to determine further course of treatment.  She is only had 1 infusion so far and it was pretty intense.  She ended up having a reaction and ended up going to the ER at [facility] that evening due to tightness in the throat.  There is some involvement with her lymphoma into the left clavicle area and under the left breast and she was told that she can continue with chiropractic care \"at her own risk\".  She and I discussed this and she would like to continue with the treatments because it does provide her significant pain relief.    2/6/24: since her last treatment encounter she " did go to [place] with her family for a trip.  She did well but did have some aching pain in the lower back after all of the walking.  The neck held up better than she had expected.  We will focus treatment on the lower back hips and sacrum today but check neck and upper back as well as this is typically an area of tightness and stress for her.  Overall she is doing well no acute exacerbations.    1/10/24: she presents today with the same chief complaint of left greater than right trapezius and neck pain, and right greater than left sacral/lower back pains.  Nothing is acutely exacerbated, however she is anticipating a trip to [place] with her family in about a week and a half.  She wants to make sure she can move and hitting with her children and grandchildren for this trip.  No new symptoms.    Additional medical information: R lymph node removed 12/20/23, Dx with follicular lymphoma. Had right submandibular mass that was found by her dentist in September 2023. Following with Oncology/Hematology Dr. Stevenson.      11/28/23: no acute pain issues today, however the left neck has been bothering her more and the right sacrum has been a little more stiff than usual.  She has been participating in the cooking for [employer] and has recently finished decorating her home for [employer].  She also mentions that she is recovering from a head cold which has contributed to some lack of energy and physical movement over the last week.    10/23/23: Yoana presents today with the same complaints of left sided neck and right side hip/sacrum pain. She has been doing well since our last encounter as she would like to focus treatment on the same regions as it has been beneficial for her. 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 No additional injuries or changes in her medical history.    9/26/23:  She would like to continue to focus treatment on the left greater than right upper trapezius and neck regions. 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 Therefore she has been doing a lot of bending lifting and reaching. She has not been doing this consecutive days so she has days in between for recovery and rest. She also continues to have soreness and tightness in the right sacrum which is another chronic area that has regular exacerbations and flareups.      INITIAL INTAKE/SUBJECTIVE 12/12/22: She has seen chiropractic in the past. Her last adjustment was about 5 weeks ago. Is not that she is dissatisfied with the care she has been given, but she is hoping that our treatments will help her completely resolve the issue to the point where she does not need regular care.     Today her primary complaint is left neck and shoulder pain. 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 She also enjoys sewing and notices this pain does get worse after she has been doing a lot of sewing. She will occasionally feel tingling into the left hand most notably in the #3 through 5 fingers. She does have some headaches but these are localized to the suboccipital region and do not radiate into the forehead eyes or temples. In the past her physical therapist and chiropractors have done manual stretching of the left shoulder and done adjustments which have helped.     Her secondary pain complaints are in the right hip glued and sacral pains. She does sleep with a pillow between the knees. She is unsure why but the last 6 to 12 months this has been getting progressively worse. She will occasionally have pain in the hamstring region but this is rare. She will occasionally feel sharp intense pain in the hip or the lower back but this only typically lasts a few seconds. Generally is more of a dull aching pain. The point of max tenderness is localized to the middle of the right butt cheek. No one has worked on  this or massage it in the past.    Review of Systems   Constitutional:  Negative for chills and fever.   HENT:  Negative for congestion and sneezing.    Eyes:  Negative for visual disturbance.   Respiratory:  Negative for shortness of breath.    Cardiovascular:  Negative for chest pain.   Gastrointestinal:  Negative for abdominal pain and nausea.   Endocrine: Negative for polyuria.        R cervical lymph node removed 12/20/23, Dx with follicular lymphoma- Receiving immunotherapy via infusions.    Genitourinary:  Negative for difficulty urinating.        2004 Hysterectomy   Musculoskeletal:         +Osteopenia, BL knee surgeries 1960/1970.   Skin:         +Rosacea    Allergic/Immunologic: Positive for environmental allergies.        +seasonal allergies   Neurological:  Negative for facial asymmetry.   Psychiatric/Behavioral:  Negative for agitation and confusion.      Objective   Observation : normal gait and forward head posture    Physical Exam  Examination findings (palpation & ROM): Tenderness palpated over the UT and levator scapulae on the left. Hypertonicity palpated over the lumbar paraspinals, right>left piriformis, right>left SIJ, right> left gluteal regions.    Segmental joint dysfunction was identified in the following areas using motion palpation and/or pain provocation assessment:  Cervical: C0-C3 (Supine)   Thoracic: T4-T9 (Prone)  Lumbar: L3-L5 (Scoop)  Sacrum: L5/S1 and right SI (Drop)    Today's treatment:  Performed spinal manipulation to the regions of segmental dysfunction identified on examination using age-appropriate and injury specific force, and manual diversified technique. Technique Used: diversified CMT, pelvic drop table technique and manual traction.      Brief Seated IASTM and supine MFR to the L>R neck, upper trapezius and L shoulder region. Prone MFR, IC to the lumbar paraspinals, upper gluteals, SIJ, piriformis.         Treatment Plan:   The patient and I discussed the risks and  benefits of chiropractic care. Based on the patient's subjective complaints along with the examination findings, it is advised that a course of chiropractic treatment by initiated. Consent for care was given both written and orally by the patient. The patient tolerated today's treatment with little or no additional discomfort and was instructed to contact the office for questions or concerns.     Treatment Frequency: Will see/treat patient every 2-4 weeks as therapeutic benefit is sustained, then frequency will be reduced to as needed for symptom management or as needed for acute flare-ups/exacerbation.     Please note: Voice-to-text software was used when completing this note.  While the note was proofread, portions may include grammatical errors.  Please contact me with any questions/concerns as it relates to these types of errors.

## 2024-05-13 ENCOUNTER — HOSPITAL ENCOUNTER (OUTPATIENT)
Dept: RADIOLOGY | Facility: CLINIC | Age: 69
Discharge: HOME | End: 2024-05-13
Payer: MEDICARE

## 2024-05-13 DIAGNOSIS — C82.08 GRADE 1 FOLLICULAR LYMPHOMA OF LYMPH NODES OF MULTIPLE REGIONS (MULTI): ICD-10-CM

## 2024-05-13 PROCEDURE — 3430000001 HC RX 343 DIAGNOSTIC RADIOPHARMACEUTICALS: Performed by: INTERNAL MEDICINE

## 2024-05-13 PROCEDURE — A9552 F18 FDG: HCPCS | Performed by: INTERNAL MEDICINE

## 2024-05-13 PROCEDURE — 78815 PET IMAGE W/CT SKULL-THIGH: CPT | Mod: PS

## 2024-05-13 PROCEDURE — 78815 PET IMAGE W/CT SKULL-THIGH: CPT | Mod: PET TUMOR SUBSQ TX STRATEGY | Performed by: NUCLEAR MEDICINE

## 2024-05-13 RX ORDER — FLUDEOXYGLUCOSE F 18 200 MCI/ML
11.7 INJECTION, SOLUTION INTRAVENOUS
Status: COMPLETED | OUTPATIENT
Start: 2024-05-13 | End: 2024-05-13

## 2024-05-13 RX ADMIN — FLUDEOXYGLUCOSE F 18 11.7 MILLICURIE: 200 INJECTION, SOLUTION INTRAVENOUS at 08:11

## 2024-05-15 NOTE — PROGRESS NOTES
Patient ID: Yoana Herzog is a 69 y.o. female.  Oncology History   Grade 1 follicular lymphoma of lymph nodes of multiple regions (Multi)   2/22/2024 Initial Diagnosis    Grade 1 follicular lymphoma of lymph nodes of multiple regions (CMS/HCC)     2/28/2024 -  Chemotherapy    RiTUXimab (Weekly), 28 Day Cycle        Subjective    HPI  Ms. Yoana Herzog is a 68 y/o F presenting for follow-up for follicular lymphoma.      Since last visit a PET CT showed interval resolution of previously noted hypermetabolic activity. No other sites of disease.    Patient reports she saw her PCP approximately 2 weeks ago with some urinary symptoms. Both in office and sent out urine test were negative. She was put on a disinfectant and seems to be improving.     She is going on vacation this summer and wonders what forms of sun protection are appropriate for her.     She reports feeling good with no recent infections, fevers, or chills. No worsening cough or SOB. No nausea. No pain or chest pressure. No other major complaints at this time.       Patient's past medical history, surgical history, family history and social history reviewed.  Objective      Vitals:    05/16/24 1011   BP: 137/75   Pulse: 60   Resp: 16   Temp: 36.7 °C (98.1 °F)   SpO2: 98%          Review of Systems:   Review of Systems:    Positive per HPI, otherwise negative.   Physical Exam  Gen: appears well in clinic, NAD  HEENT: atraumatic head, normocephalic, EOMI, conjunctiva normal  LUNG: no increased WOB, CTAB  CV: No JVD. RRR  GI: soft, NT, ND  LE: no LE edema  Skin: no obvious rashes or lesions on visible skin  Neuro: interactive, no focal deficits noted  Psych: normal mood and affect  Performance Status:  Symptomatic; fully ambulatory    Labs/Imaging/Pathology: personally reviewed reports and images in Epic electronic medical record system. Pertinent results as it related to the plan represented in below in assessment and plan.   Assessment/Plan   1.  Follicular lymphoma grade 1-2 in the right submandibular gland, staging pending     - Patient initially presented to Dr. Douglass from dental after finding a new mass in her right neck.   - We discussed excision biopsy consistent with classic follicular lymphoma grade 1-2.   - We discussed we will plan for a CT C/A/P to complete staging.  - If there is more lymphadenopathy than expected we could consider a PET CT to ensure there is not another area that is more FDG avid.  - Recent CBC and CMP unremarkable.   - I do not suspect bone marrow involvement.   - We will plan for a phone visit on 1/25/24 end of day.   - I will discuss at Tumor Board if there is any role for any radiation therapy.   - RTC for phone visit on 1/25/24 with scans on 1/22/24.      1/25/24: Telephone call     - given some LAD below the neck on recent CT 1/22/23  - discussed at  this AM 1/25/24 and recommended PET/CT  - PET/CT in 2 weeks     2/12/24: Telephone call   - discussed office visit to consider treatment given some increased infections rather than watch and wait approach  - would consider weekly rituximab x4      2/22/24:  - Overall, we discussed given she does have bone involvement I do recommend treatment.   - We discussed she would be considered stage 4 with her lymphoma and she is having symptoms with bone pain.  - We discussed given she has been minimally asymptomatic we could start with weekly rituximab rather than bendamustine/rituximab as she is very nervous about side effects from traditional chemo. We reviewed side effects and consent signed. We will plan to start Tuesday.  - Labs completed yesterday. Hepatitis B core antibody nonreactive.    - We will plan for a PET CT 7 weeks after her last cycle and follow-up with me 8 weeks post treatment for consideration of maintenance therapy and ensuring response versus addition of chemotherapy.  - RTC with me 8 weeks post.     3/20/2024:   - Presents for 4th and final cycle of weekly  Rituxan  - Had reaction with cycle 1  - Had sore throat earlier in the week but urgent care swabbed for multiple viruses which came back negative, patient reports significant improvement of symptoms with allergy medications  - Since she is feeling better and had negative testing and denies fevers/chills/significant fatigue or appetite loss I do feel she can proceed with treatment today if labs are stable and patient agrees with plan as she does not want to delay   - Labs reviewed and stable, patient went on to get treatment as planned   - She is already scheduled to RTC in 8 weeks to review PET scan scheduled in 7 weeks     5/16/24:  - No new FGD avid areas shown on most recent PET. Interval resolution of previously noted nodes.   - We discussed the role of maintenance rituximab and patient wishes to proceed.  - Will plan for her 1st dose today and every 2 months from here.  - Follow-up in 2 months for treatment only and in 4 months with me.     Reviewed ongoing medical problems and how they relate to her follicular lymphoma, will continue long term monitoring.    RTC in 2 months for tx only and 4 months with me. This note has been transcribed using a medical scribe and there is a possibility of unintentional typing misprints.       Diagnoses and all orders for this visit:  Grade 1 follicular lymphoma of lymph nodes of multiple regions (Multi)  -     Clinic Appointment Request Follow up; [facility]  -     Infusion Appointment Request; Future  -     Infusion Appointment Request; Future  -     CBC and Auto Differential; Future  -     Comprehensive metabolic panel; Future  -     Lactate dehydrogenase; Future  -     Uric Acid; Future  -     Clinic Appointment Request; Future  -     Infusion Appointment Request; Future  -     CBC and Auto Differential; Future  -     Comprehensive metabolic panel; Future  -     Lactate dehydrogenase; Future  -     Uric Acid; Future  -     Infusion Appointment Request; Future  -      Clinic Appointment Request; Future  -     Infusion Appointment Request; Future  -     CBC and Auto Differential; Future  -     Comprehensive metabolic panel; Future  -     Lactate dehydrogenase; Future  -     Uric Acid; Future  Other orders  -     ondansetron (Zofran) injection 4 mg  -     famotidine PF (Pepcid) injection 40 mg  -     acetaminophen (Tylenol) tablet 650 mg  -     prochlorperazine (Compazine) tablet 10 mg  -     prochlorperazine (Compazine) injection 10 mg  -     sodium chloride 0.9 % bolus 500 mL  -     dextrose 5 % in water (D5W) bolus  -     diphenhydrAMINE (BENADryl) injection 50 mg  -     methylPREDNISolone sod succinate (SOLU-Medrol) 40 mg/mL injection 40 mg  -     famotidine PF (Pepcid) injection 20 mg  -     EPINEPHrine (Epipen) injection syringe 0.3 mg  -     albuterol 2.5 mg /3 mL (0.083 %) nebulizer solution 3 mL  -     ondansetron (Zofran) injection 4 mg  -     famotidine PF (Pepcid) injection 40 mg  -     acetaminophen (Tylenol) tablet 650 mg  -     prochlorperazine (Compazine) tablet 10 mg  -     prochlorperazine (Compazine) injection 10 mg  -     sodium chloride 0.9 % bolus 500 mL  -     dextrose 5 % in water (D5W) bolus  -     diphenhydrAMINE (BENADryl) injection 50 mg  -     methylPREDNISolone sod succinate (SOLU-Medrol) 40 mg/mL injection 40 mg  -     famotidine PF (Pepcid) injection 20 mg  -     EPINEPHrine (Epipen) injection syringe 0.3 mg  -     albuterol 2.5 mg /3 mL (0.083 %) nebulizer solution 3 mL  -     riTUXimab-pvvr (Ruxience) 600 mg in sodium chloride 0.9% 310 mL IV  -     diphenhydrAMINE (BENADryl) 50 mg in sodium chloride 0.9% 50 mL IV  -     dexAMETHasone (Decadron) 16 mg in dextrose 5 % in water (D5W) 50 mL IV  -     riTUXimab-pvvr (Ruxience) 600 mg in sodium chloride 0.9% 310 mL IV  -     diphenhydrAMINE (BENADryl) 50 mg in sodium chloride 0.9% 50 mL IV  -     dexAMETHasone (Decadron) 16 mg in dextrose 5 % in water (D5W) 50 mL IV  -     riTUXimab-pvvr (Ruxience) 600  mg in sodium chloride 0.9% 310 mL IV  -     diphenhydrAMINE (BENADryl) 50 mg in sodium chloride 0.9% 50 mL IV  -     dexAMETHasone (Decadron) 16 mg in dextrose 5 % in water (D5W) 50 mL IV  -     ondansetron (Zofran) injection 4 mg  -     famotidine PF (Pepcid) injection 40 mg  -     acetaminophen (Tylenol) tablet 650 mg  -     prochlorperazine (Compazine) tablet 10 mg  -     prochlorperazine (Compazine) injection 10 mg  -     riTUXimab-pvvr (Ruxience) 600 mg in sodium chloride 0.9% 310 mL IV  -     sodium chloride 0.9 % bolus 500 mL  -     dextrose 5 % in water (D5W) bolus  -     diphenhydrAMINE (BENADryl) injection 50 mg  -     methylPREDNISolone sod succinate (SOLU-Medrol) 40 mg/mL injection 40 mg  -     famotidine PF (Pepcid) injection 20 mg  -     EPINEPHrine (Epipen) injection syringe 0.3 mg  -     albuterol 2.5 mg /3 mL (0.083 %) nebulizer solution 3 mL  Time Spent  Prep time on day of patient encounter: 5 minutes  Time spent directly with patient, family or caregiver: 18 minutes  Additional Time Spent on Patient Care Activities: 5 minutes  Documentation Time: 5 minutes  Other Time Spent: 0 minutes  Total: 33 minutes           Uma Stevenson MD  Hematology/Oncology  [facility]    Scribander Attestation  By signing my name below, IAngela Scribe attest that this documentation has been prepared under the direction and in the presence of Uma Stevenson MD.

## 2024-05-16 ENCOUNTER — OFFICE VISIT (OUTPATIENT)
Dept: HEMATOLOGY/ONCOLOGY | Facility: CLINIC | Age: 69
End: 2024-05-16
Payer: MEDICARE

## 2024-05-16 ENCOUNTER — INFUSION (OUTPATIENT)
Dept: HEMATOLOGY/ONCOLOGY | Facility: CLINIC | Age: 69
End: 2024-05-16
Payer: MEDICARE

## 2024-05-16 VITALS
DIASTOLIC BLOOD PRESSURE: 75 MMHG | OXYGEN SATURATION: 98 % | TEMPERATURE: 98.1 F | SYSTOLIC BLOOD PRESSURE: 137 MMHG | RESPIRATION RATE: 16 BRPM | BODY MASS INDEX: 27.7 KG/M2 | HEART RATE: 60 BPM | WEIGHT: 151.46 LBS

## 2024-05-16 DIAGNOSIS — C82.08 GRADE 1 FOLLICULAR LYMPHOMA OF LYMPH NODES OF MULTIPLE REGIONS (MULTI): Primary | ICD-10-CM

## 2024-05-16 DIAGNOSIS — Z00.00 MEDICARE ANNUAL WELLNESS VISIT, SUBSEQUENT: ICD-10-CM

## 2024-05-16 LAB
ALBUMIN SERPL BCP-MCNC: 4.5 G/DL (ref 3.4–5)
ALP SERPL-CCNC: 68 U/L (ref 33–136)
ALT SERPL W P-5'-P-CCNC: 13 U/L (ref 7–45)
ANION GAP SERPL CALC-SCNC: 11 MMOL/L (ref 10–20)
AST SERPL W P-5'-P-CCNC: 19 U/L (ref 9–39)
BASOPHILS # BLD AUTO: 0.04 X10*3/UL (ref 0–0.1)
BASOPHILS NFR BLD AUTO: 0.7 %
BILIRUB SERPL-MCNC: 0.6 MG/DL (ref 0–1.2)
BUN SERPL-MCNC: 17 MG/DL (ref 6–23)
CALCIUM SERPL-MCNC: 10.1 MG/DL (ref 8.6–10.3)
CHLORIDE SERPL-SCNC: 104 MMOL/L (ref 98–107)
CO2 SERPL-SCNC: 31 MMOL/L (ref 21–32)
CREAT SERPL-MCNC: 0.67 MG/DL (ref 0.5–1.05)
EGFRCR SERPLBLD CKD-EPI 2021: >90 ML/MIN/1.73M*2
EOSINOPHIL # BLD AUTO: 0.09 X10*3/UL (ref 0–0.7)
EOSINOPHIL NFR BLD AUTO: 1.6 %
ERYTHROCYTE [DISTWIDTH] IN BLOOD BY AUTOMATED COUNT: 13.3 % (ref 11.5–14.5)
GLUCOSE SERPL-MCNC: 88 MG/DL (ref 74–99)
HCT VFR BLD AUTO: 42 % (ref 36–46)
HCV AB SER QL: NONREACTIVE
HGB BLD-MCNC: 13.4 G/DL (ref 12–16)
IMM GRANULOCYTES # BLD AUTO: 0.01 X10*3/UL (ref 0–0.7)
IMM GRANULOCYTES NFR BLD AUTO: 0.2 % (ref 0–0.9)
LDH SERPL L TO P-CCNC: 154 U/L (ref 84–246)
LYMPHOCYTES # BLD AUTO: 1.11 X10*3/UL (ref 1.2–4.8)
LYMPHOCYTES NFR BLD AUTO: 19.6 %
MCH RBC QN AUTO: 27.2 PG (ref 26–34)
MCHC RBC AUTO-ENTMCNC: 31.9 G/DL (ref 32–36)
MCV RBC AUTO: 85 FL (ref 80–100)
MONOCYTES # BLD AUTO: 0.48 X10*3/UL (ref 0.1–1)
MONOCYTES NFR BLD AUTO: 8.5 %
NEUTROPHILS # BLD AUTO: 3.94 X10*3/UL (ref 1.2–7.7)
NEUTROPHILS NFR BLD AUTO: 69.4 %
PLATELET # BLD AUTO: 224 X10*3/UL (ref 150–450)
POTASSIUM SERPL-SCNC: 3.7 MMOL/L (ref 3.5–5.3)
PROT SERPL-MCNC: 6.8 G/DL (ref 6.4–8.2)
RBC # BLD AUTO: 4.93 X10*6/UL (ref 4–5.2)
SODIUM SERPL-SCNC: 142 MMOL/L (ref 136–145)
WBC # BLD AUTO: 5.7 X10*3/UL (ref 4.4–11.3)

## 2024-05-16 PROCEDURE — G2211 COMPLEX E/M VISIT ADD ON: HCPCS | Performed by: INTERNAL MEDICINE

## 2024-05-16 PROCEDURE — 1159F MED LIST DOCD IN RCRD: CPT | Performed by: INTERNAL MEDICINE

## 2024-05-16 PROCEDURE — 1123F ACP DISCUSS/DSCN MKR DOCD: CPT | Performed by: INTERNAL MEDICINE

## 2024-05-16 PROCEDURE — 83615 LACTATE (LD) (LDH) ENZYME: CPT

## 2024-05-16 PROCEDURE — 1160F RVW MEDS BY RX/DR IN RCRD: CPT | Performed by: INTERNAL MEDICINE

## 2024-05-16 PROCEDURE — 1157F ADVNC CARE PLAN IN RCRD: CPT | Performed by: INTERNAL MEDICINE

## 2024-05-16 PROCEDURE — 99214 OFFICE O/P EST MOD 30 MIN: CPT | Performed by: INTERNAL MEDICINE

## 2024-05-16 PROCEDURE — 1126F AMNT PAIN NOTED NONE PRSNT: CPT | Performed by: INTERNAL MEDICINE

## 2024-05-16 PROCEDURE — 86803 HEPATITIS C AB TEST: CPT

## 2024-05-16 PROCEDURE — 36415 COLL VENOUS BLD VENIPUNCTURE: CPT

## 2024-05-16 PROCEDURE — 85025 COMPLETE CBC W/AUTO DIFF WBC: CPT

## 2024-05-16 PROCEDURE — 80053 COMPREHEN METABOLIC PANEL: CPT

## 2024-05-16 RX ORDER — ONDANSETRON HYDROCHLORIDE 2 MG/ML
4 INJECTION, SOLUTION INTRAVENOUS ONCE
Status: CANCELLED | OUTPATIENT
Start: 2024-05-16 | End: 2024-05-16

## 2024-05-16 RX ORDER — PROCHLORPERAZINE MALEATE 10 MG
10 TABLET ORAL EVERY 6 HOURS PRN
OUTPATIENT
Start: 2024-09-06

## 2024-05-16 RX ORDER — PROCHLORPERAZINE EDISYLATE 5 MG/ML
10 INJECTION INTRAMUSCULAR; INTRAVENOUS EVERY 6 HOURS PRN
Status: CANCELLED | OUTPATIENT
Start: 2024-05-16

## 2024-05-16 RX ORDER — FAMOTIDINE 10 MG/ML
40 INJECTION INTRAVENOUS ONCE
Status: CANCELLED | OUTPATIENT
Start: 2024-05-17

## 2024-05-16 RX ORDER — ALBUTEROL SULFATE 0.83 MG/ML
3 SOLUTION RESPIRATORY (INHALATION) AS NEEDED
OUTPATIENT
Start: 2024-09-06

## 2024-05-16 RX ORDER — FAMOTIDINE 10 MG/ML
40 INJECTION INTRAVENOUS ONCE
Status: DISCONTINUED | OUTPATIENT
Start: 2024-05-16 | End: 2024-05-16

## 2024-05-16 RX ORDER — PROCHLORPERAZINE EDISYLATE 5 MG/ML
10 INJECTION INTRAMUSCULAR; INTRAVENOUS EVERY 6 HOURS PRN
Status: DISCONTINUED | OUTPATIENT
Start: 2024-05-16 | End: 2024-05-16

## 2024-05-16 RX ORDER — FAMOTIDINE 10 MG/ML
20 INJECTION INTRAVENOUS ONCE AS NEEDED
OUTPATIENT
Start: 2024-11-01

## 2024-05-16 RX ORDER — DEXAMETHASONE SODIUM PHOSPHATE 100 MG/10ML
16 INJECTION INTRAMUSCULAR; INTRAVENOUS ONCE
Status: CANCELLED | OUTPATIENT
Start: 2024-05-16

## 2024-05-16 RX ORDER — EPINEPHRINE 0.3 MG/.3ML
0.3 INJECTION SUBCUTANEOUS EVERY 5 MIN PRN
Status: CANCELLED | OUTPATIENT
Start: 2024-05-16

## 2024-05-16 RX ORDER — FAMOTIDINE 10 MG/ML
40 INJECTION INTRAVENOUS ONCE
OUTPATIENT
Start: 2024-11-01 | End: 2024-11-01

## 2024-05-16 RX ORDER — DIPHENHYDRAMINE HYDROCHLORIDE 50 MG/ML
50 INJECTION INTRAMUSCULAR; INTRAVENOUS ONCE
Status: CANCELLED | OUTPATIENT
Start: 2024-07-11

## 2024-05-16 RX ORDER — ACETAMINOPHEN 325 MG/1
650 TABLET ORAL ONCE
OUTPATIENT
Start: 2024-09-06

## 2024-05-16 RX ORDER — DEXAMETHASONE SODIUM PHOSPHATE 100 MG/10ML
16 INJECTION INTRAMUSCULAR; INTRAVENOUS ONCE
Status: CANCELLED | OUTPATIENT
Start: 2024-09-05

## 2024-05-16 RX ORDER — DIPHENHYDRAMINE HYDROCHLORIDE 50 MG/ML
50 INJECTION INTRAMUSCULAR; INTRAVENOUS AS NEEDED
Status: DISCONTINUED | OUTPATIENT
Start: 2024-05-16 | End: 2024-05-16 | Stop reason: HOSPADM

## 2024-05-16 RX ORDER — DEXAMETHASONE SODIUM PHOSPHATE 100 MG/10ML
16 INJECTION INTRAMUSCULAR; INTRAVENOUS ONCE
Status: CANCELLED | OUTPATIENT
Start: 2024-07-11

## 2024-05-16 RX ORDER — PROCHLORPERAZINE MALEATE 10 MG
10 TABLET ORAL EVERY 6 HOURS PRN
Status: CANCELLED | OUTPATIENT
Start: 2024-05-16

## 2024-05-16 RX ORDER — DIPHENHYDRAMINE HYDROCHLORIDE 50 MG/ML
50 INJECTION INTRAMUSCULAR; INTRAVENOUS AS NEEDED
Status: CANCELLED | OUTPATIENT
Start: 2024-05-17

## 2024-05-16 RX ORDER — ACETAMINOPHEN 325 MG/1
650 TABLET ORAL ONCE
OUTPATIENT
Start: 2024-07-12

## 2024-05-16 RX ORDER — ALBUTEROL SULFATE 0.83 MG/ML
3 SOLUTION RESPIRATORY (INHALATION) AS NEEDED
Status: CANCELLED | OUTPATIENT
Start: 2024-05-16

## 2024-05-16 RX ORDER — PROCHLORPERAZINE EDISYLATE 5 MG/ML
10 INJECTION INTRAMUSCULAR; INTRAVENOUS EVERY 6 HOURS PRN
OUTPATIENT
Start: 2024-09-06

## 2024-05-16 RX ORDER — ACETAMINOPHEN 325 MG/1
650 TABLET ORAL ONCE
OUTPATIENT
Start: 2024-11-01

## 2024-05-16 RX ORDER — FAMOTIDINE 10 MG/ML
20 INJECTION INTRAVENOUS ONCE AS NEEDED
OUTPATIENT
Start: 2024-09-06

## 2024-05-16 RX ORDER — ONDANSETRON HYDROCHLORIDE 2 MG/ML
4 INJECTION, SOLUTION INTRAVENOUS ONCE
Status: CANCELLED | OUTPATIENT
Start: 2024-05-17

## 2024-05-16 RX ORDER — DIPHENHYDRAMINE HYDROCHLORIDE 50 MG/ML
50 INJECTION INTRAMUSCULAR; INTRAVENOUS ONCE
Status: CANCELLED | OUTPATIENT
Start: 2024-05-16

## 2024-05-16 RX ORDER — EPINEPHRINE 0.3 MG/.3ML
0.3 INJECTION SUBCUTANEOUS EVERY 5 MIN PRN
OUTPATIENT
Start: 2024-11-01

## 2024-05-16 RX ORDER — DIPHENHYDRAMINE HYDROCHLORIDE 50 MG/ML
50 INJECTION INTRAMUSCULAR; INTRAVENOUS AS NEEDED
OUTPATIENT
Start: 2024-09-06

## 2024-05-16 RX ORDER — ONDANSETRON HYDROCHLORIDE 2 MG/ML
4 INJECTION, SOLUTION INTRAVENOUS ONCE
OUTPATIENT
Start: 2024-09-06 | End: 2024-09-05

## 2024-05-16 RX ORDER — PROCHLORPERAZINE EDISYLATE 5 MG/ML
10 INJECTION INTRAMUSCULAR; INTRAVENOUS EVERY 6 HOURS PRN
Status: CANCELLED | OUTPATIENT
Start: 2024-05-17

## 2024-05-16 RX ORDER — HEPARIN 100 UNIT/ML
500 SYRINGE INTRAVENOUS AS NEEDED
Status: CANCELLED | OUTPATIENT
Start: 2024-05-16

## 2024-05-16 RX ORDER — ACETAMINOPHEN 325 MG/1
650 TABLET ORAL ONCE
Status: DISCONTINUED | OUTPATIENT
Start: 2024-05-16 | End: 2024-05-16

## 2024-05-16 RX ORDER — ACETAMINOPHEN 325 MG/1
650 TABLET ORAL ONCE
Status: CANCELLED | OUTPATIENT
Start: 2024-05-16

## 2024-05-16 RX ORDER — PROCHLORPERAZINE EDISYLATE 5 MG/ML
10 INJECTION INTRAMUSCULAR; INTRAVENOUS EVERY 6 HOURS PRN
OUTPATIENT
Start: 2024-07-12

## 2024-05-16 RX ORDER — EPINEPHRINE 0.3 MG/.3ML
0.3 INJECTION SUBCUTANEOUS EVERY 5 MIN PRN
Status: DISCONTINUED | OUTPATIENT
Start: 2024-05-16 | End: 2024-05-16 | Stop reason: HOSPADM

## 2024-05-16 RX ORDER — EPINEPHRINE 0.3 MG/.3ML
0.3 INJECTION SUBCUTANEOUS EVERY 5 MIN PRN
OUTPATIENT
Start: 2024-07-12

## 2024-05-16 RX ORDER — ACETAMINOPHEN 325 MG/1
650 TABLET ORAL ONCE
Status: CANCELLED | OUTPATIENT
Start: 2024-05-17

## 2024-05-16 RX ORDER — FAMOTIDINE 10 MG/ML
40 INJECTION INTRAVENOUS ONCE
OUTPATIENT
Start: 2024-07-12

## 2024-05-16 RX ORDER — FAMOTIDINE 10 MG/ML
40 INJECTION INTRAVENOUS ONCE
Status: CANCELLED | OUTPATIENT
Start: 2024-05-16

## 2024-05-16 RX ORDER — ALBUTEROL SULFATE 0.83 MG/ML
3 SOLUTION RESPIRATORY (INHALATION) AS NEEDED
Status: DISCONTINUED | OUTPATIENT
Start: 2024-05-16 | End: 2024-05-16 | Stop reason: HOSPADM

## 2024-05-16 RX ORDER — ALBUTEROL SULFATE 0.83 MG/ML
3 SOLUTION RESPIRATORY (INHALATION) AS NEEDED
Status: CANCELLED | OUTPATIENT
Start: 2024-05-17

## 2024-05-16 RX ORDER — ALBUTEROL SULFATE 0.83 MG/ML
3 SOLUTION RESPIRATORY (INHALATION) AS NEEDED
OUTPATIENT
Start: 2024-07-12

## 2024-05-16 RX ORDER — ONDANSETRON HYDROCHLORIDE 2 MG/ML
4 INJECTION, SOLUTION INTRAVENOUS ONCE
Status: DISCONTINUED | OUTPATIENT
Start: 2024-05-16 | End: 2024-05-16

## 2024-05-16 RX ORDER — DIPHENHYDRAMINE HYDROCHLORIDE 50 MG/ML
50 INJECTION INTRAMUSCULAR; INTRAVENOUS AS NEEDED
Status: CANCELLED | OUTPATIENT
Start: 2024-05-16

## 2024-05-16 RX ORDER — FAMOTIDINE 10 MG/ML
20 INJECTION INTRAVENOUS ONCE AS NEEDED
Status: DISCONTINUED | OUTPATIENT
Start: 2024-05-16 | End: 2024-05-16 | Stop reason: HOSPADM

## 2024-05-16 RX ORDER — ALBUTEROL SULFATE 0.83 MG/ML
3 SOLUTION RESPIRATORY (INHALATION) AS NEEDED
OUTPATIENT
Start: 2024-11-01

## 2024-05-16 RX ORDER — ONDANSETRON HYDROCHLORIDE 2 MG/ML
4 INJECTION, SOLUTION INTRAVENOUS ONCE
OUTPATIENT
Start: 2024-11-01 | End: 2024-11-01

## 2024-05-16 RX ORDER — FAMOTIDINE 10 MG/ML
20 INJECTION INTRAVENOUS ONCE AS NEEDED
OUTPATIENT
Start: 2024-07-12

## 2024-05-16 RX ORDER — FAMOTIDINE 10 MG/ML
20 INJECTION INTRAVENOUS ONCE AS NEEDED
Status: CANCELLED | OUTPATIENT
Start: 2024-05-17

## 2024-05-16 RX ORDER — PROCHLORPERAZINE MALEATE 10 MG
10 TABLET ORAL EVERY 6 HOURS PRN
Status: CANCELLED | OUTPATIENT
Start: 2024-05-17

## 2024-05-16 RX ORDER — DIPHENHYDRAMINE HYDROCHLORIDE 50 MG/ML
50 INJECTION INTRAMUSCULAR; INTRAVENOUS AS NEEDED
OUTPATIENT
Start: 2024-11-01

## 2024-05-16 RX ORDER — HEPARIN SODIUM,PORCINE/PF 10 UNIT/ML
50 SYRINGE (ML) INTRAVENOUS AS NEEDED
Status: CANCELLED | OUTPATIENT
Start: 2024-05-16

## 2024-05-16 RX ORDER — PROCHLORPERAZINE MALEATE 10 MG
10 TABLET ORAL EVERY 6 HOURS PRN
Status: DISCONTINUED | OUTPATIENT
Start: 2024-05-16 | End: 2024-05-16

## 2024-05-16 RX ORDER — EPINEPHRINE 0.3 MG/.3ML
0.3 INJECTION SUBCUTANEOUS EVERY 5 MIN PRN
OUTPATIENT
Start: 2024-09-06

## 2024-05-16 RX ORDER — PROCHLORPERAZINE MALEATE 10 MG
10 TABLET ORAL EVERY 6 HOURS PRN
OUTPATIENT
Start: 2024-11-01

## 2024-05-16 RX ORDER — EPINEPHRINE 0.3 MG/.3ML
0.3 INJECTION SUBCUTANEOUS EVERY 5 MIN PRN
Status: CANCELLED | OUTPATIENT
Start: 2024-05-17

## 2024-05-16 RX ORDER — PROCHLORPERAZINE MALEATE 10 MG
10 TABLET ORAL EVERY 6 HOURS PRN
OUTPATIENT
Start: 2024-07-12

## 2024-05-16 RX ORDER — DIPHENHYDRAMINE HYDROCHLORIDE 50 MG/ML
50 INJECTION INTRAMUSCULAR; INTRAVENOUS ONCE
Status: CANCELLED | OUTPATIENT
Start: 2024-09-05

## 2024-05-16 RX ORDER — PROCHLORPERAZINE EDISYLATE 5 MG/ML
10 INJECTION INTRAMUSCULAR; INTRAVENOUS EVERY 6 HOURS PRN
OUTPATIENT
Start: 2024-11-01

## 2024-05-16 RX ORDER — FAMOTIDINE 10 MG/ML
20 INJECTION INTRAVENOUS ONCE AS NEEDED
Status: CANCELLED | OUTPATIENT
Start: 2024-05-16

## 2024-05-16 RX ORDER — DIPHENHYDRAMINE HYDROCHLORIDE 50 MG/ML
50 INJECTION INTRAMUSCULAR; INTRAVENOUS AS NEEDED
OUTPATIENT
Start: 2024-07-12

## 2024-05-16 RX ORDER — ONDANSETRON HYDROCHLORIDE 2 MG/ML
4 INJECTION, SOLUTION INTRAVENOUS ONCE
OUTPATIENT
Start: 2024-07-12 | End: 2024-07-11

## 2024-05-16 RX ORDER — FAMOTIDINE 10 MG/ML
40 INJECTION INTRAVENOUS ONCE
OUTPATIENT
Start: 2024-09-06

## 2024-05-16 ASSESSMENT — PAIN SCALES - GENERAL: PAINLEVEL: 0-NO PAIN

## 2024-05-17 ENCOUNTER — INFUSION (OUTPATIENT)
Dept: HEMATOLOGY/ONCOLOGY | Facility: CLINIC | Age: 69
End: 2024-05-17
Payer: MEDICARE

## 2024-05-17 VITALS
RESPIRATION RATE: 16 BRPM | OXYGEN SATURATION: 96 % | BODY MASS INDEX: 27.64 KG/M2 | TEMPERATURE: 98.4 F | SYSTOLIC BLOOD PRESSURE: 120 MMHG | DIASTOLIC BLOOD PRESSURE: 71 MMHG | HEART RATE: 60 BPM | WEIGHT: 151.13 LBS

## 2024-05-17 DIAGNOSIS — C82.08 GRADE 1 FOLLICULAR LYMPHOMA OF LYMPH NODES OF MULTIPLE REGIONS (MULTI): ICD-10-CM

## 2024-05-17 PROCEDURE — 96374 THER/PROPH/DIAG INJ IV PUSH: CPT | Mod: INF

## 2024-05-17 PROCEDURE — 2500000004 HC RX 250 GENERAL PHARMACY W/ HCPCS (ALT 636 FOR OP/ED): Performed by: INTERNAL MEDICINE

## 2024-05-17 PROCEDURE — 96375 TX/PRO/DX INJ NEW DRUG ADDON: CPT | Mod: INF

## 2024-05-17 PROCEDURE — 96413 CHEMO IV INFUSION 1 HR: CPT

## 2024-05-17 PROCEDURE — 96367 TX/PROPH/DG ADDL SEQ IV INF: CPT

## 2024-05-17 PROCEDURE — 96415 CHEMO IV INFUSION ADDL HR: CPT

## 2024-05-17 RX ORDER — HEPARIN SODIUM,PORCINE/PF 10 UNIT/ML
50 SYRINGE (ML) INTRAVENOUS AS NEEDED
OUTPATIENT
Start: 2024-05-17

## 2024-05-17 RX ORDER — PROCHLORPERAZINE EDISYLATE 5 MG/ML
10 INJECTION INTRAMUSCULAR; INTRAVENOUS EVERY 6 HOURS PRN
Status: DISCONTINUED | OUTPATIENT
Start: 2024-05-17 | End: 2024-05-17 | Stop reason: HOSPADM

## 2024-05-17 RX ORDER — FAMOTIDINE 10 MG/ML
40 INJECTION INTRAVENOUS ONCE
Status: COMPLETED | OUTPATIENT
Start: 2024-05-17 | End: 2024-05-17

## 2024-05-17 RX ORDER — EPINEPHRINE 0.3 MG/.3ML
0.3 INJECTION SUBCUTANEOUS EVERY 5 MIN PRN
Status: DISCONTINUED | OUTPATIENT
Start: 2024-05-17 | End: 2024-05-17 | Stop reason: HOSPADM

## 2024-05-17 RX ORDER — FAMOTIDINE 10 MG/ML
20 INJECTION INTRAVENOUS ONCE AS NEEDED
Status: DISCONTINUED | OUTPATIENT
Start: 2024-05-17 | End: 2024-05-17 | Stop reason: HOSPADM

## 2024-05-17 RX ORDER — DIPHENHYDRAMINE HYDROCHLORIDE 50 MG/ML
50 INJECTION INTRAMUSCULAR; INTRAVENOUS AS NEEDED
Status: DISCONTINUED | OUTPATIENT
Start: 2024-05-17 | End: 2024-05-17 | Stop reason: HOSPADM

## 2024-05-17 RX ORDER — HEPARIN 100 UNIT/ML
500 SYRINGE INTRAVENOUS AS NEEDED
OUTPATIENT
Start: 2024-05-17

## 2024-05-17 RX ORDER — ONDANSETRON HYDROCHLORIDE 2 MG/ML
4 INJECTION, SOLUTION INTRAVENOUS ONCE
Status: COMPLETED | OUTPATIENT
Start: 2024-05-17 | End: 2024-05-17

## 2024-05-17 RX ORDER — ALBUTEROL SULFATE 0.83 MG/ML
3 SOLUTION RESPIRATORY (INHALATION) AS NEEDED
Status: DISCONTINUED | OUTPATIENT
Start: 2024-05-17 | End: 2024-05-17 | Stop reason: HOSPADM

## 2024-05-17 RX ORDER — PROCHLORPERAZINE MALEATE 10 MG
10 TABLET ORAL EVERY 6 HOURS PRN
Status: DISCONTINUED | OUTPATIENT
Start: 2024-05-17 | End: 2024-05-17 | Stop reason: HOSPADM

## 2024-05-17 RX ORDER — ACETAMINOPHEN 325 MG/1
650 TABLET ORAL ONCE
Status: COMPLETED | OUTPATIENT
Start: 2024-05-17 | End: 2024-05-17

## 2024-05-17 RX ADMIN — SODIUM CHLORIDE 600 MG: 9 INJECTION, SOLUTION INTRAVENOUS at 11:53

## 2024-05-17 RX ADMIN — DEXAMETHASONE SODIUM PHOSPHATE 16 MG: 10 INJECTION, SOLUTION INTRAMUSCULAR; INTRAVENOUS at 11:22

## 2024-05-17 RX ADMIN — FAMOTIDINE 40 MG: 10 INJECTION INTRAVENOUS at 10:55

## 2024-05-17 RX ADMIN — ACETAMINOPHEN 650 MG: 325 TABLET ORAL at 10:55

## 2024-05-17 RX ADMIN — DIPHENHYDRAMINE HYDROCHLORIDE 50 MG: 50 INJECTION, SOLUTION INTRAMUSCULAR; INTRAVENOUS at 10:54

## 2024-05-17 RX ADMIN — ONDANSETRON 4 MG: 2 INJECTION INTRAMUSCULAR; INTRAVENOUS at 10:55

## 2024-05-17 ASSESSMENT — PAIN SCALES - GENERAL: PAINLEVEL: 0-NO PAIN

## 2024-06-05 ENCOUNTER — APPOINTMENT (OUTPATIENT)
Dept: INTEGRATIVE MEDICINE | Facility: CLINIC | Age: 69
End: 2024-06-05
Payer: MEDICARE

## 2024-06-06 ENCOUNTER — ALLIED HEALTH (OUTPATIENT)
Dept: INTEGRATIVE MEDICINE | Facility: CLINIC | Age: 69
End: 2024-06-06
Payer: MEDICARE

## 2024-06-06 DIAGNOSIS — M99.04 SEGMENTAL AND SOMATIC DYSFUNCTION OF SACRAL REGION: ICD-10-CM

## 2024-06-06 DIAGNOSIS — M54.2 NECK PAIN: ICD-10-CM

## 2024-06-06 DIAGNOSIS — M47.816 LUMBAR SPONDYLOSIS: ICD-10-CM

## 2024-06-06 DIAGNOSIS — M53.3 SACRAL BACK PAIN: ICD-10-CM

## 2024-06-06 DIAGNOSIS — M99.01 SEGMENTAL AND SOMATIC DYSFUNCTION OF CERVICAL REGION: ICD-10-CM

## 2024-06-06 DIAGNOSIS — M47.812 CERVICAL SPONDYLOSIS WITHOUT MYELOPATHY: ICD-10-CM

## 2024-06-06 DIAGNOSIS — M99.03 SEGMENTAL AND SOMATIC DYSFUNCTION OF LUMBAR REGION: ICD-10-CM

## 2024-06-06 DIAGNOSIS — M79.10 MYALGIA: ICD-10-CM

## 2024-06-06 DIAGNOSIS — M99.02 SEGMENTAL AND SOMATIC DYSFUNCTION OF THORACIC REGION: ICD-10-CM

## 2024-06-06 DIAGNOSIS — M99.05 SEGMENTAL AND SOMATIC DYSFUNCTION OF PELVIC REGION: ICD-10-CM

## 2024-06-06 DIAGNOSIS — M99.00 SEGMENTAL AND SOMATIC DYSFUNCTION OF HEAD REGION: Primary | ICD-10-CM

## 2024-06-06 DIAGNOSIS — M79.9 POSTURAL STRAIN: ICD-10-CM

## 2024-06-06 PROCEDURE — 98941 CHIROPRACT MANJ 3-4 REGIONS: CPT | Performed by: CHIROPRACTOR

## 2024-06-06 ASSESSMENT — ENCOUNTER SYMPTOMS
AGITATION: 0
DIFFICULTY URINATING: 0
CHILLS: 0
FACIAL ASYMMETRY: 0
FEVER: 0
NAUSEA: 0
ALLERGIC/IMMUNOLOGIC COMMENTS: +SEASONAL ALLERGIES
ABDOMINAL PAIN: 0
SHORTNESS OF BREATH: 0
CONFUSION: 0

## 2024-06-06 NOTE — PROGRESS NOTES
Subjective   Patient ID: Yoana Herzog is a 69 y.o. female who presents today for the treatment of pain involving their left sided neck and right sided hip/sacrum pain.    This is visit 5 of the 2024 calendar year. Medicare.    Fall risk: None. No falls in the last 6 months.     HPI - 2 more infusions since our last treatment. Updated scans were great! No spots detected and she is in remission. She will continue with infusions as a precaution. She would like to continue to treat L>R neck and lower back/sacral pain and tightness.     Last treatment 5/7/24: she is having her updated scans next week.  Overall she is feeling a little bit better.  She would like to continue working on her chronically tight left neck and right lower back.  She has been doing a little more in the garden since her last treatment encounter but is trying to pace herself.    4/4/24: 5/13/24 PET scan to see how the infusions are working. 4 infusions so far for her Grade 1 follicular lymphoma.  Overall she is doing quite well.  She has been tolerating the more recent infusions better.  She is hopeful that she will be in remission when they do the updated scan in May.    Till then she like to continue with chiropractic care performing the same treatment modalities which have proven beneficial in the past.  She would like to focus care today on the left trapezius and neck area as well as the right lumbosacral and sacroiliac joint regions.    3/5/24: after her recent PET scan they decided to go ahead and start treating the lymphoma.  It was staged at 4.  She will be receiving infusions weekly until follow-up PET scan is performed to determine further course of treatment.  She is only had 1 infusion so far and it was pretty intense.  She ended up having a reaction and ended up going to the ER at [facility] that evening due to tightness in the throat.  There is some involvement with her lymphoma into the left clavicle area and under the left  "breast and she was told that she can continue with chiropractic care \"at her own risk\".  She and I discussed this and she would like to continue with the treatments because it does provide her significant pain relief.    2/6/24: since her last treatment encounter she did go to [place] with her family for a trip.  She did well but did have some aching pain in the lower back after all of the walking.  The neck held up better than she had expected.  We will focus treatment on the lower back hips and sacrum today but check neck and upper back as well as this is typically an area of tightness and stress for her.  Overall she is doing well no acute exacerbations.    1/10/24: she presents today with the same chief complaint of left greater than right trapezius and neck pain, and right greater than left sacral/lower back pains.  Nothing is acutely exacerbated, however she is anticipating a trip to [place] with her family in about a week and a half.  She wants to make sure she can move and hitting with her children and grandchildren for this trip.  No new symptoms.    Additional medical information: R lymph node removed 12/20/23, Dx with follicular lymphoma. Had right submandibular mass that was found by her dentist in September 2023. Following with Oncology/Hematology Dr. Stevenson.      11/28/23: no acute pain issues today, however the left neck has been bothering her more and the right sacrum has been a little more stiff than usual.  She has been participating in the [employer] for [employer] and has recently finished decorating her home for [employer].  She also mentions that she is recovering from a head cold which has contributed to some lack of energy and physical movement over the last week.    10/23/23: Yoana presents today with the same complaints of left sided neck and right side hip/sacrum pain. She has been doing well since our last encounter as she would like to focus treatment on the same regions as it has been " beneficial for her. She continues to do side jobs working with a local [employer]. No additional injuries or changes in her medical history.    9/26/23:  She would like to continue to focus treatment on the left greater than right upper trapezius and neck regions. 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 Therefore she has been doing a lot of bending lifting and reaching. She has not been doing this consecutive days so she has days in between for recovery and rest. She also continues to have soreness and tightness in the right sacrum which is another chronic area that has regular exacerbations and flareups.      INITIAL INTAKE/SUBJECTIVE 12/12/22: She has seen chiropractic in the past. Her last adjustment was about 5 weeks ago. Is not that she is dissatisfied with the care she has been given, but she is hoping that our treatments will help her completely resolve the issue to the point where she does not need regular care.     Today her primary complaint is left neck and shoulder pain. 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 She also enjoys sewing and notices this pain does get worse after she has been doing a lot of sewing. She will occasionally feel tingling into the left hand most notably in the #3 through 5 fingers. She does have some headaches but these are localized to the suboccipital region and do not radiate into the forehead eyes or temples. In the past her physical therapist and chiropractors have done manual stretching of the left shoulder and done adjustments which have helped.     Her secondary pain complaints are in the right hip glued and sacral pains. She does sleep with a pillow between the knees. She is unsure why but the last 6 to 12 months this has been getting progressively worse. She will occasionally have pain in the hamstring region but this is  rare. She will occasionally feel sharp intense pain in the hip or the lower back but this only typically lasts a few seconds. Generally is more of a dull aching pain. The point of max tenderness is localized to the middle of the right butt cheek. No one has worked on this or massage it in the past.    Review of Systems   Constitutional:  Negative for chills and fever.   HENT:  Negative for congestion and sneezing.    Eyes:  Negative for visual disturbance.   Respiratory:  Negative for shortness of breath.    Cardiovascular:  Negative for chest pain.   Gastrointestinal:  Negative for abdominal pain and nausea.   Endocrine: Negative for polyuria.        R cervical lymph node removed 12/20/23, Dx with follicular lymphoma- Receiving immunotherapy via infusions.    Genitourinary:  Negative for difficulty urinating.        2004 Hysterectomy   Musculoskeletal:         +Osteopenia, BL knee surgeries 1960/1970.   Skin:         +Rosacea    Allergic/Immunologic: Positive for environmental allergies.        +seasonal allergies   Neurological:  Negative for facial asymmetry.   Psychiatric/Behavioral:  Negative for agitation and confusion.      Objective   Observation : normal gait and forward head posture    Physical Exam  Examination findings (palpation & ROM): Tenderness palpated over the UT and levator scapulae on the left. Hypertonicity palpated over the lumbar paraspinals, right>left piriformis, right>left SIJ, right> left gluteal regions.    Segmental joint dysfunction was identified in the following areas using motion palpation and/or pain provocation assessment:  Cervical: C0-C3 (Supine)   Thoracic: T4-T9 (Prone)  Lumbar: L3-L5 (Scoop)  Sacrum: L5/S1 and right SI (Drop)    Today's treatment:  Performed spinal manipulation to the regions of segmental dysfunction identified on examination using age-appropriate and injury specific force, and manual diversified technique. Technique Used: diversified CMT, pelvic drop table  technique and manual traction.      Brief Seated IASTM and supine MFR to the L>R neck, upper trapezius and L shoulder region. Prone MFR, IC to the lumbar paraspinals, upper gluteals, SIJ, piriformis.         Treatment Plan:   The patient and I discussed the risks and benefits of chiropractic care. Based on the patient's subjective complaints along with the examination findings, it is advised that a course of chiropractic treatment by initiated. Consent for care was given both written and orally by the patient. The patient tolerated today's treatment with little or no additional discomfort and was instructed to contact the office for questions or concerns.     Treatment Frequency: Will see/treat patient every 2-4 weeks as therapeutic benefit is sustained, then frequency will be reduced to as needed for symptom management or as needed for acute flare-ups/exacerbation.     Please note: Voice-to-text software was used when completing this note.  While the note was proofread, portions may include grammatical errors.  Please contact me with any questions/concerns as it relates to these types of errors.

## 2024-06-26 ENCOUNTER — HOSPITAL ENCOUNTER (OUTPATIENT)
Dept: RADIOLOGY | Facility: CLINIC | Age: 69
Discharge: HOME | End: 2024-06-26
Payer: MEDICARE

## 2024-06-26 ENCOUNTER — OFFICE VISIT (OUTPATIENT)
Dept: ORTHOPEDIC SURGERY | Facility: CLINIC | Age: 69
End: 2024-06-26
Payer: MEDICARE

## 2024-06-26 DIAGNOSIS — M25.562 LEFT KNEE PAIN, UNSPECIFIED CHRONICITY: ICD-10-CM

## 2024-06-26 PROCEDURE — 99214 OFFICE O/P EST MOD 30 MIN: CPT | Performed by: STUDENT IN AN ORGANIZED HEALTH CARE EDUCATION/TRAINING PROGRAM

## 2024-06-26 PROCEDURE — 1157F ADVNC CARE PLAN IN RCRD: CPT | Performed by: STUDENT IN AN ORGANIZED HEALTH CARE EDUCATION/TRAINING PROGRAM

## 2024-06-26 PROCEDURE — 73564 X-RAY EXAM KNEE 4 OR MORE: CPT | Mod: LEFT SIDE | Performed by: RADIOLOGY

## 2024-06-26 PROCEDURE — 2500000004 HC RX 250 GENERAL PHARMACY W/ HCPCS (ALT 636 FOR OP/ED): Performed by: STUDENT IN AN ORGANIZED HEALTH CARE EDUCATION/TRAINING PROGRAM

## 2024-06-26 PROCEDURE — 73564 X-RAY EXAM KNEE 4 OR MORE: CPT | Mod: LT

## 2024-06-26 PROCEDURE — 20610 DRAIN/INJ JOINT/BURSA W/O US: CPT | Mod: LT | Performed by: STUDENT IN AN ORGANIZED HEALTH CARE EDUCATION/TRAINING PROGRAM

## 2024-06-26 PROCEDURE — 2500000005 HC RX 250 GENERAL PHARMACY W/O HCPCS: Performed by: STUDENT IN AN ORGANIZED HEALTH CARE EDUCATION/TRAINING PROGRAM

## 2024-06-26 PROCEDURE — 1123F ACP DISCUSS/DSCN MKR DOCD: CPT | Performed by: STUDENT IN AN ORGANIZED HEALTH CARE EDUCATION/TRAINING PROGRAM

## 2024-06-26 PROCEDURE — 1159F MED LIST DOCD IN RCRD: CPT | Performed by: STUDENT IN AN ORGANIZED HEALTH CARE EDUCATION/TRAINING PROGRAM

## 2024-06-26 PROCEDURE — 99204 OFFICE O/P NEW MOD 45 MIN: CPT | Performed by: STUDENT IN AN ORGANIZED HEALTH CARE EDUCATION/TRAINING PROGRAM

## 2024-06-26 RX ORDER — TRIAMCINOLONE ACETONIDE 40 MG/ML
40 INJECTION, SUSPENSION INTRA-ARTICULAR; INTRAMUSCULAR
Status: COMPLETED | OUTPATIENT
Start: 2024-06-26 | End: 2024-06-26

## 2024-06-26 RX ORDER — LIDOCAINE HYDROCHLORIDE 10 MG/ML
4 INJECTION INFILTRATION; PERINEURAL
Status: COMPLETED | OUTPATIENT
Start: 2024-06-26 | End: 2024-06-26

## 2024-06-26 NOTE — PROGRESS NOTES
Chief Complaint   Patient presents with    Left Knee - Pain     Xrays today        HPI  69-year-old female presents today for evaluation of her left knee.  Patient has had longstanding left knee pain for months to years duration some days better than others.  Did have a prior cortisone shot and what sounds to be like 2015 which did provide her significant relief.  Presents today for repeat evaluation of her left knee.  Of note last 6 weeks she has been having worsening knee pain no history of injury has tried Tylenol and ice and topical gels with little to no relief.  Past Medical History:   Diagnosis Date    Pain in unspecified knee     Knee pain    Pain in unspecified shoulder     Shoulder pain       Past Surgical History:   Procedure Laterality Date    HYSTERECTOMY  04/09/2018    Hysterectomy    KNEE SURGERY  04/09/2018    Knee Surgery    NECK SURGERY  12/20/2023    lesion neck sx    TONSILLECTOMY  04/09/2018    Tonsillectomy        Allergies   Allergen Reactions    Amoxicillin-Pot Clavulanate GI Upset and Nausea/vomiting    Bee Pollen Itching, Headache and Runny nose    House Dust Itching    House Dust Mite Itching and Runny nose    Rituxan [Rituximab] Itching     Pt recovered after receiving pepcid, benadryl and methylprednisone        Physical exam    General: Alert and oriented to place, person, and time.  No acute distress and breathing comfortably; pleasant and cooperative with the examination.  HEENT: Head is normocephalic and atraumatic.  Neck: Supple, no visible swelling.  Cardiovascular: Good perfusion to the affected extremity.  Lungs: No audible wheezing or labored breathing.  Abdomen: Nondistended  HEME/Lymph : No visible abnormalities bilateral lower extremity    Extremity:  Left knee:  Skin healthy and intact  No gross swelling or ecchymosis  Alignment: Neutral  Effusion: Mild  ROM: 10-1 10  Crepitance with range of motion  No pain with internal rotation of the hip  Tenderness to palpation:  Medial lateral joint line     No laxity to valgus stress  No laxity to varus stress  Negative Lachman´s test  Negative posterior drawer test  Mild pain with Brionna´s test     Neurovascular exam normal distally  2+ DP pulse and good cap refill    Diagnostics:  X-rays collected in clinic today: No acute osseous abnormality no fracture or dislocation appreciated there are moderate degenerative changes about the knee there is osteophytosis and sclerosis consistent with moderate knee arthritis about the medial compartment as well as about the patellofemoral compartment.  There is some chondrocalcinosis/calcification of the medial lateral meniscus consistent with pseudogout.    Procedure:  L Inj/Asp: L knee on 6/26/2024 10:13 AM  Indications: pain  Details: 22 G needle, anterolateral approach  Medications: 40 mg triamcinolone acetonide 40 mg/mL; 4 mL lidocaine 10 mg/mL (1 %)  Consent was given by the patient. Immediately prior to procedure a time out was called to verify the correct patient, procedure, equipment, support staff and site/side marked as required. Patient was prepped and draped in the usual sterile fashion.           Assessment:  69-year-old female with moderate tricompartmental knee arthritis, pseudogout    Treatment plan:  The natural history of the condition and its associated treatment alternatives including surgical and nonsurgical options were discussed with the patient at length.  Will proceed with a intra-articular injection today  I discussed risks and benefits of corticosteroid injection and the patient would like to proceed.  Discussed risks of skin depigmentation and the rare but possible intravascular injection of local anesthetic which can cause palpitations or arrhythmias. I discussed the possibility of a transient increase in blood sugar. I explained that the local anesthetic tends to work immediately, it may take 2-3 days for the full effect of the corticosteroid compound. Consent was  obtained and side confirmed by the patient.  Prior injection did provide her many years of relief  We will have her follow-up in 2 months time  If fails to improve may benefit from gel injection therapy  All of the patient's questions were answered.

## 2024-07-11 ENCOUNTER — APPOINTMENT (OUTPATIENT)
Dept: HEMATOLOGY/ONCOLOGY | Facility: CLINIC | Age: 69
End: 2024-07-11
Payer: MEDICARE

## 2024-07-12 ENCOUNTER — APPOINTMENT (OUTPATIENT)
Dept: HEMATOLOGY/ONCOLOGY | Facility: CLINIC | Age: 69
End: 2024-07-12
Payer: MEDICARE

## 2024-07-15 ASSESSMENT — ENCOUNTER SYMPTOMS
ALLERGIC/IMMUNOLOGIC COMMENTS: +SEASONAL ALLERGIES
AGITATION: 0
DIFFICULTY URINATING: 0
ABDOMINAL PAIN: 0
CONFUSION: 0
SHORTNESS OF BREATH: 0
FACIAL ASYMMETRY: 0
NAUSEA: 0
CHILLS: 0
FEVER: 0

## 2024-07-15 NOTE — PROGRESS NOTES
Subjective   Patient ID: Yoana Herzog is a 69 y.o. female who presents today for the treatment of pain involving their left sided neck and right sided hip/sacrum pain.    This is visit 6 of the 2024 calendar year. Medicare.    Fall risk: None. No falls in the last 6 months.     HPI -[facility] regarding left knee pain which was injected with a steroid.  This was previous to her family trip to the [place] and she is happy because she was able to be more mobile during her trip/vacation.  Unfortunately with all of the driving and steps and walking, the R sacrum is more aggravated.  She would like to continue to work on the right sacrum and also the left greater than right neck and upper trap.    Last treatment 6/6/24: 2 more infusions since our last treatment. Updated scans were great! No spots detected and she is in remission. She will continue with infusions as a precaution. She would like to continue to treat L>R neck and lower back/sacral pain and tightness.     5/7/24: she is having her updated scans next week.  Overall she is feeling a little bit better.  She would like to continue working on her chronically tight left neck and right lower back.  She has been doing a little more in the garden since her last treatment encounter but is trying to pace herself.    4/4/24: 5/13/24 PET scan to see how the infusions are working. 4 infusions so far for her Grade 1 follicular lymphoma.  Overall she is doing quite well.  She has been tolerating the more recent infusions better.  She is hopeful that she will be in remission when they do the updated scan in May.    Till then she like to continue with chiropractic care performing the same treatment modalities which have proven beneficial in the past.  She would like to focus care today on the left trapezius and neck area as well as the right lumbosacral and sacroiliac joint regions.    3/5/24: after her recent PET scan they  "decided to go ahead and start treating the lymphoma.  It was staged at 4.  She will be receiving infusions weekly until follow-up PET scan is performed to determine further course of treatment.  She is only had 1 infusion so far and it was pretty intense.  She ended up having a reaction and ended up going to the ER at [facility] that evening due to tightness in the throat.  There is some involvement with her lymphoma into the left clavicle area and under the left breast and she was told that she can continue with chiropractic care \"at her own risk\".  She and I discussed this and she would like to continue with the treatments because it does provide her significant pain relief.    2/6/24: since her last treatment encounter she did go to [place] with her family for a trip.  She did well but did have some aching pain in the lower back after all of the walking.  The neck held up better than she had expected.  We will focus treatment on the lower back hips and sacrum today but check neck and upper back as well as this is typically an area of tightness and stress for her.  Overall she is doing well no acute exacerbations.    1/10/24: she presents today with the same chief complaint of left greater than right trapezius and neck pain, and right greater than left sacral/lower back pains.  Nothing is acutely exacerbated, however she is anticipating a trip to [place] with her family in about a week and a half.  She wants to make sure she can move and hitting with her children and grandchildren for this trip.  No new symptoms.    Additional medical information: R lymph node removed 12/20/23, Dx with follicular lymphoma. Had right submandibular mass that was found by her dentist in September 2023. Following with Oncology/Hematology Dr. Stevenson.      11/28/23: no acute pain issues today, however the left neck has been bothering her more and the right sacrum has been a little more stiff than usual.  She has been participating in the " cooking for [email or web address] and has recently finished decorating her home for [employer].  She also mentions that she is recovering from a head cold which has contributed to some lack of energy and physical movement over the last week.    10/23/23: Yoana presents today with the same complaints of left sided neck and right side hip/sacrum pain. She has been doing well since our last encounter as she would like to focus treatment on the same regions as it has been beneficial for her. She continues to do side jobs working with a local [employer]. No additional injuries or changes in her medical history.    9/26/23:  She would like to continue to focus treatment on the left greater than right upper trapezius and neck regions. 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 Therefore she has been doing a lot of bending lifting and reaching. She has not been doing this consecutive days so she has days in between for recovery and rest. She also continues to have soreness and tightness in the right sacrum which is another chronic area that has regular exacerbations and flareups.      INITIAL INTAKE/SUBJECTIVE 12/12/22: She has seen chiropractic in the past. Her last adjustment was about 5 weeks ago. Is not that she is dissatisfied with the care she has been given, but she is hoping that our treatments will help her completely resolve the issue to the point where she does not need regular care.     Today her primary complaint is left neck and shoulder pain. 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 She also enjoys sewing and notices this pain does get worse after she has been doing a lot of sewing. She will occasionally feel tingling into the left hand most notably in the #3 through 5 fingers. She does have some headaches but these are localized to the suboccipital region and do not  radiate into the forehead eyes or temples. In the past her physical therapist and chiropractors have done manual stretching of the left shoulder and done adjustments which have helped.     Her secondary pain complaints are in the right hip glued and sacral pains. She does sleep with a pillow between the knees. She is unsure why but the last 6 to 12 months this has been getting progressively worse. She will occasionally have pain in the hamstring region but this is rare. She will occasionally feel sharp intense pain in the hip or the lower back but this only typically lasts a few seconds. Generally is more of a dull aching pain. The point of max tenderness is localized to the middle of the right butt cheek. No one has worked on this or massage it in the past.    Review of Systems   Constitutional:  Negative for chills and fever.   HENT:  Negative for congestion and sneezing.    Eyes:  Negative for visual disturbance.   Respiratory:  Negative for shortness of breath.    Cardiovascular:  Negative for chest pain.   Gastrointestinal:  Negative for abdominal pain and nausea.   Endocrine: Negative for polyuria.        R cervical lymph node removed 12/20/23, Dx with follicular lymphoma- Receiving immunotherapy via infusions.    Genitourinary:  Negative for difficulty urinating.        2004 Hysterectomy   Musculoskeletal:         +Osteopenia, BL knee surgeries 1960/1970.   Skin:         +Rosacea    Allergic/Immunologic: Positive for environmental allergies.        +seasonal allergies   Neurological:  Negative for facial asymmetry.   Psychiatric/Behavioral:  Negative for agitation and confusion.      Objective   Observation : normal gait and forward head posture    Physical Exam  Examination findings (palpation & ROM): Tenderness palpated over the UT and levator scapulae on the left. Hypertonicity palpated over the lumbar paraspinals, right>left piriformis, right>left SIJ, right> left gluteal regions.    Segmental joint  dysfunction was identified in the following areas using motion palpation and/or pain provocation assessment:  Cervical: C0-C3 (Supine)   Thoracic: T4-T9 (Prone)  Lumbar: L3-L5 (Scoop)  Sacrum: L5/S1 and right SI (Drop)    Today's treatment:  Performed spinal manipulation to the regions of segmental dysfunction identified on examination using age-appropriate and injury specific force, and manual diversified technique. Technique Used: diversified CMT, pelvic drop table technique and manual traction.      Brief Seated IASTM and supine MFR to the L>R neck, upper trapezius and L shoulder region. Prone MFR, IC to the lumbar paraspinals, upper gluteals, SIJ, piriformis.         ** No dry needling today    Treatment Plan:   The patient and I discussed the risks and benefits of chiropractic care. Based on the patient's subjective complaints along with the examination findings, it is advised that a course of chiropractic treatment by initiated. Consent for care was given both written and orally by the patient. The patient tolerated today's treatment with little or no additional discomfort and was instructed to contact the office for questions or concerns.     Treatment Frequency: Will see/treat patient every 2-4 weeks as therapeutic benefit is sustained, then frequency will be reduced to as needed for symptom management or as needed for acute flare-ups/exacerbation.     Please note: Voice-to-text software was used when completing this note.  While the note was proofread, portions may include grammatical errors.  Please contact me with any questions/concerns as it relates to these types of errors.

## 2024-07-16 ENCOUNTER — APPOINTMENT (OUTPATIENT)
Dept: INTEGRATIVE MEDICINE | Facility: CLINIC | Age: 69
End: 2024-07-16
Payer: MEDICARE

## 2024-07-16 DIAGNOSIS — M99.00 SEGMENTAL AND SOMATIC DYSFUNCTION OF HEAD REGION: Primary | ICD-10-CM

## 2024-07-16 DIAGNOSIS — M99.03 SEGMENTAL AND SOMATIC DYSFUNCTION OF LUMBAR REGION: ICD-10-CM

## 2024-07-16 DIAGNOSIS — M99.02 SEGMENTAL AND SOMATIC DYSFUNCTION OF THORACIC REGION: ICD-10-CM

## 2024-07-16 DIAGNOSIS — M99.05 SEGMENTAL AND SOMATIC DYSFUNCTION OF PELVIC REGION: ICD-10-CM

## 2024-07-16 DIAGNOSIS — M54.2 NECK PAIN: ICD-10-CM

## 2024-07-16 DIAGNOSIS — M53.3 SACRAL BACK PAIN: ICD-10-CM

## 2024-07-16 DIAGNOSIS — M47.812 CERVICAL SPONDYLOSIS WITHOUT MYELOPATHY: ICD-10-CM

## 2024-07-16 DIAGNOSIS — M47.816 LUMBAR SPONDYLOSIS: ICD-10-CM

## 2024-07-16 DIAGNOSIS — M79.9 POSTURAL STRAIN: ICD-10-CM

## 2024-07-16 DIAGNOSIS — M99.01 SEGMENTAL AND SOMATIC DYSFUNCTION OF CERVICAL REGION: ICD-10-CM

## 2024-07-16 DIAGNOSIS — M99.04 SEGMENTAL AND SOMATIC DYSFUNCTION OF SACRAL REGION: ICD-10-CM

## 2024-07-16 DIAGNOSIS — M79.10 MYALGIA: ICD-10-CM

## 2024-07-16 PROCEDURE — 98942 CHIROPRACTIC MANJ 5 REGIONS: CPT | Performed by: CHIROPRACTOR

## 2024-07-18 ENCOUNTER — INFUSION (OUTPATIENT)
Dept: HEMATOLOGY/ONCOLOGY | Facility: CLINIC | Age: 69
End: 2024-07-18
Payer: MEDICARE

## 2024-07-18 ENCOUNTER — APPOINTMENT (OUTPATIENT)
Dept: HEMATOLOGY/ONCOLOGY | Facility: CLINIC | Age: 69
End: 2024-07-18
Payer: MEDICARE

## 2024-07-18 VITALS
HEART RATE: 57 BPM | WEIGHT: 154.98 LBS | OXYGEN SATURATION: 96 % | SYSTOLIC BLOOD PRESSURE: 117 MMHG | BODY MASS INDEX: 28.35 KG/M2 | TEMPERATURE: 97.9 F | DIASTOLIC BLOOD PRESSURE: 72 MMHG | RESPIRATION RATE: 16 BRPM

## 2024-07-18 DIAGNOSIS — C82.08 GRADE 1 FOLLICULAR LYMPHOMA OF LYMPH NODES OF MULTIPLE REGIONS (MULTI): ICD-10-CM

## 2024-07-18 LAB
ALBUMIN SERPL BCP-MCNC: 4.1 G/DL (ref 3.4–5)
ALP SERPL-CCNC: 61 U/L (ref 33–136)
ALT SERPL W P-5'-P-CCNC: 15 U/L (ref 7–45)
ANION GAP SERPL CALC-SCNC: 11 MMOL/L (ref 10–20)
AST SERPL W P-5'-P-CCNC: 18 U/L (ref 9–39)
BASOPHILS # BLD AUTO: 0.05 X10*3/UL (ref 0–0.1)
BASOPHILS NFR BLD AUTO: 1 %
BILIRUB SERPL-MCNC: 0.6 MG/DL (ref 0–1.2)
BUN SERPL-MCNC: 14 MG/DL (ref 6–23)
CALCIUM SERPL-MCNC: 9.1 MG/DL (ref 8.6–10.3)
CHLORIDE SERPL-SCNC: 102 MMOL/L (ref 98–107)
CO2 SERPL-SCNC: 27 MMOL/L (ref 21–32)
CREAT SERPL-MCNC: 0.59 MG/DL (ref 0.5–1.05)
EGFRCR SERPLBLD CKD-EPI 2021: >90 ML/MIN/1.73M*2
EOSINOPHIL # BLD AUTO: 0.1 X10*3/UL (ref 0–0.7)
EOSINOPHIL NFR BLD AUTO: 1.9 %
ERYTHROCYTE [DISTWIDTH] IN BLOOD BY AUTOMATED COUNT: 13.5 % (ref 11.5–14.5)
GLUCOSE SERPL-MCNC: 109 MG/DL (ref 74–99)
HCT VFR BLD AUTO: 40.6 % (ref 36–46)
HGB BLD-MCNC: 12.7 G/DL (ref 12–16)
IMM GRANULOCYTES # BLD AUTO: 0.01 X10*3/UL (ref 0–0.7)
IMM GRANULOCYTES NFR BLD AUTO: 0.2 % (ref 0–0.9)
LDH SERPL L TO P-CCNC: 149 U/L (ref 84–246)
LYMPHOCYTES # BLD AUTO: 0.93 X10*3/UL (ref 1.2–4.8)
LYMPHOCYTES NFR BLD AUTO: 18 %
MCH RBC QN AUTO: 27 PG (ref 26–34)
MCHC RBC AUTO-ENTMCNC: 31.3 G/DL (ref 32–36)
MCV RBC AUTO: 86 FL (ref 80–100)
MONOCYTES # BLD AUTO: 0.56 X10*3/UL (ref 0.1–1)
MONOCYTES NFR BLD AUTO: 10.8 %
NEUTROPHILS # BLD AUTO: 3.52 X10*3/UL (ref 1.2–7.7)
NEUTROPHILS NFR BLD AUTO: 68.1 %
PLATELET # BLD AUTO: 216 X10*3/UL (ref 150–450)
POTASSIUM SERPL-SCNC: 3.8 MMOL/L (ref 3.5–5.3)
PROT SERPL-MCNC: 6.2 G/DL (ref 6.4–8.2)
RBC # BLD AUTO: 4.7 X10*6/UL (ref 4–5.2)
SODIUM SERPL-SCNC: 136 MMOL/L (ref 136–145)
URATE SERPL-MCNC: 3.6 MG/DL (ref 2.3–6.7)
WBC # BLD AUTO: 5.2 X10*3/UL (ref 4.4–11.3)

## 2024-07-18 PROCEDURE — 96415 CHEMO IV INFUSION ADDL HR: CPT

## 2024-07-18 PROCEDURE — 84550 ASSAY OF BLOOD/URIC ACID: CPT

## 2024-07-18 PROCEDURE — 96413 CHEMO IV INFUSION 1 HR: CPT

## 2024-07-18 PROCEDURE — 83615 LACTATE (LD) (LDH) ENZYME: CPT

## 2024-07-18 PROCEDURE — 84075 ASSAY ALKALINE PHOSPHATASE: CPT

## 2024-07-18 PROCEDURE — 96375 TX/PRO/DX INJ NEW DRUG ADDON: CPT | Mod: INF

## 2024-07-18 PROCEDURE — 2500000004 HC RX 250 GENERAL PHARMACY W/ HCPCS (ALT 636 FOR OP/ED): Performed by: INTERNAL MEDICINE

## 2024-07-18 PROCEDURE — 85025 COMPLETE CBC W/AUTO DIFF WBC: CPT

## 2024-07-18 RX ORDER — ALBUTEROL SULFATE 0.83 MG/ML
3 SOLUTION RESPIRATORY (INHALATION) AS NEEDED
Status: DISCONTINUED | OUTPATIENT
Start: 2024-07-18 | End: 2024-07-18 | Stop reason: HOSPADM

## 2024-07-18 RX ORDER — HEPARIN 100 UNIT/ML
500 SYRINGE INTRAVENOUS AS NEEDED
OUTPATIENT
Start: 2024-07-18

## 2024-07-18 RX ORDER — ONDANSETRON HYDROCHLORIDE 2 MG/ML
4 INJECTION, SOLUTION INTRAVENOUS ONCE
Status: COMPLETED | OUTPATIENT
Start: 2024-07-18 | End: 2024-07-18

## 2024-07-18 RX ORDER — FAMOTIDINE 10 MG/ML
40 INJECTION INTRAVENOUS ONCE
Status: COMPLETED | OUTPATIENT
Start: 2024-07-18 | End: 2024-07-18

## 2024-07-18 RX ORDER — ACETAMINOPHEN 325 MG/1
650 TABLET ORAL ONCE
Status: COMPLETED | OUTPATIENT
Start: 2024-07-18 | End: 2024-07-18

## 2024-07-18 RX ORDER — DIPHENHYDRAMINE HYDROCHLORIDE 50 MG/ML
50 INJECTION INTRAMUSCULAR; INTRAVENOUS AS NEEDED
Status: DISCONTINUED | OUTPATIENT
Start: 2024-07-18 | End: 2024-07-18 | Stop reason: HOSPADM

## 2024-07-18 RX ORDER — PROCHLORPERAZINE EDISYLATE 5 MG/ML
10 INJECTION INTRAMUSCULAR; INTRAVENOUS EVERY 6 HOURS PRN
Status: DISCONTINUED | OUTPATIENT
Start: 2024-07-18 | End: 2024-07-18 | Stop reason: HOSPADM

## 2024-07-18 RX ORDER — FAMOTIDINE 10 MG/ML
20 INJECTION INTRAVENOUS ONCE AS NEEDED
Status: DISCONTINUED | OUTPATIENT
Start: 2024-07-18 | End: 2024-07-18 | Stop reason: HOSPADM

## 2024-07-18 RX ORDER — EPINEPHRINE 0.3 MG/.3ML
0.3 INJECTION SUBCUTANEOUS EVERY 5 MIN PRN
Status: DISCONTINUED | OUTPATIENT
Start: 2024-07-18 | End: 2024-07-18 | Stop reason: HOSPADM

## 2024-07-18 RX ORDER — PROCHLORPERAZINE MALEATE 10 MG
10 TABLET ORAL EVERY 6 HOURS PRN
Status: DISCONTINUED | OUTPATIENT
Start: 2024-07-18 | End: 2024-07-18 | Stop reason: HOSPADM

## 2024-07-18 RX ORDER — HEPARIN SODIUM,PORCINE/PF 10 UNIT/ML
50 SYRINGE (ML) INTRAVENOUS AS NEEDED
OUTPATIENT
Start: 2024-07-18

## 2024-07-18 ASSESSMENT — PAIN SCALES - GENERAL: PAINLEVEL: 0-NO PAIN

## 2024-08-15 ENCOUNTER — APPOINTMENT (OUTPATIENT)
Dept: INTEGRATIVE MEDICINE | Facility: CLINIC | Age: 69
End: 2024-08-15
Payer: MEDICARE

## 2024-08-19 ENCOUNTER — OFFICE VISIT (OUTPATIENT)
Dept: ORTHOPEDIC SURGERY | Facility: CLINIC | Age: 69
End: 2024-08-19
Payer: MEDICARE

## 2024-08-19 DIAGNOSIS — M25.562 LEFT KNEE PAIN, UNSPECIFIED CHRONICITY: Primary | ICD-10-CM

## 2024-08-19 PROCEDURE — 1157F ADVNC CARE PLAN IN RCRD: CPT | Performed by: STUDENT IN AN ORGANIZED HEALTH CARE EDUCATION/TRAINING PROGRAM

## 2024-08-19 PROCEDURE — 99214 OFFICE O/P EST MOD 30 MIN: CPT | Performed by: STUDENT IN AN ORGANIZED HEALTH CARE EDUCATION/TRAINING PROGRAM

## 2024-08-19 PROCEDURE — 20610 DRAIN/INJ JOINT/BURSA W/O US: CPT | Mod: LT | Performed by: STUDENT IN AN ORGANIZED HEALTH CARE EDUCATION/TRAINING PROGRAM

## 2024-08-19 PROCEDURE — 1159F MED LIST DOCD IN RCRD: CPT | Performed by: STUDENT IN AN ORGANIZED HEALTH CARE EDUCATION/TRAINING PROGRAM

## 2024-08-19 PROCEDURE — 2500000004 HC RX 250 GENERAL PHARMACY W/ HCPCS (ALT 636 FOR OP/ED): Performed by: STUDENT IN AN ORGANIZED HEALTH CARE EDUCATION/TRAINING PROGRAM

## 2024-08-19 PROCEDURE — 2500000005 HC RX 250 GENERAL PHARMACY W/O HCPCS: Performed by: STUDENT IN AN ORGANIZED HEALTH CARE EDUCATION/TRAINING PROGRAM

## 2024-08-19 PROCEDURE — 1123F ACP DISCUSS/DSCN MKR DOCD: CPT | Performed by: STUDENT IN AN ORGANIZED HEALTH CARE EDUCATION/TRAINING PROGRAM

## 2024-08-19 RX ORDER — LIDOCAINE HYDROCHLORIDE 10 MG/ML
4 INJECTION INFILTRATION; PERINEURAL
Status: COMPLETED | OUTPATIENT
Start: 2024-08-19 | End: 2024-08-19

## 2024-08-19 RX ORDER — TRIAMCINOLONE ACETONIDE 40 MG/ML
40 INJECTION, SUSPENSION INTRA-ARTICULAR; INTRAMUSCULAR
Status: COMPLETED | OUTPATIENT
Start: 2024-08-19 | End: 2024-08-19

## 2024-08-19 NOTE — PROGRESS NOTES
Chief Complaint   Patient presents with    Left Knee - Follow-up     OA  - S/P CSI on 06-26-24       HPI  69-year-old female presents today for evaluation of her left knee.  Patient has had longstanding left knee pain for months to years duration some days better than others.  She is status post cortisone injection 6/26/2024.  She states that this did provide her some relief for several days to even a couple weeks however on her vacation she was [place] and this did aggravate her knee.  She is hoping to get another cortisone injection today.  Past Medical History:   Diagnosis Date    Pain in unspecified knee     Knee pain    Pain in unspecified shoulder     Shoulder pain       Past Surgical History:   Procedure Laterality Date    HYSTERECTOMY  04/09/2018    Hysterectomy    KNEE SURGERY  04/09/2018    Knee Surgery    NECK SURGERY  12/20/2023    lesion neck sx    TONSILLECTOMY  04/09/2018    Tonsillectomy        Allergies   Allergen Reactions    Amoxicillin-Pot Clavulanate GI Upset and Nausea/vomiting    Bee Pollen Itching, Headache and Runny nose    House Dust Itching    House Dust Mite Itching and Runny nose    Rituxan [Rituximab] Itching     Pt recovered after receiving pepcid, benadryl and methylprednisone        Physical exam    General: Alert and oriented to place, person, and time.  No acute distress and breathing comfortably; pleasant and cooperative with the examination.  HEENT: Head is normocephalic and atraumatic.  Neck: Supple, no visible swelling.  Cardiovascular: Good perfusion to the affected extremity.  Lungs: No audible wheezing or labored breathing.  Abdomen: Nondistended  HEME/Lymph : No visible abnormalities bilateral lower extremity    Extremity:  Left knee:  Skin healthy and intact  No gross swelling or ecchymosis  Alignment: Neutral  Effusion: Mild  ROM: 10-1 10  Crepitance with range of motion  No pain with internal rotation of the hip  Tenderness to palpation: Medial  lateral joint line     No laxity to valgus stress  No laxity to varus stress  Negative Lachman´s test  Negative posterior drawer test  Mild pain with Brionna´s test     Neurovascular exam normal distally  2+ DP pulse and good cap refill    Diagnostics:  X-rays collected in clinic today: No acute osseous abnormality no fracture or dislocation appreciated there are moderate degenerative changes about the knee there is osteophytosis and sclerosis consistent with moderate knee arthritis about the medial compartment as well as about the patellofemoral compartment.  There is some chondrocalcinosis/calcification of the medial lateral meniscus consistent with pseudogout.    Procedure:  L Inj/Asp: L knee on 8/19/2024 11:56 AM  Indications: pain  Details: 22 G needle, anterolateral approach  Medications: 40 mg triamcinolone acetonide 40 mg/mL; 4 mL lidocaine 10 mg/mL (1 %)  Consent was given by the patient. Immediately prior to procedure a time out was called to verify the correct patient, procedure, equipment, support staff and site/side marked as required. Patient was prepped and draped in the usual sterile fashion.           Assessment:  69-year-old female with moderate tricompartmental knee arthritis, pseudogout    Treatment plan:  The natural history of the condition and its associated treatment alternatives including surgical and nonsurgical options were discussed with the patient at length.  Will proceed with a intra-articular injection today  I discussed risks and benefits of corticosteroid injection and the patient would like to proceed.  Discussed risks of skin depigmentation and the rare but possible intravascular injection of local anesthetic which can cause palpitations or arrhythmias. I discussed the possibility of a transient increase in blood sugar. I explained that the local anesthetic tends to work immediately, it may take 2-3 days for the full effect of the corticosteroid compound. Consent was obtained and  side confirmed by the patient.  We will have her follow-up in 2 months time  If fails to improve may benefit from gel injection therapy  All of the patient's questions were answered.

## 2024-08-22 ENCOUNTER — APPOINTMENT (OUTPATIENT)
Dept: INTEGRATIVE MEDICINE | Facility: CLINIC | Age: 69
End: 2024-08-22
Payer: MEDICARE

## 2024-08-22 DIAGNOSIS — M53.3 SACRAL BACK PAIN: ICD-10-CM

## 2024-08-22 DIAGNOSIS — M99.03 SEGMENTAL AND SOMATIC DYSFUNCTION OF LUMBAR REGION: ICD-10-CM

## 2024-08-22 DIAGNOSIS — M47.816 LUMBAR SPONDYLOSIS: ICD-10-CM

## 2024-08-22 DIAGNOSIS — M99.01 SEGMENTAL AND SOMATIC DYSFUNCTION OF CERVICAL REGION: ICD-10-CM

## 2024-08-22 DIAGNOSIS — M99.04 SEGMENTAL AND SOMATIC DYSFUNCTION OF SACRAL REGION: ICD-10-CM

## 2024-08-22 DIAGNOSIS — M99.00 SEGMENTAL AND SOMATIC DYSFUNCTION OF HEAD REGION: Primary | ICD-10-CM

## 2024-08-22 DIAGNOSIS — M99.05 SEGMENTAL AND SOMATIC DYSFUNCTION OF PELVIC REGION: ICD-10-CM

## 2024-08-22 DIAGNOSIS — M54.2 NECK PAIN: ICD-10-CM

## 2024-08-22 DIAGNOSIS — M99.02 SEGMENTAL AND SOMATIC DYSFUNCTION OF THORACIC REGION: ICD-10-CM

## 2024-08-22 DIAGNOSIS — M47.812 CERVICAL SPONDYLOSIS WITHOUT MYELOPATHY: ICD-10-CM

## 2024-08-22 PROCEDURE — 98942 CHIROPRACTIC MANJ 5 REGIONS: CPT | Performed by: CHIROPRACTOR

## 2024-08-22 ASSESSMENT — ENCOUNTER SYMPTOMS
DIFFICULTY URINATING: 0
AGITATION: 0
FACIAL ASYMMETRY: 0
SHORTNESS OF BREATH: 0
CHILLS: 0
ALLERGIC/IMMUNOLOGIC COMMENTS: +SEASONAL ALLERGIES
NAUSEA: 0
FEVER: 0
ABDOMINAL PAIN: 0
CONFUSION: 0

## 2024-08-22 NOTE — PROGRESS NOTES
Subjective   Patient ID: Yoana Herzog is a 69 y.o. female who presents today for the treatment of pain involving their left sided neck and right sided hip/sacrum pain.    This is visit 7 of the 2024 calendar year. Medicare.    Fall risk: None. No falls in the last 6 months.     HPI -she had a second cortisone injection into the left knee and unfortunately had a reaction.  She had some swelling and pain and then also had some flushing of the cheeks.  The symptoms have improved but it was notable and she may be reluctant to have additional steroid injections in the future.    She also explains that she had a viral infection after her trip to the [place] but is feeling better now.  Today she would like to continue working on the chronically painful left neck/trapezius and the right sacrum/gluteal.    Last treatment 7/16/24: consultation by [facility] for orthopedics on June 26, 2024 regarding left knee pain which was injected with a steroid.  This was previous to her family trip to the [place] and she is happy because she was able to be more mobile during her trip/vacation.  Unfortunately with all of the driving and steps and walking, the R sacrum is more aggravated.  She would like to continue to work on the right sacrum and also the left greater than right neck and upper trap.    6/6/24: 2 more infusions since our last treatment. Updated scans were great! No spots detected and she is in remission. She will continue with infusions as a precaution. She would like to continue to treat L>R neck and lower back/sacral pain and tightness.     5/7/24: she is having her updated scans next week.  Overall she is feeling a little bit better.  She would like to continue working on her chronically tight left neck and right lower back.  She has been doing a little more in the garden since her last treatment encounter but is trying to pace herself.    4/4/24: 5/13/24 PET scan to see how the infusions are working. 4 infusions  "so far for her Grade 1 follicular lymphoma.  Overall she is doing quite well.  She has been tolerating the more recent infusions better.  She is hopeful that she will be in remission when they do the updated scan in May.    Till then she like to continue with chiropractic care performing the same treatment modalities which have proven beneficial in the past.  She would like to focus care today on the left trapezius and neck area as well as the right lumbosacral and sacroiliac joint regions.    3/5/24: after her recent PET scan they decided to go ahead and start treating the lymphoma.  It was staged at 4.  She will be receiving infusions weekly until follow-up PET scan is performed to determine further course of treatment.  She is only had 1 infusion so far and it was pretty intense.  She ended up having a reaction and ended up going to the ER at [facility] that evening due to tightness in the throat.  There is some involvement with her lymphoma into the left clavicle area and under the left breast and she was told that she can continue with chiropractic care \"at her own risk\".  She and I discussed this and she would like to continue with the treatments because it does provide her significant pain relief.    2/6/24: since her last treatment encounter she did go to [place] with her family for a trip.  She did well but did have some aching pain in the lower back after all of the walking.  The neck held up better than she had expected.  We will focus treatment on the lower back hips and sacrum today but check neck and upper back as well as this is typically an area of tightness and stress for her.  Overall she is doing well no acute exacerbations.    1/10/24: she presents today with the same chief complaint of left greater than right trapezius and neck pain, and right greater than left sacral/lower back pains.  Nothing is acutely exacerbated, however she is anticipating a trip to [place] with her family in about a week " and a half.  She wants to make sure she can move and hitting with her children and grandchildren for this trip.  No new symptoms.    Additional medical information: R lymph node removed 12/20/23, Dx with follicular lymphoma. Had right submandibular mass that was found by her dentist in September 2023. Following with Oncology/Hematology Dr. Stevenson.      11/28/23: no acute pain issues today, however the left neck has been bothering her more and the right sacrum has been a little more stiff than usual.  She has been participating in the cooking for Thanksgiving and has recently finished decorating her home for [employer].  She also mentions that she is recovering from a head cold which has contributed to some lack of energy and physical movement over the last week.    10/23/23: Yoana presents today with the same complaints of left sided neck and right side hip/sacrum pain. She has been doing well since our last encounter as she would like to focus treatment on the same regions as it has been beneficial for her. 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 No additional injuries or changes in her medical history.    9/26/23:  She would like to continue to focus treatment on the left greater than right upper trapezius and neck regions. 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 Therefore she has been doing a lot of bending lifting and reaching. She has not been doing this consecutive days so she has days in between for recovery and rest. She also continues to have soreness and tightness in the right sacrum which is another chronic area that has regular exacerbations and flareups.      INITIAL INTAKE/SUBJECTIVE 12/12/22: She has seen chiropractic in the past. Her last adjustment was about 5 weeks ago. Is not that she is dissatisfied with the care she has been given, but she is hoping that our treatments will help her completely resolve the issue to  the point where she does not need regular care.     Today her primary complaint is left neck and shoulder pain. 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 She also enjoys sewing and notices this pain does get worse after she has been doing a lot of sewing. She will occasionally feel tingling into the left hand most notably in the #3 through 5 fingers. She does have some headaches but these are localized to the suboccipital region and do not radiate into the forehead eyes or temples. In the past her physical therapist and chiropractors have done manual stretching of the left shoulder and done adjustments which have helped.     Her secondary pain complaints are in the right hip glued and sacral pains. She does sleep with a pillow between the knees. She is unsure why but the last 6 to 12 months this has been getting progressively worse. She will occasionally have pain in the hamstring region but this is rare. She will occasionally feel sharp intense pain in the hip or the lower back but this only typically lasts a few seconds. Generally is more of a dull aching pain. The point of max tenderness is localized to the middle of the right butt cheek. No one has worked on this or massage it in the past.    Review of Systems   Constitutional:  Negative for chills and fever.   HENT:  Negative for congestion and sneezing.    Eyes:  Negative for visual disturbance.   Respiratory:  Negative for shortness of breath.    Cardiovascular:  Negative for chest pain.   Gastrointestinal:  Negative for abdominal pain and nausea.   Endocrine: Negative for polyuria.        R cervical lymph node removed 12/20/23, Dx with follicular lymphoma- Receiving immunotherapy via infusions.    Genitourinary:  Negative for difficulty urinating.        2004 Hysterectomy   Musculoskeletal:         +Osteopenia, BL knee surgeries 1960/1970.   Skin:         +Rosacea     Allergic/Immunologic: Positive for environmental allergies.        +seasonal allergies   Neurological:  Negative for facial asymmetry.   Psychiatric/Behavioral:  Negative for agitation and confusion.      Objective   Observation : normal gait and forward head posture    Physical Exam  Examination findings (palpation & ROM): Tenderness palpated over the UT and levator scapulae on the left. Hypertonicity palpated over the lumbar paraspinals, right>left piriformis, right>left SIJ, right> left gluteal regions.    Segmental joint dysfunction was identified in the following areas using motion palpation and/or pain provocation assessment:  Cervical: C0-C3 (Supine)   Thoracic: T4-T9 (Prone)  Lumbar: L3-L5 (Scoop)  Sacrum: L5/S1 and right SI (Drop)    Today's treatment:  Performed spinal manipulation to the regions of segmental dysfunction identified on examination using age-appropriate and injury specific force, and manual diversified technique. Technique Used: diversified CMT, pelvic drop table technique and manual traction.    Brief Seated IASTM and supine MFR to the L>R neck, upper trapezius and L shoulder region. Side lying MFR, IC to the R lower lumbar paraspinals, R gluteals, R SIJ, R piriformis.         ** No dry needling today    Treatment Plan:   The patient and I discussed the risks and benefits of chiropractic care. Based on the patient's subjective complaints along with the examination findings, it is advised that a course of chiropractic treatment by initiated. Consent for care was given both written and orally by the patient. The patient tolerated today's treatment with little or no additional discomfort and was instructed to contact the office for questions or concerns.     Treatment Frequency: Will see/treat patient every 2-4 weeks as therapeutic benefit is sustained, then frequency will be reduced to as needed for symptom management or as needed for acute flare-ups/exacerbation.     Please note:  Voice-to-text software was used when completing this note.  While the note was proofread, portions may include grammatical errors.  Please contact me with any questions/concerns as it relates to these types of errors.

## 2024-09-05 ENCOUNTER — APPOINTMENT (OUTPATIENT)
Dept: HEMATOLOGY/ONCOLOGY | Facility: CLINIC | Age: 69
End: 2024-09-05
Payer: MEDICARE

## 2024-09-05 ENCOUNTER — TELEPHONE (OUTPATIENT)
Dept: HEMATOLOGY/ONCOLOGY | Facility: CLINIC | Age: 69
End: 2024-09-05
Payer: MEDICARE

## 2024-09-05 NOTE — TELEPHONE ENCOUNTER
"Message left for Dr. Doty to discuss Yoana. Per Dr Stevenson \" No contradictions to proceed and nothing needs to be done prior.\" Office number was left and I will wait his return call.   "

## 2024-09-05 NOTE — TELEPHONE ENCOUNTER
I spoke with Dr. Doty and told him all information. He was appreciative and asked if we can send a letter of medical clearance to number [phone]. Letter was sent and success received. All questions were answered and he will reach out to our office with any further questions.

## 2024-09-05 NOTE — TELEPHONE ENCOUNTER
VM  Patient in need of tooth extraction and possibel implant.  Asking to speak with Dr. Stevenson regarding her medications and further dental treatment.  Call Cell at: [phone] or office: [phone]

## 2024-09-06 ENCOUNTER — APPOINTMENT (OUTPATIENT)
Dept: HEMATOLOGY/ONCOLOGY | Facility: CLINIC | Age: 69
End: 2024-09-06
Payer: MEDICARE

## 2024-09-10 ENCOUNTER — APPOINTMENT (OUTPATIENT)
Dept: INTEGRATIVE MEDICINE | Facility: CLINIC | Age: 69
End: 2024-09-10
Payer: MEDICARE

## 2024-09-10 DIAGNOSIS — M47.816 LUMBAR SPONDYLOSIS: ICD-10-CM

## 2024-09-10 DIAGNOSIS — M47.812 CERVICAL SPONDYLOSIS WITHOUT MYELOPATHY: ICD-10-CM

## 2024-09-10 DIAGNOSIS — M99.01 SEGMENTAL AND SOMATIC DYSFUNCTION OF CERVICAL REGION: ICD-10-CM

## 2024-09-10 DIAGNOSIS — M99.02 SEGMENTAL AND SOMATIC DYSFUNCTION OF THORACIC REGION: ICD-10-CM

## 2024-09-10 DIAGNOSIS — M54.2 NECK PAIN: ICD-10-CM

## 2024-09-10 DIAGNOSIS — M53.3 SACRAL BACK PAIN: ICD-10-CM

## 2024-09-10 DIAGNOSIS — M99.05 SEGMENTAL AND SOMATIC DYSFUNCTION OF PELVIC REGION: ICD-10-CM

## 2024-09-10 DIAGNOSIS — M99.04 SEGMENTAL AND SOMATIC DYSFUNCTION OF SACRAL REGION: ICD-10-CM

## 2024-09-10 DIAGNOSIS — M79.9 POSTURAL STRAIN: ICD-10-CM

## 2024-09-10 DIAGNOSIS — M99.00 SEGMENTAL AND SOMATIC DYSFUNCTION OF HEAD REGION: Primary | ICD-10-CM

## 2024-09-10 DIAGNOSIS — M54.50 CHRONIC RIGHT-SIDED LOW BACK PAIN WITHOUT SCIATICA: ICD-10-CM

## 2024-09-10 DIAGNOSIS — M79.10 MYALGIA: ICD-10-CM

## 2024-09-10 DIAGNOSIS — G89.29 CHRONIC RIGHT-SIDED LOW BACK PAIN WITHOUT SCIATICA: ICD-10-CM

## 2024-09-10 DIAGNOSIS — M99.03 SEGMENTAL AND SOMATIC DYSFUNCTION OF LUMBAR REGION: ICD-10-CM

## 2024-09-10 PROCEDURE — 98942 CHIROPRACTIC MANJ 5 REGIONS: CPT | Performed by: CHIROPRACTOR

## 2024-09-10 ASSESSMENT — ENCOUNTER SYMPTOMS
DIFFICULTY URINATING: 0
FACIAL ASYMMETRY: 0
ALLERGIC/IMMUNOLOGIC COMMENTS: +SEASONAL ALLERGIES
AGITATION: 0
FEVER: 0
CONFUSION: 0
SHORTNESS OF BREATH: 0
ABDOMINAL PAIN: 0
NAUSEA: 0
CHILLS: 0

## 2024-09-10 NOTE — PROGRESS NOTES
Subjective   Patient ID: Yoana Herzog is a 69 y.o. female who presents today for the treatment of pain involving their left sided neck and right sided hip/sacrum pain.    This is visit 8 of the 2024 calendar year. Medicare.    Fall risk: None. No falls in the last 6 months.     HPI -pain has not been interval but she continues to have left neck and trap tightness as well as right sacral stiffness and soreness.  She has had some increase stress at home because her  was hospitalized with a severe case of pneumonia, and she is dealing with an infected tooth.  Due to her infusion treatments, the extraction and treatment of her tooth will be delayed.    Last treatment 8/22/24: she had a second cortisone injection into the left knee and unfortunately had a reaction.  She had some swelling and pain and then also had some flushing of the cheeks.  The symptoms have improved but it was notable and she may be reluctant to have additional steroid injections in the future.    She also explains that she had a viral infection after her trip to the [employer] but is feeling better now.  Today she would like to continue working on the chronically painful left neck/trapezius and the right sacrum/gluteal.    7/16/24: consultation by [facility] for orthopedics on June 26, 2024 regarding left knee pain which was injected with a steroid.  This was previous to her family trip to the [employer] and she is happy because she was able to be more mobile during her trip/vacation.  Unfortunately with all of the driving and steps and walking, the R sacrum is more aggravated.  She would like to continue to work on the right sacrum and also the left greater than right neck and upper trap.    6/6/24: 2 more infusions since our last treatment. Updated scans were great! No spots detected and she is in remission. She will continue with infusions as a precaution. She would like to continue to treat L>R neck and lower back/sacral pain and  "tightness.     5/7/24: she is having her updated scans next week.  Overall she is feeling a little bit better.  She would like to continue working on her chronically tight left neck and right lower back.  She has been doing a little more in the garden since her last treatment encounter but is trying to pace herself.    4/4/24: 5/13/24 PET scan to see how the infusions are working. 4 infusions so far for her Grade 1 follicular lymphoma.  Overall she is doing quite well.  She has been tolerating the more recent infusions better.  She is hopeful that she will be in remission when they do the updated scan in May.    Till then she like to continue with chiropractic care performing the same treatment modalities which have proven beneficial in the past.  She would like to focus care today on the left trapezius and neck area as well as the right lumbosacral and sacroiliac joint regions.    3/5/24: after her recent PET scan they decided to go ahead and start treating the lymphoma.  It was staged at 4.  She will be receiving infusions weekly until follow-up PET scan is performed to determine further course of treatment.  She is only had 1 infusion so far and it was pretty intense.  She ended up having a reaction and ended up going to the ER at [facility] that evening due to tightness in the throat.  There is some involvement with her lymphoma into the left clavicle area and under the left breast and she was told that she can continue with chiropractic care \"at her own risk\".  She and I discussed this and she would like to continue with the treatments because it does provide her significant pain relief.    2/6/24: since her last treatment encounter she did go to [place] with her family for a trip.  She did well but did have some aching pain in the lower back after all of the walking.  The neck held up better than she had expected.  We will focus treatment on the lower back hips and sacrum today but check neck and upper back as " well as this is typically an area of tightness and stress for her.  Overall she is doing well no acute exacerbations.    1/10/24: she presents today with the same chief complaint of left greater than right trapezius and neck pain, and right greater than left sacral/lower back pains.  Nothing is acutely exacerbated, however she is anticipating a trip to [place] with her family in about a week and a half.  She wants to make sure she can move and hitting with her children and grandchildren for this trip.  No new symptoms.    Additional medical information: R lymph node removed 12/20/23, Dx with follicular lymphoma. Had right submandibular mass that was found by her dentist in September 2023. Following with Oncology/Hematology Dr. Stevenson.      11/28/23: no acute pain issues today, however the left neck has been bothering her more and the right sacrum has been a little more stiff than usual.  She has been participating in the [employer] and has recently finished decorating her home for [place].  She also mentions that she is recovering from a head cold which has contributed to some lack of energy and physical movement over the last week.    10/23/23: Yoana presents today with the same complaints of left sided neck and right side hip/sacrum pain. She has been doing well since our last encounter as she would like to focus treatment on the same regions as it has been beneficial for her. 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 No additional injuries or changes in her medical history.  _____   INITIAL INTAKE/SUBJECTIVE 12/12/22: She has seen chiropractic in the past. Her last adjustment was about 5 weeks ago. Is not that she is dissatisfied with the care she has been given, but she is hoping that our treatments will help her completely resolve the issue to the point where she does not need regular care.     Today her primary complaint is left neck and shoulder pain. 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 She also enjoys sewing and notices this pain does get worse after she has been doing a lot of sewing. She will occasionally feel tingling into the left hand most notably in the #3 through 5 fingers. She does have some headaches but these are localized to the suboccipital region and do not radiate into the forehead eyes or temples. In the past her physical therapist and chiropractors have done manual stretching of the left shoulder and done adjustments which have helped.     Her secondary pain complaints are in the right hip glued and sacral pains. She does sleep with a pillow between the knees. She is unsure why but the last 6 to 12 months this has been getting progressively worse. She will occasionally have pain in the hamstring region but this is rare. She will occasionally feel sharp intense pain in the hip or the lower back but this only typically lasts a few seconds. Generally is more of a dull aching pain. The point of max tenderness is localized to the middle of the right butt cheek. No one has worked on this or massage it in the past.    Review of Systems   Constitutional:  Negative for chills and fever.   HENT:  Negative for congestion and sneezing.    Eyes:  Negative for visual disturbance.   Respiratory:  Negative for shortness of breath.    Cardiovascular:  Negative for chest pain.   Gastrointestinal:  Negative for abdominal pain and nausea.   Endocrine: Negative for polyuria.        R cervical lymph node removed 12/20/23, Dx with follicular lymphoma- Receiving immunotherapy via infusions.    Genitourinary:  Negative for difficulty urinating.        2004 Hysterectomy   Musculoskeletal:         +Osteopenia, BL knee surgeries 1960/1970.   Skin:         +Rosacea    Allergic/Immunologic: Positive for environmental allergies.        +seasonal allergies   Neurological:  Negative for facial asymmetry.    Psychiatric/Behavioral:  Negative for agitation and confusion.      Objective   Observation : normal gait and forward head posture    Physical Exam  Examination findings (palpation & ROM): Tenderness palpated over the UT and levator scapulae on the left. Hypertonicity palpated over the lumbar paraspinals, right>left piriformis, right>left SIJ, right> left gluteal regions.    Segmental joint dysfunction was identified in the following areas using motion palpation and/or pain provocation assessment:  Cervical: C0-C3 (Supine)   Thoracic: T4-T9 (Prone)  Lumbar: L3-L5 (Scoop)  Sacrum: L5/S1 and right SI (Drop)    Today's treatment:  Performed spinal manipulation to the regions of segmental dysfunction identified on examination using age-appropriate and injury specific force, and manual diversified technique. Technique Used: diversified CMT, pelvic drop table technique and manual traction.      Brief Seated IASTM and supine MFR to the L>R neck, upper trapezius and L shoulder region. Side lying MFR, IC to the R lower lumbar paraspinals, R gluteals, R SIJ, R piriformis.         ** No dry needling today    Treatment Plan:   The patient and I discussed the risks and benefits of chiropractic care. Based on the patient's subjective complaints along with the examination findings, it is advised that a course of chiropractic treatment by initiated. Consent for care was given both written and orally by the patient. The patient tolerated today's treatment with little or no additional discomfort and was instructed to contact the office for questions or concerns.     Treatment Frequency: Will see/treat patient every 2-4 weeks as therapeutic benefit is sustained, then frequency will be reduced to as needed for symptom management or as needed for acute flare-ups/exacerbation.     Please note: Voice-to-text software was used when completing this note.  While the note was proofread, portions may include grammatical errors.  Please  contact me with any questions/concerns as it relates to these types of errors.

## 2024-09-11 NOTE — PROGRESS NOTES
Bbbbbv Patient ID: Yoana Herzog is a 69 y.o. female.  Oncology History   Grade 1 follicular lymphoma of lymph nodes of multiple regions (Multi)   2/22/2024 Initial Diagnosis    Grade 1 follicular lymphoma of lymph nodes of multiple regions (CMS/HCC)     2/28/2024 - 3/20/2024 Chemotherapy    RiTUXimab (Weekly), 28 Day Cycle      5/17/2024 -  Chemotherapy    RiTUXimab, 8 Week Cycles - Maintenance       Subjective    HPI  Ms. Yoana Herzog is a 70 y/o F presenting for follow-up for follicular lymphoma.      Blood work 7/18/24 shows LDH, CMP, and CBC all unremarkable.     Patient reports she had a cortisol injection in her knee before a trip this winter and she feels it didn't do a whole lot but she also reports she did a lot of walking and steps on the trip. She received a 2nd injection and got a red face and felt hot, she reports it resolved on it's own with no further issues. She has some upcoming dental work for a tooth with a broken root that she recently took a round of Amoxicillin for. No new lumps or bumps. No new B symptoms. She is able to stay active. No other major complaints at this time.     Patient's past medical history, surgical history, family history and social history reviewed.    Objective    Vitals:     09/13/24 0900   BP: 131/75   Pulse: 69   Resp: 16   Temp: 36.2 °C (97.2 °F)   SpO2: 98%       Review of Systems:   Review of Systems:    Positive per HPI, otherwise negative.    Physical Exam  Gen: appears well in clinic, NAD  HEENT: atraumatic head, normocephalic, EOMI, conjunctiva normal  LUNG: no increased WOB, CTAB  CV: No JVD. RRR  GI: soft, NT, ND  LE: no LE edema  Skin: no obvious rashes or lesions on visible skin  Neuro: interactive, no focal deficits noted  Psych: normal mood and affect  Performance Status:  Symptomatic; fully ambulatory    Labs/Imaging/Pathology: personally reviewed reports and images in Epic electronic medical record system. Pertinent results as it related to the plan represented in below in assessment and plan.   Assessment/Plan   1. Follicular lymphoma grade 1-2 in the right submandibular gland, staging pending     - Patient initially presented to Dr. Douglass from dental after finding a new mass in her right neck.   - We discussed excision biopsy consistent with classic follicular lymphoma grade 1-2.   - We discussed we will plan for a CT C/A/P to complete staging.  - If there is more lymphadenopathy than expected we could consider a PET CT to ensure there is not another area that is more FDG avid.  - Recent CBC and CMP unremarkable.   - I do not suspect bone marrow involvement.   - We will plan for a phone visit on 1/25/24 end of day.   - I will discuss at Tumor Board if there is any role for any radiation therapy.   - RTC for phone visit on 1/25/24 with scans on 1/22/24.      1/25/24: Telephone call     - given some LAD below the neck on recent CT 1/22/23  - discussed at  this AM 1/25/24 and recommended PET/CT  - PET/CT in 2 weeks     2/12/24: Telephone call   - discussed office visit to consider treatment given some increased infections rather than watch and wait approach  - would consider weekly rituximab x4      2/22/24:  - Overall, we discussed given she does have bone  involvement I do recommend treatment.   - We discussed she would be considered stage 4 with her lymphoma and she is having symptoms with bone pain.  - We discussed given she has been minimally asymptomatic we could start with weekly rituximab rather than bendamustine/rituximab as she is very nervous about side effects from traditional chemo. We reviewed side effects and consent signed. We will plan to start Tuesday.  - Labs completed yesterday. Hepatitis B core antibody nonreactive.    - We will plan for a PET CT 7 weeks after her last cycle and follow-up with me 8 weeks post treatment for consideration of maintenance therapy and ensuring response versus addition of chemotherapy.  - RTC with me 8 weeks post.     3/20/2024:   - Presents for 4th and final cycle of weekly Rituxan  - Had reaction with cycle 1  - Had sore throat earlier in the week but urgent care swabbed for multiple viruses which came back negative, patient reports significant improvement of symptoms with allergy medications  - Since she is feeling better and had negative testing and denies fevers/chills/significant fatigue or appetite loss I do feel she can proceed with treatment today if labs are stable and patient agrees with plan as she does not want to delay   - Labs reviewed and stable, patient went on to get treatment as planned   - She is already scheduled to RTC in 8 weeks to review PET scan scheduled in 7 weeks      There are no diagnoses linked to this encounter.     9/13/24:  - No new lumps or bumps.  - No changes in labs with no new cytopenias.  - No new B symptoms.  - We discussed that ultimately her disease appears to be well controlled.   - At this point we will continue maintenance rituximab.  - Will plan for repeat scans in December.  - Follow-up 11/8/24 for treatment only.   - RTC with me in 16 weeks after scans.     Reviewed ongoing medical problems and how they relate to her follicular lymphoma, will continue long term  monitoring.    [facility] 11/8/24 . This note has been transcribed using a medical scribe and there is a possibility of unintentional typing misprints.     Diagnoses and all orders for this visit:  Grade 1 follicular lymphoma of lymph nodes of multiple regions (Multi)  -     Clinic Appointment Request  -     Clinic Appointment Request; Future  -     Infusion Appointment Request; Future  -     CBC and Auto Differential; Future  -     Comprehensive metabolic panel; Future  -     Lactate dehydrogenase; Future  -     Uric Acid; Future  -     CT chest abdomen pelvis w IV contrast; Future  Other orders  -     diphenhydrAMINE (BENADryl) 50 mg in sodium chloride 0.9% 50 mL IV  -     dexAMETHasone (Decadron) 16 mg in dextrose 5% 50 mL IV  -     ondansetron (Zofran) injection 4 mg  -     famotidine PF (Pepcid) injection 40 mg  -     acetaminophen (Tylenol) tablet 650 mg  -     prochlorperazine (Compazine) tablet 10 mg  -     prochlorperazine (Compazine) injection 10 mg  -     riTUXimab-pvvr (Ruxience) 600 mg in sodium chloride 0.9% 310 mL IV  -     sodium chloride 0.9 % bolus 500 mL  -     dextrose 5 % in water (D5W) bolus  -     diphenhydrAMINE (BENADryl) injection 50 mg  -     methylPREDNISolone sod succinate (SOLU-Medrol) 40 mg/mL injection 40 mg  -     famotidine PF (Pepcid) injection 20 mg  -     EPINEPHrine (Epipen) injection syringe 0.3 mg  -     albuterol 2.5 mg /3 mL (0.083 %) nebulizer solution 3 mL         Uma Stevenson MD  Hematology/Oncology  [facility]    Branden Attestation  By signing my name below, IAngela Scribe   attest that this documentation has been prepared under the direction and in the presence of Uma Stevenson MD.     Time Spent  Prep time on day of patient encounter: 5 minutes  Time spent directly with patient, family or caregiver: 15 minutes  Additional Time Spent on Patient Care Activities: 5 minutes  Documentation Time: 5 minutes  Other Time Spent: 0 minutes  Total: 30  minutes

## 2024-09-12 ENCOUNTER — HOSPITAL ENCOUNTER (OUTPATIENT)
Dept: RADIOLOGY | Facility: HOSPITAL | Age: 69
Discharge: HOME | End: 2024-09-12
Payer: MEDICARE

## 2024-09-12 ENCOUNTER — APPOINTMENT (OUTPATIENT)
Dept: HEMATOLOGY/ONCOLOGY | Facility: CLINIC | Age: 69
End: 2024-09-12
Payer: MEDICARE

## 2024-09-12 DIAGNOSIS — Z12.31 ENCOUNTER FOR SCREENING MAMMOGRAM FOR MALIGNANT NEOPLASM OF BREAST: ICD-10-CM

## 2024-09-12 PROCEDURE — 77067 SCR MAMMO BI INCL CAD: CPT

## 2024-09-13 ENCOUNTER — INFUSION (OUTPATIENT)
Dept: HEMATOLOGY/ONCOLOGY | Facility: CLINIC | Age: 69
End: 2024-09-13
Payer: MEDICARE

## 2024-09-13 ENCOUNTER — OFFICE VISIT (OUTPATIENT)
Dept: HEMATOLOGY/ONCOLOGY | Facility: CLINIC | Age: 69
End: 2024-09-13
Payer: MEDICARE

## 2024-09-13 VITALS
WEIGHT: 153.88 LBS | BODY MASS INDEX: 28.15 KG/M2 | RESPIRATION RATE: 16 BRPM | HEART RATE: 69 BPM | TEMPERATURE: 97.2 F | DIASTOLIC BLOOD PRESSURE: 75 MMHG | OXYGEN SATURATION: 98 % | SYSTOLIC BLOOD PRESSURE: 131 MMHG

## 2024-09-13 VITALS
SYSTOLIC BLOOD PRESSURE: 128 MMHG | OXYGEN SATURATION: 94 % | DIASTOLIC BLOOD PRESSURE: 75 MMHG | RESPIRATION RATE: 16 BRPM | TEMPERATURE: 97.3 F | HEART RATE: 62 BPM

## 2024-09-13 DIAGNOSIS — C82.08 GRADE 1 FOLLICULAR LYMPHOMA OF LYMPH NODES OF MULTIPLE REGIONS (MULTI): ICD-10-CM

## 2024-09-13 DIAGNOSIS — C82.08 GRADE 1 FOLLICULAR LYMPHOMA OF LYMPH NODES OF MULTIPLE REGIONS (MULTI): Primary | ICD-10-CM

## 2024-09-13 LAB
ALBUMIN SERPL BCP-MCNC: 4.4 G/DL (ref 3.4–5)
ALP SERPL-CCNC: 62 U/L (ref 33–136)
ALT SERPL W P-5'-P-CCNC: 38 U/L (ref 7–45)
ANION GAP SERPL CALC-SCNC: 12 MMOL/L (ref 10–20)
AST SERPL W P-5'-P-CCNC: 34 U/L (ref 9–39)
BASOPHILS # BLD AUTO: 0.04 X10*3/UL (ref 0–0.1)
BASOPHILS NFR BLD AUTO: 0.7 %
BILIRUB SERPL-MCNC: 0.4 MG/DL (ref 0–1.2)
BUN SERPL-MCNC: 15 MG/DL (ref 6–23)
CALCIUM SERPL-MCNC: 9.7 MG/DL (ref 8.6–10.3)
CHLORIDE SERPL-SCNC: 106 MMOL/L (ref 98–107)
CO2 SERPL-SCNC: 27 MMOL/L (ref 21–32)
CREAT SERPL-MCNC: 0.6 MG/DL (ref 0.5–1.05)
EGFRCR SERPLBLD CKD-EPI 2021: >90 ML/MIN/1.73M*2
EOSINOPHIL # BLD AUTO: 0.1 X10*3/UL (ref 0–0.7)
EOSINOPHIL NFR BLD AUTO: 1.7 %
ERYTHROCYTE [DISTWIDTH] IN BLOOD BY AUTOMATED COUNT: 13.4 % (ref 11.5–14.5)
GLUCOSE SERPL-MCNC: 93 MG/DL (ref 74–99)
HCT VFR BLD AUTO: 41.1 % (ref 36–46)
HGB BLD-MCNC: 13.2 G/DL (ref 12–16)
IMM GRANULOCYTES # BLD AUTO: 0 X10*3/UL (ref 0–0.7)
IMM GRANULOCYTES NFR BLD AUTO: 0 % (ref 0–0.9)
LDH SERPL L TO P-CCNC: 178 U/L (ref 84–246)
LYMPHOCYTES # BLD AUTO: 1.05 X10*3/UL (ref 1.2–4.8)
LYMPHOCYTES NFR BLD AUTO: 17.6 %
MCH RBC QN AUTO: 27.6 PG (ref 26–34)
MCHC RBC AUTO-ENTMCNC: 32.1 G/DL (ref 32–36)
MCV RBC AUTO: 86 FL (ref 80–100)
MONOCYTES # BLD AUTO: 0.56 X10*3/UL (ref 0.1–1)
MONOCYTES NFR BLD AUTO: 9.4 %
NEUTROPHILS # BLD AUTO: 4.21 X10*3/UL (ref 1.2–7.7)
NEUTROPHILS NFR BLD AUTO: 70.6 %
PLATELET # BLD AUTO: 209 X10*3/UL (ref 150–450)
POTASSIUM SERPL-SCNC: 4.1 MMOL/L (ref 3.5–5.3)
PROT SERPL-MCNC: 6.8 G/DL (ref 6.4–8.2)
RBC # BLD AUTO: 4.78 X10*6/UL (ref 4–5.2)
SODIUM SERPL-SCNC: 141 MMOL/L (ref 136–145)
URATE SERPL-MCNC: 3.8 MG/DL (ref 2.3–6.7)
WBC # BLD AUTO: 6 X10*3/UL (ref 4.4–11.3)

## 2024-09-13 PROCEDURE — 85025 COMPLETE CBC W/AUTO DIFF WBC: CPT

## 2024-09-13 PROCEDURE — 83615 LACTATE (LD) (LDH) ENZYME: CPT

## 2024-09-13 PROCEDURE — 99214 OFFICE O/P EST MOD 30 MIN: CPT | Mod: 25 | Performed by: INTERNAL MEDICINE

## 2024-09-13 PROCEDURE — 96413 CHEMO IV INFUSION 1 HR: CPT

## 2024-09-13 PROCEDURE — G2211 COMPLEX E/M VISIT ADD ON: HCPCS | Performed by: INTERNAL MEDICINE

## 2024-09-13 PROCEDURE — 96415 CHEMO IV INFUSION ADDL HR: CPT

## 2024-09-13 PROCEDURE — 2500000004 HC RX 250 GENERAL PHARMACY W/ HCPCS (ALT 636 FOR OP/ED): Mod: JZ | Performed by: INTERNAL MEDICINE

## 2024-09-13 PROCEDURE — 99214 OFFICE O/P EST MOD 30 MIN: CPT | Performed by: INTERNAL MEDICINE

## 2024-09-13 PROCEDURE — 1159F MED LIST DOCD IN RCRD: CPT | Performed by: INTERNAL MEDICINE

## 2024-09-13 PROCEDURE — 84075 ASSAY ALKALINE PHOSPHATASE: CPT

## 2024-09-13 PROCEDURE — 1126F AMNT PAIN NOTED NONE PRSNT: CPT | Performed by: INTERNAL MEDICINE

## 2024-09-13 PROCEDURE — 2500000004 HC RX 250 GENERAL PHARMACY W/ HCPCS (ALT 636 FOR OP/ED): Performed by: INTERNAL MEDICINE

## 2024-09-13 PROCEDURE — 1157F ADVNC CARE PLAN IN RCRD: CPT | Performed by: INTERNAL MEDICINE

## 2024-09-13 PROCEDURE — 96375 TX/PRO/DX INJ NEW DRUG ADDON: CPT | Mod: INF

## 2024-09-13 PROCEDURE — 84550 ASSAY OF BLOOD/URIC ACID: CPT

## 2024-09-13 PROCEDURE — 1123F ACP DISCUSS/DSCN MKR DOCD: CPT | Performed by: INTERNAL MEDICINE

## 2024-09-13 RX ORDER — DIPHENHYDRAMINE HYDROCHLORIDE 50 MG/ML
50 INJECTION INTRAMUSCULAR; INTRAVENOUS AS NEEDED
OUTPATIENT
Start: 2025-01-03

## 2024-09-13 RX ORDER — DIPHENHYDRAMINE HYDROCHLORIDE 50 MG/ML
50 INJECTION INTRAMUSCULAR; INTRAVENOUS AS NEEDED
Status: DISCONTINUED | OUTPATIENT
Start: 2024-09-13 | End: 2024-09-13 | Stop reason: HOSPADM

## 2024-09-13 RX ORDER — FAMOTIDINE 10 MG/ML
20 INJECTION INTRAVENOUS ONCE AS NEEDED
Status: DISCONTINUED | OUTPATIENT
Start: 2024-09-13 | End: 2024-09-13 | Stop reason: HOSPADM

## 2024-09-13 RX ORDER — FAMOTIDINE 10 MG/ML
20 INJECTION INTRAVENOUS ONCE AS NEEDED
OUTPATIENT
Start: 2025-01-03

## 2024-09-13 RX ORDER — FAMOTIDINE 10 MG/ML
40 INJECTION INTRAVENOUS ONCE
OUTPATIENT
Start: 2025-01-03 | End: 2025-01-03

## 2024-09-13 RX ORDER — HEPARIN 100 UNIT/ML
500 SYRINGE INTRAVENOUS AS NEEDED
OUTPATIENT
Start: 2024-09-13

## 2024-09-13 RX ORDER — PROCHLORPERAZINE EDISYLATE 5 MG/ML
10 INJECTION INTRAMUSCULAR; INTRAVENOUS EVERY 6 HOURS PRN
Status: DISCONTINUED | OUTPATIENT
Start: 2024-09-13 | End: 2024-09-13 | Stop reason: HOSPADM

## 2024-09-13 RX ORDER — ONDANSETRON HYDROCHLORIDE 2 MG/ML
4 INJECTION, SOLUTION INTRAVENOUS ONCE
Status: COMPLETED | OUTPATIENT
Start: 2024-09-13 | End: 2024-09-13

## 2024-09-13 RX ORDER — PROCHLORPERAZINE EDISYLATE 5 MG/ML
10 INJECTION INTRAMUSCULAR; INTRAVENOUS EVERY 6 HOURS PRN
OUTPATIENT
Start: 2025-01-03

## 2024-09-13 RX ORDER — FAMOTIDINE 10 MG/ML
40 INJECTION INTRAVENOUS ONCE
Status: COMPLETED | OUTPATIENT
Start: 2024-09-13 | End: 2024-09-13

## 2024-09-13 RX ORDER — HEPARIN SODIUM,PORCINE/PF 10 UNIT/ML
50 SYRINGE (ML) INTRAVENOUS AS NEEDED
OUTPATIENT
Start: 2024-09-13

## 2024-09-13 RX ORDER — ALBUTEROL SULFATE 0.83 MG/ML
3 SOLUTION RESPIRATORY (INHALATION) AS NEEDED
Status: DISCONTINUED | OUTPATIENT
Start: 2024-09-13 | End: 2024-09-13 | Stop reason: HOSPADM

## 2024-09-13 RX ORDER — AMOXICILLIN 500 MG/1
500 CAPSULE ORAL 4 TIMES DAILY
COMMUNITY

## 2024-09-13 RX ORDER — PROCHLORPERAZINE MALEATE 10 MG
10 TABLET ORAL EVERY 6 HOURS PRN
OUTPATIENT
Start: 2025-01-03

## 2024-09-13 RX ORDER — PROCHLORPERAZINE MALEATE 10 MG
10 TABLET ORAL EVERY 6 HOURS PRN
Status: DISCONTINUED | OUTPATIENT
Start: 2024-09-13 | End: 2024-09-13 | Stop reason: HOSPADM

## 2024-09-13 RX ORDER — EPINEPHRINE 0.3 MG/.3ML
0.3 INJECTION SUBCUTANEOUS EVERY 5 MIN PRN
Status: DISCONTINUED | OUTPATIENT
Start: 2024-09-13 | End: 2024-09-13 | Stop reason: HOSPADM

## 2024-09-13 RX ORDER — EPINEPHRINE 0.3 MG/.3ML
0.3 INJECTION SUBCUTANEOUS EVERY 5 MIN PRN
OUTPATIENT
Start: 2025-01-03

## 2024-09-13 RX ORDER — ALBUTEROL SULFATE 0.83 MG/ML
3 SOLUTION RESPIRATORY (INHALATION) AS NEEDED
OUTPATIENT
Start: 2025-01-03

## 2024-09-13 RX ORDER — ACETAMINOPHEN 325 MG/1
650 TABLET ORAL ONCE
OUTPATIENT
Start: 2025-01-03

## 2024-09-13 RX ORDER — ONDANSETRON HYDROCHLORIDE 2 MG/ML
4 INJECTION, SOLUTION INTRAVENOUS ONCE
OUTPATIENT
Start: 2025-01-03 | End: 2025-01-03

## 2024-09-13 RX ORDER — ACETAMINOPHEN 325 MG/1
650 TABLET ORAL ONCE
Status: COMPLETED | OUTPATIENT
Start: 2024-09-13 | End: 2024-09-13

## 2024-09-13 ASSESSMENT — PAIN SCALES - GENERAL: PAINLEVEL: 0-NO PAIN

## 2024-09-13 NOTE — PROGRESS NOTES
Rituximab Standard Infusion Rates:    100mg/hr x30 minutes: 54.5ml/27.3ml    200mg/hr x30 minites: 109ml/54.5ml    300mg/hr x30 minutes: 163.5ml/81.8ml    400mg/hr to complete: 218ml/163.5ml

## 2024-10-07 ENCOUNTER — APPOINTMENT (OUTPATIENT)
Dept: INTEGRATIVE MEDICINE | Facility: CLINIC | Age: 69
End: 2024-10-07
Payer: MEDICARE

## 2024-10-07 DIAGNOSIS — M99.05 SEGMENTAL AND SOMATIC DYSFUNCTION OF PELVIC REGION: ICD-10-CM

## 2024-10-07 DIAGNOSIS — M99.04 SEGMENTAL AND SOMATIC DYSFUNCTION OF SACRAL REGION: ICD-10-CM

## 2024-10-07 DIAGNOSIS — M53.3 SACRAL BACK PAIN: ICD-10-CM

## 2024-10-07 DIAGNOSIS — M54.2 NECK PAIN: ICD-10-CM

## 2024-10-07 DIAGNOSIS — M99.01 SEGMENTAL AND SOMATIC DYSFUNCTION OF CERVICAL REGION: Primary | ICD-10-CM

## 2024-10-07 DIAGNOSIS — M99.02 SEGMENTAL AND SOMATIC DYSFUNCTION OF THORACIC REGION: ICD-10-CM

## 2024-10-07 DIAGNOSIS — M99.00 SEGMENTAL AND SOMATIC DYSFUNCTION OF HEAD REGION: ICD-10-CM

## 2024-10-07 DIAGNOSIS — M99.03 SEGMENTAL AND SOMATIC DYSFUNCTION OF LUMBAR REGION: ICD-10-CM

## 2024-10-07 DIAGNOSIS — M79.9 POSTURAL STRAIN: ICD-10-CM

## 2024-10-07 PROCEDURE — 98942 CHIROPRACTIC MANJ 5 REGIONS: CPT | Performed by: CHIROPRACTOR

## 2024-10-07 NOTE — PROGRESS NOTES
Subjective   Patient ID: Yoana Herzog is a 69 y.o. female who presents October 7, 2024 for chiropractic care.    Medicare (9)    Today, the patient rates their degree of pain as a 4 out of 10 on the numeric pain rating scale.     Yoana Herzog returns for continued chiropractic care. She was previously seen by my colleague, Dr. Palomino. She presents with ongoing neck, upper back, low back and hip stiffness. The patient denies any changes in health since her last encounter and will follow up as scheduled.    ________________________________________________  (SP)INITIAL INTAKE/SUBJECTIVE 12/12/22: She has seen chiropractic in the past. Her last adjustment was about 5 weeks ago. Is not that she is dissatisfied with the care she has been given, but she is hoping that our treatments will help her completely resolve the issue to the point where she does not need regular care.     Today her primary complaint is left neck and shoulder pain. 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 She also enjoys sewing and notices this pain does get worse after she has been doing a lot of sewing. She will occasionally feel tingling into the left hand most notably in the #3 through 5 fingers. She does have some headaches but these are localized to the suboccipital region and do not radiate into the forehead eyes or temples. In the past her physical therapist and chiropractors have done manual stretching of the left shoulder and done adjustments which have helped.     Her secondary pain complaints are in the right hip glued and sacral pains. She does sleep with a pillow between the knees. She is unsure why but the last 6 to 12 months this has been getting progressively worse. She will occasionally have pain in the hamstring region but this is rare. She will occasionally feel sharp intense pain in the hip or the lower back but this only typically  lasts a few seconds. Generally is more of a dull aching pain. The point of max tenderness is localized to the middle of the right butt cheek. No one has worked on this or massage it in the past.      Objective   Physical Exam  Neurological:      General: No focal deficit present.      Mental Status: She is alert and oriented to person, place, and time.      Cranial Nerves: No dysarthria or facial asymmetry.      Sensory: Sensation is intact.      Motor: Motor function is intact.      Coordination: Coordination is intact.      Gait: Gait is intact.       Palpation of the following region(s) revealed:  Cervical: Scalenes bilateral, muscular hypertonicity.  Upper trapezius bilateral, muscular hypertonicity.  Levator scapulae bilateral, muscular hypertonicity.  Cervical paraspinals bilateral, muscular hypertonicity.  Thoracic: Thoracic paraspinals bilateral, muscular hypertonicity.  Middle trapezius bilateral, muscular hypertonicity.  Lower trapezius bilateral, muscular hypertonicity.  Rhomboids bilateral, muscular hypertonicity.  Lumbar: Lumbar paraspinals bilateral, muscular hypertonicity.  Quadratus lumborum bilateral, muscular hypertonicity.  Gluteal bilateral, muscular hypertonicity.    Segmental Joint(s): Segmental joint dysfunction was assessed with motion palpation and is identified in the following areas:  Cervical : C1 C2 C5  Thoracic : T2., T4, T5, and T7  Lumbopelvic / Sacral SIJ : L4, L5/S1, and R SIJ    Assessment/Plan   Today's Treatment Included: Spinal manipulation to the following regions of segmental dysfunction identified on examination, using age-appropriate and injury specific force. Segmental Joint(s) Cervical : C1 C2 C4 Segmental Joint(s) Thoracic : T4, T5, and T6 Segmental Joint(s) Lumbopelvic/Sacral SIJ : L4, L5/S1, R SIJ, and L SIJ  Chiropractic manipulation treatment techniques utilized: Pelvic drop table technique, Pelvic blocking technique, Cervical Manual Traction, and Low force  Soft  tissue mobilization techniques utilized during treatment: IASTM/Graston, Ischemic compression, and Manual Dynamic Stretching    Soft-tissue mobilization was performed in the following areas:   Cervical paraspinal mm. bilateral, Scalenes bilateral, Upper Trapezius bilateral, and Levator Scap. bilateral  Thoracic Paraspinal mm. bilateral, Middle Trapezius bilateral, Lower Trapezius bilateral, and Rhomboids bilateral  Lumbar Paraspinal mm. bilateral and Quadratus Lumborum bilateral    The patient tolerated today's treatment with little or no additional discomfort and was instructed to contact the office for questions or concerns.

## 2024-10-30 ENCOUNTER — APPOINTMENT (OUTPATIENT)
Dept: ORTHOPEDIC SURGERY | Facility: CLINIC | Age: 69
End: 2024-10-30
Payer: MEDICARE

## 2024-10-30 DIAGNOSIS — M17.12 ARTHRITIS OF LEFT KNEE: Primary | ICD-10-CM

## 2024-10-30 PROCEDURE — 99213 OFFICE O/P EST LOW 20 MIN: CPT | Performed by: STUDENT IN AN ORGANIZED HEALTH CARE EDUCATION/TRAINING PROGRAM

## 2024-10-30 PROCEDURE — 1123F ACP DISCUSS/DSCN MKR DOCD: CPT | Performed by: STUDENT IN AN ORGANIZED HEALTH CARE EDUCATION/TRAINING PROGRAM

## 2024-10-30 PROCEDURE — 1159F MED LIST DOCD IN RCRD: CPT | Performed by: STUDENT IN AN ORGANIZED HEALTH CARE EDUCATION/TRAINING PROGRAM

## 2024-10-30 PROCEDURE — 1125F AMNT PAIN NOTED PAIN PRSNT: CPT | Performed by: STUDENT IN AN ORGANIZED HEALTH CARE EDUCATION/TRAINING PROGRAM

## 2024-10-30 PROCEDURE — 1157F ADVNC CARE PLAN IN RCRD: CPT | Performed by: STUDENT IN AN ORGANIZED HEALTH CARE EDUCATION/TRAINING PROGRAM

## 2024-10-30 ASSESSMENT — PAIN - FUNCTIONAL ASSESSMENT: PAIN_FUNCTIONAL_ASSESSMENT: 0-10

## 2024-10-30 ASSESSMENT — PAIN SCALES - GENERAL: PAINLEVEL_OUTOF10: 2

## 2024-11-01 ENCOUNTER — APPOINTMENT (OUTPATIENT)
Dept: HEMATOLOGY/ONCOLOGY | Facility: CLINIC | Age: 69
End: 2024-11-01
Payer: MEDICARE

## 2024-11-08 ENCOUNTER — INFUSION (OUTPATIENT)
Dept: HEMATOLOGY/ONCOLOGY | Facility: CLINIC | Age: 69
End: 2024-11-08
Payer: MEDICARE

## 2024-11-08 VITALS
HEART RATE: 62 BPM | SYSTOLIC BLOOD PRESSURE: 146 MMHG | RESPIRATION RATE: 16 BRPM | TEMPERATURE: 98.1 F | WEIGHT: 153 LBS | OXYGEN SATURATION: 96 % | DIASTOLIC BLOOD PRESSURE: 88 MMHG | BODY MASS INDEX: 27.98 KG/M2

## 2024-11-08 DIAGNOSIS — C82.08 GRADE 1 FOLLICULAR LYMPHOMA OF LYMPH NODES OF MULTIPLE REGIONS (MULTI): ICD-10-CM

## 2024-11-08 LAB
ALBUMIN SERPL BCP-MCNC: 4.4 G/DL (ref 3.4–5)
ALP SERPL-CCNC: 62 U/L (ref 33–136)
ALT SERPL W P-5'-P-CCNC: 16 U/L (ref 7–45)
ANION GAP SERPL CALC-SCNC: 8 MMOL/L (ref 10–20)
AST SERPL W P-5'-P-CCNC: 20 U/L (ref 9–39)
BASOPHILS # BLD AUTO: 0.06 X10*3/UL (ref 0–0.1)
BASOPHILS NFR BLD AUTO: 1.1 %
BILIRUB SERPL-MCNC: 0.6 MG/DL (ref 0–1.2)
BUN SERPL-MCNC: 14 MG/DL (ref 6–23)
CALCIUM SERPL-MCNC: 9.5 MG/DL (ref 8.6–10.3)
CHLORIDE SERPL-SCNC: 103 MMOL/L (ref 98–107)
CO2 SERPL-SCNC: 32 MMOL/L (ref 21–32)
CREAT SERPL-MCNC: 0.61 MG/DL (ref 0.5–1.05)
EGFRCR SERPLBLD CKD-EPI 2021: >90 ML/MIN/1.73M*2
EOSINOPHIL # BLD AUTO: 0.12 X10*3/UL (ref 0–0.7)
EOSINOPHIL NFR BLD AUTO: 2.2 %
ERYTHROCYTE [DISTWIDTH] IN BLOOD BY AUTOMATED COUNT: 13 % (ref 11.5–14.5)
GLUCOSE SERPL-MCNC: 100 MG/DL (ref 74–99)
HCT VFR BLD AUTO: 40.5 % (ref 36–46)
HGB BLD-MCNC: 13 G/DL (ref 12–16)
IMM GRANULOCYTES # BLD AUTO: 0.01 X10*3/UL (ref 0–0.7)
IMM GRANULOCYTES NFR BLD AUTO: 0.2 % (ref 0–0.9)
LDH SERPL L TO P-CCNC: 153 U/L (ref 84–246)
LYMPHOCYTES # BLD AUTO: 0.8 X10*3/UL (ref 1.2–4.8)
LYMPHOCYTES NFR BLD AUTO: 14.8 %
MCH RBC QN AUTO: 27.7 PG (ref 26–34)
MCHC RBC AUTO-ENTMCNC: 32.1 G/DL (ref 32–36)
MCV RBC AUTO: 86 FL (ref 80–100)
MONOCYTES # BLD AUTO: 0.55 X10*3/UL (ref 0.1–1)
MONOCYTES NFR BLD AUTO: 10.1 %
NEUTROPHILS # BLD AUTO: 3.88 X10*3/UL (ref 1.2–7.7)
NEUTROPHILS NFR BLD AUTO: 71.6 %
PLATELET # BLD AUTO: 227 X10*3/UL (ref 150–450)
POTASSIUM SERPL-SCNC: 3.6 MMOL/L (ref 3.5–5.3)
PROT SERPL-MCNC: 6.6 G/DL (ref 6.4–8.2)
RBC # BLD AUTO: 4.69 X10*6/UL (ref 4–5.2)
SODIUM SERPL-SCNC: 139 MMOL/L (ref 136–145)
URATE SERPL-MCNC: 3.5 MG/DL (ref 2.3–6.7)
WBC # BLD AUTO: 5.4 X10*3/UL (ref 4.4–11.3)

## 2024-11-08 PROCEDURE — 96375 TX/PRO/DX INJ NEW DRUG ADDON: CPT | Mod: INF

## 2024-11-08 PROCEDURE — 80053 COMPREHEN METABOLIC PANEL: CPT

## 2024-11-08 PROCEDURE — 84550 ASSAY OF BLOOD/URIC ACID: CPT

## 2024-11-08 PROCEDURE — 96413 CHEMO IV INFUSION 1 HR: CPT

## 2024-11-08 PROCEDURE — 2500000004 HC RX 250 GENERAL PHARMACY W/ HCPCS (ALT 636 FOR OP/ED): Performed by: INTERNAL MEDICINE

## 2024-11-08 PROCEDURE — 83615 LACTATE (LD) (LDH) ENZYME: CPT

## 2024-11-08 PROCEDURE — 2500000001 HC RX 250 WO HCPCS SELF ADMINISTERED DRUGS (ALT 637 FOR MEDICARE OP): Performed by: INTERNAL MEDICINE

## 2024-11-08 PROCEDURE — 85025 COMPLETE CBC W/AUTO DIFF WBC: CPT

## 2024-11-08 PROCEDURE — 96415 CHEMO IV INFUSION ADDL HR: CPT

## 2024-11-08 RX ORDER — PROCHLORPERAZINE EDISYLATE 5 MG/ML
10 INJECTION INTRAMUSCULAR; INTRAVENOUS EVERY 6 HOURS PRN
Status: DISCONTINUED | OUTPATIENT
Start: 2024-11-08 | End: 2024-11-08 | Stop reason: HOSPADM

## 2024-11-08 RX ORDER — DIPHENHYDRAMINE HYDROCHLORIDE 50 MG/ML
50 INJECTION INTRAMUSCULAR; INTRAVENOUS AS NEEDED
Status: DISCONTINUED | OUTPATIENT
Start: 2024-11-08 | End: 2024-11-08 | Stop reason: HOSPADM

## 2024-11-08 RX ORDER — HEPARIN 100 UNIT/ML
500 SYRINGE INTRAVENOUS AS NEEDED
OUTPATIENT
Start: 2024-11-08

## 2024-11-08 RX ORDER — EPINEPHRINE 0.3 MG/.3ML
0.3 INJECTION SUBCUTANEOUS EVERY 5 MIN PRN
Status: DISCONTINUED | OUTPATIENT
Start: 2024-11-08 | End: 2024-11-08 | Stop reason: HOSPADM

## 2024-11-08 RX ORDER — HEPARIN SODIUM,PORCINE/PF 10 UNIT/ML
50 SYRINGE (ML) INTRAVENOUS AS NEEDED
OUTPATIENT
Start: 2024-11-08

## 2024-11-08 RX ORDER — ACETAMINOPHEN 325 MG/1
650 TABLET ORAL ONCE
Status: COMPLETED | OUTPATIENT
Start: 2024-11-08 | End: 2024-11-08

## 2024-11-08 RX ORDER — FAMOTIDINE 10 MG/ML
20 INJECTION INTRAVENOUS ONCE AS NEEDED
Status: DISCONTINUED | OUTPATIENT
Start: 2024-11-08 | End: 2024-11-08 | Stop reason: HOSPADM

## 2024-11-08 RX ORDER — ALBUTEROL SULFATE 0.83 MG/ML
3 SOLUTION RESPIRATORY (INHALATION) AS NEEDED
Status: DISCONTINUED | OUTPATIENT
Start: 2024-11-08 | End: 2024-11-08 | Stop reason: HOSPADM

## 2024-11-08 RX ORDER — FAMOTIDINE 10 MG/ML
40 INJECTION INTRAVENOUS ONCE
Status: COMPLETED | OUTPATIENT
Start: 2024-11-08 | End: 2024-11-08

## 2024-11-08 RX ORDER — PROCHLORPERAZINE MALEATE 10 MG
10 TABLET ORAL EVERY 6 HOURS PRN
Status: DISCONTINUED | OUTPATIENT
Start: 2024-11-08 | End: 2024-11-08 | Stop reason: HOSPADM

## 2024-11-08 RX ORDER — ONDANSETRON HYDROCHLORIDE 2 MG/ML
4 INJECTION, SOLUTION INTRAVENOUS ONCE
Status: COMPLETED | OUTPATIENT
Start: 2024-11-08 | End: 2024-11-08

## 2024-11-08 ASSESSMENT — PAIN SCALES - GENERAL: PAINLEVEL_OUTOF10: 0-NO PAIN

## 2024-11-08 NOTE — PROGRESS NOTES
Rituxan rates 100mg/hr    100mg/hr 54.5ml/hr  over 30 min  27.3 ml    200mg/hr  109ml/hr over 30 min 54.5ml    300mg/hr  163.5ml/hr over 30 min  81.8ml    400mg/hr  218.0ml/hr until done  163.5ml

## 2024-11-11 ENCOUNTER — APPOINTMENT (OUTPATIENT)
Dept: INTEGRATIVE MEDICINE | Facility: CLINIC | Age: 69
End: 2024-11-11
Payer: MEDICARE

## 2024-11-11 DIAGNOSIS — M79.9 POSTURAL STRAIN: ICD-10-CM

## 2024-11-11 DIAGNOSIS — M99.01 SEGMENTAL AND SOMATIC DYSFUNCTION OF CERVICAL REGION: Primary | ICD-10-CM

## 2024-11-11 DIAGNOSIS — M99.03 SEGMENTAL AND SOMATIC DYSFUNCTION OF LUMBAR REGION: ICD-10-CM

## 2024-11-11 DIAGNOSIS — M99.04 SEGMENTAL AND SOMATIC DYSFUNCTION OF SACRAL REGION: ICD-10-CM

## 2024-11-11 DIAGNOSIS — M99.02 SEGMENTAL AND SOMATIC DYSFUNCTION OF THORACIC REGION: ICD-10-CM

## 2024-11-11 DIAGNOSIS — M53.3 SACRAL BACK PAIN: ICD-10-CM

## 2024-11-11 DIAGNOSIS — M54.2 NECK PAIN: ICD-10-CM

## 2024-11-11 PROCEDURE — 98941 CHIROPRACT MANJ 3-4 REGIONS: CPT | Performed by: CHIROPRACTOR

## 2024-11-11 NOTE — PROGRESS NOTES
Subjective   Patient ID: Yoana Herzog is a 69 y.o. female who presents November 11, 2024 for chiropractic care.    Medicare (10)    Today, the patient rates their degree of pain as a 4 out of 10 on the numeric pain rating scale.     Yoana Herzog returns for continued chiropractic care. She presents with ongoing neck, upper back, low back and hip stiffness. The patient denies any changes in health since her last encounter and will follow up as scheduled.    ________________________________________________  (SP)INITIAL INTAKE/SUBJECTIVE 12/12/22: She has seen chiropractic in the past. Her last adjustment was about 5 weeks ago. Is not that she is dissatisfied with the care she has been given, but she is hoping that our treatments will help her completely resolve the issue to the point where she does not need regular care.     Today her primary complaint is left neck and shoulder pain. 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 She also enjoys sewing and notices this pain does get worse after she has been doing a lot of sewing. She will occasionally feel tingling into the left hand most notably in the #3 through 5 fingers. She does have some headaches but these are localized to the suboccipital region and do not radiate into the forehead eyes or temples. In the past her physical therapist and chiropractors have done manual stretching of the left shoulder and done adjustments which have helped.     Her secondary pain complaints are in the right hip glued and sacral pains. She does sleep with a pillow between the knees. She is unsure why but the last 6 to 12 months this has been getting progressively worse. She will occasionally have pain in the hamstring region but this is rare. She will occasionally feel sharp intense pain in the hip or the lower back but this only typically lasts a few seconds. Generally is more of a dull  aching pain. The point of max tenderness is localized to the middle of the right butt cheek. No one has worked on this or massage it in the past.      Objective   Physical Exam  Neurological:      General: No focal deficit present.      Mental Status: She is alert and oriented to person, place, and time.      Cranial Nerves: No dysarthria or facial asymmetry.      Sensory: Sensation is intact.      Motor: Motor function is intact.      Coordination: Coordination is intact.      Gait: Gait is intact.       Palpation of the following region(s) revealed:  Cervical: Scalenes bilateral, muscular hypertonicity.  Upper trapezius bilateral, muscular hypertonicity.  Levator scapulae bilateral, muscular hypertonicity.  Cervical paraspinals bilateral, muscular hypertonicity.  Thoracic: Thoracic paraspinals bilateral, muscular hypertonicity.  Middle trapezius bilateral, muscular hypertonicity.  Lower trapezius bilateral, muscular hypertonicity.  Rhomboids bilateral, muscular hypertonicity.  Lumbar: Lumbar paraspinals bilateral, muscular hypertonicity.  Quadratus lumborum bilateral, muscular hypertonicity.  Gluteal bilateral, muscular hypertonicity.    Segmental Joint(s): Segmental joint dysfunction was assessed with motion palpation and is identified in the following areas:  Cervical : C1 C2 C5  Thoracic : T2., T4, T5, and T7  Lumbopelvic / Sacral SIJ : L4, L5/S1, and R SIJ    Assessment/Plan   Today's Treatment Included: Spinal manipulation to the following regions of segmental dysfunction identified on examination, using age-appropriate and injury specific force. Segmental Joint(s) Cervical : C1 C2 C4 Segmental Joint(s) Thoracic : T4, T5, and T6 Segmental Joint(s) Lumbopelvic/Sacral SIJ : L4, L5/S1, R SIJ, and L SIJ  Chiropractic manipulation treatment techniques utilized: Pelvic drop table technique, Pelvic blocking technique, Cervical Manual Traction, and Low force  Soft tissue mobilization techniques utilized during  treatment: IASTM/Graston, Ischemic compression, and Manual Dynamic Stretching    Soft-tissue mobilization was performed in the following areas:   Cervical paraspinal mm. bilateral, Scalenes bilateral, Upper Trapezius bilateral, and Levator Scap. bilateral  Thoracic Paraspinal mm. bilateral, Middle Trapezius bilateral, Lower Trapezius bilateral, and Rhomboids bilateral  Lumbar Paraspinal mm. bilateral and Quadratus Lumborum bilateral    The patient tolerated today's treatment with little or no additional discomfort and was instructed to contact the office for questions or concerns.

## 2024-11-13 ENCOUNTER — OFFICE VISIT (OUTPATIENT)
Dept: ORTHOPEDIC SURGERY | Facility: CLINIC | Age: 69
End: 2024-11-13
Payer: MEDICARE

## 2024-11-13 DIAGNOSIS — M17.12 ARTHRITIS OF LEFT KNEE: Primary | ICD-10-CM

## 2024-11-13 PROCEDURE — 20610 DRAIN/INJ JOINT/BURSA W/O US: CPT | Mod: LT | Performed by: STUDENT IN AN ORGANIZED HEALTH CARE EDUCATION/TRAINING PROGRAM

## 2024-11-13 PROCEDURE — 1157F ADVNC CARE PLAN IN RCRD: CPT | Performed by: STUDENT IN AN ORGANIZED HEALTH CARE EDUCATION/TRAINING PROGRAM

## 2024-11-13 PROCEDURE — 1123F ACP DISCUSS/DSCN MKR DOCD: CPT | Performed by: STUDENT IN AN ORGANIZED HEALTH CARE EDUCATION/TRAINING PROGRAM

## 2024-11-13 PROCEDURE — 99211 OFF/OP EST MAY X REQ PHY/QHP: CPT | Performed by: STUDENT IN AN ORGANIZED HEALTH CARE EDUCATION/TRAINING PROGRAM

## 2024-11-13 PROCEDURE — 2500000004 HC RX 250 GENERAL PHARMACY W/ HCPCS (ALT 636 FOR OP/ED): Mod: JZ | Performed by: STUDENT IN AN ORGANIZED HEALTH CARE EDUCATION/TRAINING PROGRAM

## 2024-11-13 NOTE — PROGRESS NOTES
History of Present Illness  Patient returns today for an injection with viscosupplementation. The patient endorsing knee pain refractory to cortisone injections and oral medications     Review of Systems   GENERAL: Negative for malaise, significant weight loss, fever  MUSCULOSKELETAL: see HPI  NEURO:  Negative     Exam  Trace effusion  Tenderness over medial and lateral joint line     Assessment:  Patient with known osteoarthritis of the knee     Plan:  Visco injection provided, patient tolerated it well. See procedure note.     Injection performed as detailed in the procedure note below.      PROCEDURE NOTE:  Physician:Devan Hughes III, MD     Prior to the procedure, the patient's allergies were reviewed. The procedure site was marked and time out performed. The procedure team checked for proper functioning of devices and supplies to be used for the procedure. The procedure team reviewed the relevant diagnostic tests pertinent to the procedure. The risks, benefits and anticipated outcomes of the procedure, the risks and benefits of the alternatives to the procedure, and the roles and tasks of the personnel to be involved were discussed with the patient, the patient verbalized understanding and consenting to the procedure.        Procedure details:  The injection site was prepped in the usual sterile manner.   Ethyl chloride mist spray was used to numb the skin.    Euflexxa was injected into the left left knee.  After the injection, a bandage was placed over the injection site.   The patient tolerated the procedure well with no adverse effects.   Post-injection instructions were reviewed with the patient and the patient voiced understanding of these instructions.      L Inj/Asp: L knee on 11/13/2024 1:19 PM  Indications: pain  Details: 21 G needle, anterolateral approach  Medications: 20 mg sodium hyaluronate 10 mg/mL(mw 2.4 -3.6 million)  Outcome: tolerated well, no immediate complications  Procedure, treatment  alternatives, risks and benefits explained, specific risks discussed. Consent was given by the patient. Immediately prior to procedure a time out was called to verify the correct patient, procedure, equipment, support staff and site/side marked as required. Patient was prepped and draped in the usual sterile fashion.

## 2024-11-20 ENCOUNTER — OFFICE VISIT (OUTPATIENT)
Dept: ORTHOPEDIC SURGERY | Facility: CLINIC | Age: 69
End: 2024-11-20
Payer: MEDICARE

## 2024-11-20 VITALS — WEIGHT: 153 LBS | BODY MASS INDEX: 28.16 KG/M2 | HEIGHT: 62 IN

## 2024-11-20 DIAGNOSIS — M17.12 ARTHRITIS OF LEFT KNEE: Primary | ICD-10-CM

## 2024-11-20 PROCEDURE — 99211 OFF/OP EST MAY X REQ PHY/QHP: CPT | Mod: 25 | Performed by: STUDENT IN AN ORGANIZED HEALTH CARE EDUCATION/TRAINING PROGRAM

## 2024-11-20 PROCEDURE — 1159F MED LIST DOCD IN RCRD: CPT | Performed by: STUDENT IN AN ORGANIZED HEALTH CARE EDUCATION/TRAINING PROGRAM

## 2024-11-20 PROCEDURE — 3008F BODY MASS INDEX DOCD: CPT | Performed by: STUDENT IN AN ORGANIZED HEALTH CARE EDUCATION/TRAINING PROGRAM

## 2024-11-20 PROCEDURE — 1157F ADVNC CARE PLAN IN RCRD: CPT | Performed by: STUDENT IN AN ORGANIZED HEALTH CARE EDUCATION/TRAINING PROGRAM

## 2024-11-20 PROCEDURE — 20610 DRAIN/INJ JOINT/BURSA W/O US: CPT | Mod: LT | Performed by: STUDENT IN AN ORGANIZED HEALTH CARE EDUCATION/TRAINING PROGRAM

## 2024-11-20 PROCEDURE — 2500000004 HC RX 250 GENERAL PHARMACY W/ HCPCS (ALT 636 FOR OP/ED): Mod: JZ | Performed by: STUDENT IN AN ORGANIZED HEALTH CARE EDUCATION/TRAINING PROGRAM

## 2024-11-20 PROCEDURE — 1123F ACP DISCUSS/DSCN MKR DOCD: CPT | Performed by: STUDENT IN AN ORGANIZED HEALTH CARE EDUCATION/TRAINING PROGRAM

## 2024-11-20 NOTE — PROGRESS NOTES
History of Present Illness  Patient returns today for an injection with viscosupplementation. The patient endorsing knee pain refractory to cortisone injections and oral medications     Review of Systems   GENERAL: Negative for malaise, significant weight loss, fever  MUSCULOSKELETAL: see HPI  NEURO:  Negative     Exam  Trace effusion  Tenderness over medial and lateral joint line     Assessment:  Patient with known osteoarthritis of the knee     Plan:  Visco injection provided, patient tolerated it well. See procedure note.     Injection performed as detailed in the procedure note below.      PROCEDURE NOTE:  Physician:Devan Hughes III, MD     Prior to the procedure, the patient's allergies were reviewed. The procedure site was marked and time out performed. The procedure team checked for proper functioning of devices and supplies to be used for the procedure. The procedure team reviewed the relevant diagnostic tests pertinent to the procedure. The risks, benefits and anticipated outcomes of the procedure, the risks and benefits of the alternatives to the procedure, and the roles and tasks of the personnel to be involved were discussed with the patient, the patient verbalized understanding and consenting to the procedure.        Procedure details:  The injection site was prepped in the usual sterile manner.   Ethyl chloride mist spray was used to numb the skin.    Euflexxa was injected into the left left knee.  After the injection, a bandage was placed over the injection site.   The patient tolerated the procedure well with no adverse effects.   Post-injection instructions were reviewed with the patient and the patient voiced understanding of these instructions.      L Inj/Asp: L knee on 11/20/2024 4:23 PM  Indications: pain  Details: 21 G needle, anterolateral approach  Medications: 20 mg sodium hyaluronate 10 mg/mL(mw 2.4 -3.6 million)  Outcome: tolerated well, no immediate complications  Procedure, treatment  alternatives, risks and benefits explained, specific risks discussed. Consent was given by the patient. Immediately prior to procedure a time out was called to verify the correct patient, procedure, equipment, support staff and site/side marked as required. Patient was prepped and draped in the usual sterile fashion.

## 2024-11-27 ENCOUNTER — OFFICE VISIT (OUTPATIENT)
Dept: ORTHOPEDIC SURGERY | Facility: CLINIC | Age: 69
End: 2024-11-27
Payer: MEDICARE

## 2024-11-27 DIAGNOSIS — M17.12 ARTHRITIS OF LEFT KNEE: Primary | ICD-10-CM

## 2024-11-27 PROCEDURE — 99211 OFF/OP EST MAY X REQ PHY/QHP: CPT | Mod: 25 | Performed by: STUDENT IN AN ORGANIZED HEALTH CARE EDUCATION/TRAINING PROGRAM

## 2024-11-27 PROCEDURE — 2500000004 HC RX 250 GENERAL PHARMACY W/ HCPCS (ALT 636 FOR OP/ED): Mod: JZ | Performed by: STUDENT IN AN ORGANIZED HEALTH CARE EDUCATION/TRAINING PROGRAM

## 2024-11-27 PROCEDURE — 1123F ACP DISCUSS/DSCN MKR DOCD: CPT | Performed by: STUDENT IN AN ORGANIZED HEALTH CARE EDUCATION/TRAINING PROGRAM

## 2024-11-27 PROCEDURE — 1157F ADVNC CARE PLAN IN RCRD: CPT | Performed by: STUDENT IN AN ORGANIZED HEALTH CARE EDUCATION/TRAINING PROGRAM

## 2024-11-27 PROCEDURE — 20610 DRAIN/INJ JOINT/BURSA W/O US: CPT | Mod: LT | Performed by: STUDENT IN AN ORGANIZED HEALTH CARE EDUCATION/TRAINING PROGRAM

## 2024-11-27 NOTE — PROGRESS NOTES
History of Present Illness  Patient returns today for an injection with viscosupplementation. The patient endorsing knee pain refractory to cortisone injections and oral medications     Review of Systems   GENERAL: Negative for malaise, significant weight loss, fever  MUSCULOSKELETAL: see HPI  NEURO:  Negative     Exam  Trace effusion  Tenderness over medial and lateral joint line     Assessment:  Patient with known osteoarthritis of the knee     Plan:  Visco injection provided, patient tolerated it well. See procedure note.     Injection performed as detailed in the procedure note below.      PROCEDURE NOTE:  Physician:Devan Hughes III, MD     Prior to the procedure, the patient's allergies were reviewed. The procedure site was marked and time out performed. The procedure team checked for proper functioning of devices and supplies to be used for the procedure. The procedure team reviewed the relevant diagnostic tests pertinent to the procedure. The risks, benefits and anticipated outcomes of the procedure, the risks and benefits of the alternatives to the procedure, and the roles and tasks of the personnel to be involved were discussed with the patient, the patient verbalized understanding and consenting to the procedure.        Procedure details:  The injection site was prepped in the usual sterile manner.   Ethyl chloride mist spray was used to numb the skin.    Euflexxa was injected into the left left knee.  After the injection, a bandage was placed over the injection site.   The patient tolerated the procedure well with no adverse effects.   Post-injection instructions were reviewed with the patient and the patient voiced understanding of these instructions.      L Inj/Asp: L knee on 11/27/2024 8:43 AM  Indications: pain  Details: 21 G needle, anterolateral approach  Medications: 20 mg sodium hyaluronate 10 mg/mL(mw 2.4 -3.6 million)  Outcome: tolerated well, no immediate complications  Procedure, treatment  alternatives, risks and benefits explained, specific risks discussed. Consent was given by the patient. Immediately prior to procedure a time out was called to verify the correct patient, procedure, equipment, support staff and site/side marked as required. Patient was prepped and draped in the usual sterile fashion.

## 2024-12-09 ENCOUNTER — APPOINTMENT (OUTPATIENT)
Dept: INTEGRATIVE MEDICINE | Facility: CLINIC | Age: 69
End: 2024-12-09
Payer: MEDICARE

## 2024-12-09 DIAGNOSIS — M99.03 SEGMENTAL AND SOMATIC DYSFUNCTION OF LUMBAR REGION: ICD-10-CM

## 2024-12-09 DIAGNOSIS — M53.3 SACRAL BACK PAIN: ICD-10-CM

## 2024-12-09 DIAGNOSIS — M54.2 NECK PAIN: ICD-10-CM

## 2024-12-09 DIAGNOSIS — M79.9 POSTURAL STRAIN: ICD-10-CM

## 2024-12-09 DIAGNOSIS — M99.01 SEGMENTAL AND SOMATIC DYSFUNCTION OF CERVICAL REGION: Primary | ICD-10-CM

## 2024-12-09 DIAGNOSIS — M99.04 SEGMENTAL AND SOMATIC DYSFUNCTION OF SACRAL REGION: ICD-10-CM

## 2024-12-09 DIAGNOSIS — M79.10 MYALGIA: ICD-10-CM

## 2024-12-09 DIAGNOSIS — M99.02 SEGMENTAL AND SOMATIC DYSFUNCTION OF THORACIC REGION: ICD-10-CM

## 2024-12-09 NOTE — PROGRESS NOTES
Subjective   Patient ID: Yoana Herzog is a 69 y.o. female who presents December 9, 2024 for chiropractic care.    Medicare (11)    Today, the patient rates their degree of pain as a 3 out of 10 on the numeric pain rating scale.     Yoana presents today for continued chiropractic care. She was previously under the care of my colleagues, Dr. Savage and Dr. Palomino. Yoana reports most of her soreness and stiffness today being localized to her neck and shoulders. She is also having stiffness in her low back and hip, although it is improving. The patient denies any changes in health since her last encounter and will follow up as scheduled.    ___________  Per last visit - Dr. Savage (11/11/2024):  Yoana Herzog returns for continued chiropractic care. She presents with ongoing neck, upper back, low back and hip stiffness. The patient denies any changes in health since her last encounter and will follow up as scheduled.    ________________________________________________  (SP)INITIAL INTAKE/SUBJECTIVE 12/12/22: She has seen chiropractic in the past. Her last adjustment was about 5 weeks ago. Is not that she is dissatisfied with the care she has been given, but she is hoping that our treatments will help her completely resolve the issue to the point where she does not need regular care.     Today her primary complaint is left neck and shoulder pain. 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 She also enjoys sewing and notices this pain does get worse after she has been doing a lot of sewing. She will occasionally feel tingling into the left hand most notably in the #3 through 5 fingers. She does have some headaches but these are localized to the suboccipital region and do not radiate into the forehead eyes or temples. In the past her physical therapist and chiropractors have done manual stretching of the left shoulder and done  adjustments which have helped.     Her secondary pain complaints are in the right hip glued and sacral pains. She does sleep with a pillow between the knees. She is unsure why but the last 6 to 12 months this has been getting progressively worse. She will occasionally have pain in the hamstring region but this is rare. She will occasionally feel sharp intense pain in the hip or the lower back but this only typically lasts a few seconds. Generally is more of a dull aching pain. The point of max tenderness is localized to the middle of the right butt cheek. No one has worked on this or massage it in the past.      Objective   Physical Exam  Neurological:      General: No focal deficit present.      Mental Status: She is alert and oriented to person, place, and time.      Cranial Nerves: No dysarthria or facial asymmetry.      Sensory: Sensation is intact.      Motor: Motor function is intact.      Coordination: Coordination is intact.      Gait: Gait is intact.       Palpation of the following region(s) revealed:  Cervical: Scalenes bilateral, muscular hypertonicity.  Upper trapezius bilateral, hypertonicity and tenderness.  Levator scapulae bilateral, muscular hypertonicity.  Cervical paraspinals bilateral, hypertonicity and tenderness.  Thoracic: Thoracic paraspinals bilateral, muscular hypertonicity.  Middle trapezius bilateral, muscular hypertonicity.  Lumbar: Lumbar paraspinals bilateral, muscular hypertonicity.  Quadratus lumborum bilateral, muscular hypertonicity.  Gluteal bilateral, muscular hypertonicity.  Piriformis bilateral, muscular hypertonicity.    Segmental Joint(s): Segmental joint dysfunction was assessed with motion palpation and is identified in the following areas:  Cervical : C2 C3 C6  Thoracic : T3, T4, T8, and T9  Lumbopelvic / Sacral SIJ : L4, L5/S1, R SIJ, and L SIJ    Assessment/Plan   Today's Treatment Included: Spinal manipulation to the following regions of segmental dysfunction  identified on examination, using age-appropriate and injury specific force. Segmental Joint(s) Cervical : C2 C3 C5 Segmental Joint(s) Thoracic : T4, T7, T8, and T9 Segmental Joint(s) Lumbopelvic/Sacral SIJ : L4, L5/S1, R SIJ, and L SIJ  Chiropractic manipulation treatment techniques utilized: Diversified CMT, Pelvic drop table technique, and Flexion-distraction technique  Soft tissue mobilization techniques utilized during treatment: IASTM/Graston and Ischemic compression    Soft-tissue mobilization was performed in the following areas:   Cervical paraspinal mm. bilateral, Scalenes bilateral, Upper Trapezius bilateral, and Levator Scap. bilateral  Thoracic Paraspinal mm. bilateral, Levator Scap. bilateral, Middle Trapezius bilateral, and Lower Trapezius bilateral  Lumbar Paraspinal mm. bilateral, Quadratus Lumborum bilateral, Gluteal mm. Glute. Max.  and Glute. Med. bilateral, and Piriformis bilateral    The patient tolerated today's treatment with little or no additional discomfort and was instructed to contact the office for questions or concerns.

## 2024-12-27 ENCOUNTER — HOSPITAL ENCOUNTER (OUTPATIENT)
Dept: RADIOLOGY | Facility: HOSPITAL | Age: 69
Discharge: HOME | End: 2024-12-27
Payer: MEDICARE

## 2024-12-27 DIAGNOSIS — C82.08 GRADE 1 FOLLICULAR LYMPHOMA OF LYMPH NODES OF MULTIPLE REGIONS (MULTI): ICD-10-CM

## 2024-12-27 PROCEDURE — 2550000001 HC RX 255 CONTRASTS: Performed by: INTERNAL MEDICINE

## 2024-12-27 PROCEDURE — 74177 CT ABD & PELVIS W/CONTRAST: CPT

## 2024-12-29 NOTE — PROGRESS NOTES
Consent:  Verbal consent was requested and obtained from patient on this date for a telehealth visit.    Patient ID: Yoana Herzog is a 69 y.o. female.  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      Ms. Yoana Herzog is a 70 y/o F presenting for follow-up for follicular lymphoma.  Recent CT chest abdomen pelvis 12/27/24 shows no significant interval changes. Stable prominent bilateral axillary lymph nodes. She does have a  hypoattenuating lesion in the uncinate process. No prior MRCP.     The patient is doing well overall. She reports having COVID twice recently, once in September and again after Thanksgiving, both of which she recovered from well. She inquires whether there could be a connection between her infusions and increased susceptibility to infections. Otherwise no new pains or rashes. She is sleeping better. Patient is scheduled for infusion this Friday. However, she would like to hold off due to concerns it may be affecting her teeth.     Patient's past medical history, surgical history, family history and social history reviewed.    Review of Systems:   Review of Systems:    Positive per HPI, otherwise negative.     Objective    There were no vitals taken for this visit.     Physical Exam  Gen: appears well in clinic, NAD  HEENT: atraumatic head, normocephalic, EOMI, conjunctiva normal  LUNG: no increased WOB, CTAB  CV: No JVD. RRR  GI: soft, NT, ND  LE: no LE edema  Skin: no obvious rashes or lesions on visible  skin  Neuro: interactive, no focal deficits noted  Psych: normal mood and affect  Performance Status:  Symptomatic; fully ambulatory  Labs/Imaging/Pathology: personally reviewed reports and images in Epic electronic medical record system. Pertinent results as it related to the plan represented in below in assessment and plan.     Assessment/Plan    1. Follicular lymphoma grade 1-2 in the right submandibular gland, staging pending     - Patient initially presented to Dr. Douglass from dental after finding a new mass in her right neck.   - We discussed excision biopsy consistent with classic follicular lymphoma grade 1-2.   - We discussed we will plan for a CT C/A/P to complete staging.  - If there is more lymphadenopathy than expected we could consider a PET CT to ensure there is not another area that is more FDG avid.  - Recent CBC and CMP unremarkable.   - I do not suspect bone marrow involvement.   - We will plan for a phone visit on 1/25/24 end of day.   - I will discuss at Tumor Board if there is any role for any radiation therapy.   - RTC for phone visit on 1/25/24 with scans on 1/22/24.      1/25/24: Telephone call     - given some LAD below the neck on recent CT 1/22/23  - discussed at  this AM 1/25/24 and recommended PET/CT  - PET/CT in 2 weeks     2/12/24: Telephone call   - discussed office visit to consider treatment given some increased infections rather than watch and wait approach  - would consider weekly rituximab x4      2/22/24:  - Overall, we discussed given she does have bone involvement I do recommend treatment.   - We discussed she would be considered stage 4 with her lymphoma and she is having symptoms with bone pain.  - We discussed given she has been minimally asymptomatic we could start with weekly rituximab rather than bendamustine/rituximab as she is very nervous about side effects from traditional chemo. We reviewed side effects and consent signed. We will plan to start Tuesday.  - Labs  completed yesterday. Hepatitis B core antibody nonreactive.    - We will plan for a PET CT 7 weeks after her last cycle and follow-up with me 8 weeks post treatment for consideration of maintenance therapy and ensuring response versus addition of chemotherapy.  - RTC with me 8 weeks post.     3/20/2024:   - Presents for 4th and final cycle of weekly Rituxan  - Had reaction with cycle 1  - Had sore throat earlier in the week but urgent care swabbed for multiple viruses which came back negative, patient reports significant improvement of symptoms with allergy medications  - Since she is feeling better and had negative testing and denies fevers/chills/significant fatigue or appetite loss I do feel she can proceed with treatment today if labs are stable and patient agrees with plan as she does not want to delay   - Labs reviewed and stable, patient went on to get treatment as planned   - She is already scheduled to RTC in 8 weeks to review PET scan scheduled in 7 weeks      There are no diagnoses linked to this encounter.     9/13/24:  - No new lumps or bumps.  - No changes in labs with no new cytopenias.  - No new B symptoms.  - We discussed that ultimately her disease appears to be well controlled.   - At this point we will continue maintenance rituximab.  - Will plan for repeat scans in December.  - Follow-up 11/8/24 for treatment only.   - RTC with me in 16 weeks after scans.     12/30/24: Telephone visit   - Will hold off on rituximab, given there is concern it is affecting her teeth.   - We also reviewed that maintenance Rituximab does not improve survival but can help with progression free before next line of treatment.  - At this time, she also wishes to hold off.  - Recent CT scan did not show any worsening lymphadenopathy, but it did show IPMN will refer her to pancreatic clinic with Nya Thapa.   - No other new concerning findings or B symptoms   - RTC in three months and at that point we will reconsider  restarting rituximab.     Reviewed ongoing medical problems and how they relate to her follicular lymphoma, will continue long term monitoring.    RTC in end of March. This note has been transcribed using a medical scribe and there is a possibility of unintentional typing misprints.       Diagnoses and all orders for this visit:  IPMN (intraductal papillary mucinous neoplasm)  -     Referral to Gastroenterology; Future  Grade 1 follicular lymphoma of lymph nodes of multiple regions (Multi)  -     Clinic Appointment Request  -     Clinic Appointment Request; Future  -     CBC and Auto Differential; Future  -     Comprehensive metabolic panel; Future  -     Lactate dehydrogenase; Future    Uma Stevenson MD  Hematology/Oncology  [facility]    Scribe Attestation  By signing my name below, I Darleneabhinav Sebastian Scribe attest that this documentation has been prepared under the direction and in the presence of Uma Stevenson MD.    20 min spent on this encounter

## 2024-12-30 ENCOUNTER — TELEMEDICINE (OUTPATIENT)
Dept: HEMATOLOGY/ONCOLOGY | Facility: CLINIC | Age: 69
End: 2024-12-30
Payer: MEDICARE

## 2024-12-30 DIAGNOSIS — D49.0 IPMN (INTRADUCTAL PAPILLARY MUCINOUS NEOPLASM): Primary | ICD-10-CM

## 2024-12-30 DIAGNOSIS — C82.08 GRADE 1 FOLLICULAR LYMPHOMA OF LYMPH NODES OF MULTIPLE REGIONS (MULTI): ICD-10-CM

## 2024-12-30 PROCEDURE — 99213 OFFICE O/P EST LOW 20 MIN: CPT | Performed by: INTERNAL MEDICINE

## 2024-12-30 PROCEDURE — 1123F ACP DISCUSS/DSCN MKR DOCD: CPT | Performed by: INTERNAL MEDICINE

## 2024-12-30 PROCEDURE — 1157F ADVNC CARE PLAN IN RCRD: CPT | Performed by: INTERNAL MEDICINE

## 2025-01-03 ENCOUNTER — APPOINTMENT (OUTPATIENT)
Dept: HEMATOLOGY/ONCOLOGY | Facility: CLINIC | Age: 70
End: 2025-01-03
Payer: MEDICARE

## 2025-01-06 ENCOUNTER — OFFICE VISIT (OUTPATIENT)
Dept: SURGICAL ONCOLOGY | Facility: CLINIC | Age: 70
End: 2025-01-06
Payer: MEDICARE

## 2025-01-06 DIAGNOSIS — D49.0 IPMN (INTRADUCTAL PAPILLARY MUCINOUS NEOPLASM): ICD-10-CM

## 2025-01-06 PROCEDURE — 1123F ACP DISCUSS/DSCN MKR DOCD: CPT | Performed by: PHYSICIAN ASSISTANT

## 2025-01-06 PROCEDURE — 99204 OFFICE O/P NEW MOD 45 MIN: CPT | Performed by: PHYSICIAN ASSISTANT

## 2025-01-06 PROCEDURE — 1157F ADVNC CARE PLAN IN RCRD: CPT | Performed by: PHYSICIAN ASSISTANT

## 2025-01-06 PROCEDURE — 99214 OFFICE O/P EST MOD 30 MIN: CPT | Performed by: PHYSICIAN ASSISTANT

## 2025-01-06 NOTE — PROGRESS NOTES
Subjective   Ms. Herzog is a 69-year-old female who is referred by Dr. 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     She is following with Dr. Stevenson for treatment of follicular lymphoma. As part of that work-up, she had a staging CT Chest/Abdomen/Pelvis on 12/27/24 that demonstrated a stable 2.1 cm hypoattenuating lesion in the uncinate process without MPD dilatation. She was referred here to discuss.    She denies a personal history of acute pancreatitis.     She denies chronic abdominal pain, early satiety, poor appetite, jaundice or unintentional weight loss.     She is taking a break from her rituximab infusions currently as she's been dealing with recurrent dental infections, COVID, etc.     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   Family history: No pancreatitis or pancreatic cancer.   Social history: Non-smoker. Occasional alcohol use. No illicits. She has 12 children. Lives in [place].     Objective     There were no vitals taken for this visit.     Physical Exam  General: in no acute distress, comfortable   Eyes: no pallor or scleral icterus  Respiratory: even, unlabored  Gastrointestinal: non-distended, abdomen soft, non-tender, no masses   Musculoskeletal: normal gate, no deformities   Integumentary: no concerning lesions, no jaundice  Neurologic: no gross deficits  Psychiatric: cognition intact, mood appropriate    Assessment/Plan   Ms. Herzog is a 69-year-old female who is referred by Dr. 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     PLAN: She has a 2.1 cm cyst in the pancreatic uncinate process. 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     I discussed that branch duct IPMNs represent potentially pre-malignant lesions and are managed based on the presence or absence of high risk stigmata or concerning features including cyst size, cyst growth over time, enhancing mural nodule or main duct dilatation. Management decisions are  based on these features, as well as, personal medical history, family history, presence or absence of symptoms and patient preference.     On CT, I see no high risk stigmata or worrisome features. The cyst has been present on imaging for 11 months and has not grown or changed in morphology since that time.     I do recommend a baseline MRI/MRCP and we have scheduled this for late January at [facility] as she requests an open MRI.       Nya Thapa PA-C

## 2025-01-09 ENCOUNTER — APPOINTMENT (OUTPATIENT)
Dept: INTEGRATIVE MEDICINE | Facility: CLINIC | Age: 70
End: 2025-01-09
Payer: MEDICARE

## 2025-01-09 DIAGNOSIS — M54.2 NECK PAIN: ICD-10-CM

## 2025-01-09 DIAGNOSIS — M99.02 SEGMENTAL AND SOMATIC DYSFUNCTION OF THORACIC REGION: ICD-10-CM

## 2025-01-09 DIAGNOSIS — M99.03 SEGMENTAL AND SOMATIC DYSFUNCTION OF LUMBAR REGION: ICD-10-CM

## 2025-01-09 DIAGNOSIS — M53.3 SACRAL BACK PAIN: ICD-10-CM

## 2025-01-09 DIAGNOSIS — M99.01 SEGMENTAL AND SOMATIC DYSFUNCTION OF CERVICAL REGION: Primary | ICD-10-CM

## 2025-01-09 DIAGNOSIS — M79.9 POSTURAL STRAIN: ICD-10-CM

## 2025-01-09 DIAGNOSIS — M99.04 SEGMENTAL AND SOMATIC DYSFUNCTION OF SACRAL REGION: ICD-10-CM

## 2025-01-09 DIAGNOSIS — M79.10 MYALGIA: ICD-10-CM

## 2025-01-09 PROCEDURE — 98941 CHIROPRACT MANJ 3-4 REGIONS: CPT

## 2025-01-09 NOTE — PROGRESS NOTES
Subjective   Patient ID: Yoana Herzog is a 69 y.o. female who presents January 9, 2025 for chiropractic care.    Medicare (1) VPCY    Today, the patient rates their degree of pain as a 3 out of 10 on the numeric pain rating scale.     Yoana returns today for continued chiropractic care. Today, she reports tightness and soreness in her upper back and neck again. She has an infected tooth that is causing pain in the jaw, and she is going to the dentist today to have it evaluated. Everything else has been feeling good. The patient denies any changes in health since her last encounter and will follow up as scheduled.    ________________________________________________  (SP)INITIAL INTAKE/SUBJECTIVE 12/12/22: She has seen chiropractic in the past. Her last adjustment was about 5 weeks ago. Is not that she is dissatisfied with the care she has been given, but she is hoping that our treatments will help her completely resolve the issue to the point where she does not need regular care.     Today her primary complaint is left neck and shoulder pain. 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 She also enjoys sewing and notices this pain does get worse after she has been doing a lot of sewing. She will occasionally feel tingling into the left hand most notably in the #3 through 5 fingers. She does have some headaches but these are localized to the suboccipital region and do not radiate into the forehead eyes or temples. In the past her physical therapist and chiropractors have done manual stretching of the left shoulder and done adjustments which have helped.     Her secondary pain complaints are in the right hip glued and sacral pains. She does sleep with a pillow between the knees. She is unsure why but the last 6 to 12 months this has been getting progressively worse. She will occasionally have pain in the hamstring region but  this is rare. She will occasionally feel sharp intense pain in the hip or the lower back but this only typically lasts a few seconds. Generally is more of a dull aching pain. The point of max tenderness is localized to the middle of the right butt cheek. No one has worked on this or massage it in the past.      Objective   Physical Exam  Neurological:      General: No focal deficit present.      Mental Status: She is alert and oriented to person, place, and time.      Cranial Nerves: No dysarthria or facial asymmetry.      Sensory: Sensation is intact.      Motor: Motor function is intact.      Coordination: Coordination is intact.      Gait: Gait is intact.     Palpation of the following region(s) revealed:  Cervical: Suboccipitals bilateral, muscular hypertonicity.  Scalenes bilateral, muscular hypertonicity.  Upper trapezius bilateral, muscular hypertonicity.  Levator scapulae bilateral, muscular hypertonicity.  Cervical paraspinals bilateral, muscular hypertonicity.  Thoracic: Thoracic paraspinals bilateral, muscular hypertonicity.  Middle trapezius bilateral, muscular hypertonicity.  Lower trapezius bilateral, muscular hypertonicity.  Rhomboids bilateral, muscular hypertonicity.  Lumbar: Lumbar paraspinals bilateral, muscular hypertonicity.  Quadratus lumborum bilateral, muscular hypertonicity.  Gluteal bilateral, muscular hypertonicity.  Piriformis bilateral, muscular hypertonicity.    Segmental Joint(s): Segmental joint dysfunction was assessed with motion palpation and is identified in the following areas:  Cervical : C3 C4 C6  Thoracic : T5, T6, T7, and T8  Lumbopelvic / Sacral SIJ : L3, L4, L5/S1, and R SIJ    Assessment/Plan   Today's Treatment Included: Spinal manipulation to the following regions of segmental dysfunction identified on examination, using age-appropriate and injury specific force. Segmental Joint(s) Cervical : C3 C4 C5 Segmental Joint(s) Thoracic : T5, T6, T7, and T8 Segmental Joint(s)  Lumbopelvic/Sacral SIJ : L3, L4, L5/S1, and R SIJ  Chiropractic manipulation treatment techniques utilized: Diversified CMT, Pelvic drop table technique, and Flexion-distraction technique  Soft tissue mobilization techniques utilized during treatment: Pin and Stretch and Ischemic compression    Soft-tissue mobilization was performed in the following areas:   Suboccipital bilateral, Cervical paraspinal mm. bilateral, Scalenes bilateral, Upper Trapezius bilateral, and Levator Scap. bilateral  Thoracic Paraspinal mm. bilateral, Middle Trapezius bilateral, Lower Trapezius bilateral, and Rhomboids bilateral  Lumbar Paraspinal mm. bilateral, Quadratus Lumborum bilateral, Gluteal mm. Glute. Max.  and Glute. Med. bilateral, and Piriformis bilateral    The patient tolerated today's treatment with little or no additional discomfort and was instructed to contact the office for questions or concerns.

## 2025-01-22 ENCOUNTER — HOSPITAL ENCOUNTER (OUTPATIENT)
Dept: RADIOLOGY | Facility: CLINIC | Age: 70
Discharge: HOME | End: 2025-01-22
Payer: MEDICARE

## 2025-01-22 DIAGNOSIS — D49.0 IPMN (INTRADUCTAL PAPILLARY MUCINOUS NEOPLASM): ICD-10-CM

## 2025-01-22 PROCEDURE — 74183 MRI ABD W/O CNTR FLWD CNTR: CPT

## 2025-01-22 PROCEDURE — 2550000001 HC RX 255 CONTRASTS: Performed by: INTERNAL MEDICINE

## 2025-01-22 PROCEDURE — 76376 3D RENDER W/INTRP POSTPROCES: CPT | Performed by: RADIOLOGY

## 2025-01-22 PROCEDURE — A9575 INJ GADOTERATE MEGLUMI 0.1ML: HCPCS | Performed by: INTERNAL MEDICINE

## 2025-01-22 PROCEDURE — 74183 MRI ABD W/O CNTR FLWD CNTR: CPT | Performed by: RADIOLOGY

## 2025-01-22 RX ORDER — GADOTERATE MEGLUMINE 376.9 MG/ML
13 INJECTION INTRAVENOUS
Status: COMPLETED | OUTPATIENT
Start: 2025-01-22 | End: 2025-01-22

## 2025-01-22 RX ADMIN — GADOTERATE MEGLUMINE 13 ML: 376.9 INJECTION INTRAVENOUS at 10:14

## 2025-01-29 ENCOUNTER — APPOINTMENT (OUTPATIENT)
Dept: ORTHOPEDIC SURGERY | Facility: CLINIC | Age: 70
End: 2025-01-29
Payer: MEDICARE

## 2025-01-29 DIAGNOSIS — M23.92 ACUTE INTERNAL DERANGEMENT OF LEFT KNEE: Primary | ICD-10-CM

## 2025-01-29 PROCEDURE — 1123F ACP DISCUSS/DSCN MKR DOCD: CPT | Performed by: STUDENT IN AN ORGANIZED HEALTH CARE EDUCATION/TRAINING PROGRAM

## 2025-01-29 PROCEDURE — 1157F ADVNC CARE PLAN IN RCRD: CPT | Performed by: STUDENT IN AN ORGANIZED HEALTH CARE EDUCATION/TRAINING PROGRAM

## 2025-01-29 PROCEDURE — 99213 OFFICE O/P EST LOW 20 MIN: CPT | Performed by: STUDENT IN AN ORGANIZED HEALTH CARE EDUCATION/TRAINING PROGRAM

## 2025-01-29 PROCEDURE — 1159F MED LIST DOCD IN RCRD: CPT | Performed by: STUDENT IN AN ORGANIZED HEALTH CARE EDUCATION/TRAINING PROGRAM

## 2025-01-29 NOTE — PROGRESS NOTES
Chief Complaint   Patient presents with    Left Knee - Follow-up     Status post gel injection. Still has pain with activity, but overall doing better     HPI  Yoana is a 69-year-old female presenting today for repeat evaluation of her left knee.  She is status post cortisone injection 8/19/2024.  Patient with known osteoarthritis of the knee.  The patient notes persistent and ongoing left knee pain for quite some time.  The patient has tried the following modalities activity modifications, physician guided at home exercises, oral anti-inflammatories, intra-articular cortisone injections.  \    Presents today for follow-up status post gel injection therapy.  Has had recent issues with infections about her mouth requiring multiple surgeries.  This has kept her occupied.  Gel injection did provide short course of relief however still has some pain and discomfort about her knee     Of note patient does have follicular lymphoma which she states is in remission.    Past Medical History:   Diagnosis Date    Lymphoma September 2023    Pain in unspecified knee     Knee pain    Pain in unspecified shoulder     Shoulder pain       Medication Documentation Review Audit       Reviewed by Susana Schuster MA (Medical Assistant) on 01/29/25 at 0846      Medication Order Taking? Sig Documenting Provider Last Dose Status   amoxicillin (Amoxil) 500 mg capsule [id number] No Take 1 capsule (500 mg) by mouth 4 times a day. Historical Provider, MD Taking Active   ascorbic acid (Vitamin C) 500 mg tablet [id number] No Take 1 tablet (500 mg) by mouth once daily. Historical Provider, MD Taking Active   calcium carbonate (Oscal) 500 mg calcium (1,250 mg) tablet [id number] No Take 1 tablet (1,250 mg) by mouth 2 times daily (morning and late afternoon). Historical Provider, MD Taking Active   cetirizine (ZyrTEC) 10 mg tablet [id number] No Take 1 tablet (10 mg) by mouth once daily as needed. Historical Provider, MD Taking Active   cholecalciferol  (Vitamin D-3) 25 MCG (1000 UT) tablet [id number] No Take 1 tablet (1,000 Units) by mouth once daily. Historical Provider, MD Taking Active   clocortolone pivalate (Cloderm) 0.1 % cream [id number] No if needed. Historical Provider, MD Taking Active   fluticasone (Flonase Allergy Relief) 50 mcg/actuation nasal spray [id number] No Administer into affected nostril(s) if needed. Historical Provider, MD Taking Active   ivermectin (Soolantra) 1 % cream [id number] No Apply topically once daily. Historical Provider, MD Taking Active   magnesium oxide (Mag-Ox) 400 mg tablet [id number] No Take 1 tablet (400 mg) by mouth once daily. Historical Provider, MD Taking Active   multivitamin tablet [id number] No Take 1 tablet by mouth once daily. Historical Provider, MD Taking Active                    Allergies   Allergen Reactions    Amoxicillin-Pot Clavulanate GI Upset and Nausea/vomiting    Cortisone Other     Pt had her knee injected with cortisone and the next day she was flushed in the face.    Bee Pollen Itching, Headache and Runny nose    House Dust Itching    House Dust Mite Itching and Runny nose    Rituxan [Rituximab] Itching     Pt recovered after receiving pepcid, benadryl and methylprednisone       Social History     Socioeconomic History    Marital status:      Spouse name: Not on file    Number of children: Not on file    Years of education: Not on file    Highest education level: Not on file   Occupational History    Not on file   Tobacco Use    Smoking status: Never    Smokeless tobacco: Current   Vaping Use    Vaping status: Never Used   Substance and Sexual Activity    Alcohol use: Yes     Alcohol/week: 1.0 standard drink of alcohol     Types: 1 Glasses of wine per week     Comment: occasional    Drug use: Never    Sexual activity: Yes     Partners: Male     Birth control/protection: Female Sterilization     Comment: I do NOT have partners, ONLY MY .   Other Topics Concern    Not on file   Social History  Narrative    Not on file     Social Drivers of Health     Financial Resource Strain: Not on file   Food Insecurity: Not on file   Transportation Needs: Not on file   Physical Activity: Not on file   Stress: Not on file   Social Connections: Not on file   Intimate Partner Violence: Not on file   Housing Stability: Not on file       Past Surgical History:   Procedure Laterality Date    HYSTERECTOMY  04/09/2018    Hysterectomy    KNEE SURGERY  04/09/2018    Knee Surgery    NECK SURGERY  12/20/2023    lesion neck sx    TONSILLECTOMY  04/09/2018    Tonsillectomy            Review of Systems   GENERAL: Negative for malaise, significant weight loss, fever  MUSCULOSKELETAL: see HPI  NEURO:  Negative    There is no height or weight on file to calculate BMI.      Exam  Left knee:  Skin healthy and intact  No gross swelling or ecchymosis  Alignment: Neutral  Effusion: Mild  ROM: 0-1 10  Crepitance with range of motion  No pain with internal rotation of the hip  Tenderness to palpation: Medial lateral joint line and with patellar compression     No laxity to valgus stress  No laxity to varus stress  Negative Lachman´s test  Negative posterior drawer test  Mild pain with Brionna´s test     Neurovascular exam normal distally  2+ DP pulse and good cap refill       Imaging  Narrative & Impression   Interpreted By:  Serge Resendiz,   STUDY:  XR KNEE LEFT 4+ VIEWS; ;  6/26/2024 8:34 am      INDICATION:  Signs/Symptoms:pain.      COMPARISON:  August 16, 2019 and March 10, 2022 radiographs.      ACCESSION NUMBER(S):  [id number]      ORDERING CLINICIAN:  KANDACE CADENA      FINDINGS:  Mild-to-moderate tricompartmental osteoarthrosis most pronounced  lateral patellar compartment. Moderate chondrocalcinosis and probable  small effusion. No fracture or suspicious osseous lesion.      IMPRESSION:  Mild-to-moderate osteoarthrosis with chondrocalcinosis and probable  small effusion.          MACRO:  None      Signed by: Serge Resendiz  6/27/2024 10:20 AM  Dictation workstation:   [id number]       Procedures    Assessment  Patient with moderate left knee osteoarthritis     Plan  Given constellation of findings I do recommend that proceed with MRI to further characterize arthritis in the knee as well as for evaluation of internal derangement.  Given current remission with follicular lymphoma and history of multiple infections about her teeth really wish to avoid any type of total knee arthroplasty given risk of complication infection and loosening.  Will proceed with an MRI for further evaluation if she is a candidate for knee arthroscopy  The history and clinical exam are consistent with intraarticular pathology and therefore MRI is medically indicated to evaluate the soft tissues of the joint. Follow up in one week or after completion of the MRI to advance management accordingly  The patient understands and agrees with the plan.  Discussed activities to avoid as well as importance of using pain as a guide

## 2025-02-10 ENCOUNTER — APPOINTMENT (OUTPATIENT)
Dept: INTEGRATIVE MEDICINE | Facility: CLINIC | Age: 70
End: 2025-02-10
Payer: MEDICARE

## 2025-02-10 DIAGNOSIS — M53.3 SACRAL BACK PAIN: ICD-10-CM

## 2025-02-10 DIAGNOSIS — M99.04 SEGMENTAL AND SOMATIC DYSFUNCTION OF SACRAL REGION: ICD-10-CM

## 2025-02-10 DIAGNOSIS — M79.9 POSTURAL STRAIN: ICD-10-CM

## 2025-02-10 DIAGNOSIS — M99.03 SEGMENTAL AND SOMATIC DYSFUNCTION OF LUMBAR REGION: ICD-10-CM

## 2025-02-10 DIAGNOSIS — M79.10 MYALGIA: ICD-10-CM

## 2025-02-10 DIAGNOSIS — M99.01 SEGMENTAL AND SOMATIC DYSFUNCTION OF CERVICAL REGION: Primary | ICD-10-CM

## 2025-02-10 DIAGNOSIS — M99.02 SEGMENTAL AND SOMATIC DYSFUNCTION OF THORACIC REGION: ICD-10-CM

## 2025-02-10 DIAGNOSIS — M54.2 NECK PAIN: ICD-10-CM

## 2025-02-10 PROCEDURE — 98941 CHIROPRACT MANJ 3-4 REGIONS: CPT

## 2025-02-10 NOTE — PROGRESS NOTES
Subjective   Patient ID: Yoana Herzog is a 69 y.o. female who presents February 10, 2025 for chiropractic care.    Medicare (2) VPCY    Today, the patient rates their degree of pain as a 3 out of 10 on the numeric pain rating scale.     Yoana has been feeling better, her jaw is not bothering her anymore and she just has some mild tightness in her upper back, neck, and low back that we addressed at today's visit.   ________________________________________________  (SP)INITIAL INTAKE/SUBJECTIVE 12/12/22: She has seen chiropractic in the past. Her last adjustment was about 5 weeks ago. Is not that she is dissatisfied with the care she has been given, but she is hoping that our treatments will help her completely resolve the issue to the point where she does not need regular care.     Today her primary complaint is left neck and shoulder pain. 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 She also enjoys sewing and notices this pain does get worse after she has been doing a lot of sewing. She will occasionally feel tingling into the left hand most notably in the #3 through 5 fingers. She does have some headaches but these are localized to the suboccipital region and do not radiate into the forehead eyes or temples. In the past her physical therapist and chiropractors have done manual stretching of the left shoulder and done adjustments which have helped.     Her secondary pain complaints are in the right hip glued and sacral pains. She does sleep with a pillow between the knees. She is unsure why but the last 6 to 12 months this has been getting progressively worse. She will occasionally have pain in the hamstring region but this is rare. She will occasionally feel sharp intense pain in the hip or the lower back but this only typically lasts a few seconds. Generally is more of a dull aching pain. The point of max tenderness is  localized to the middle of the right butt cheek. No one has worked on this or massage it in the past.      Objective   Physical Exam  Neurological:      General: No focal deficit present.      Mental Status: She is alert and oriented to person, place, and time.      Cranial Nerves: No dysarthria or facial asymmetry.      Sensory: Sensation is intact.      Motor: Motor function is intact.      Coordination: Coordination is intact.      Gait: Gait is intact.     Palpation of the following region(s) revealed:  Cervical: Suboccipitals bilateral, muscular hypertonicity.  Scalenes bilateral, muscular hypertonicity.  Upper trapezius bilateral, muscular hypertonicity.  Levator scapulae bilateral, muscular hypertonicity.  Cervical paraspinals bilateral, muscular hypertonicity.  Thoracic: Thoracic paraspinals bilateral, muscular hypertonicity.  Middle trapezius bilateral, muscular hypertonicity.  Lower trapezius bilateral, muscular hypertonicity.  Rhomboids bilateral, muscular hypertonicity.  Lumbar: Lumbar paraspinals bilateral, muscular hypertonicity.  Quadratus lumborum bilateral, muscular hypertonicity.  Gluteal bilateral, muscular hypertonicity.  Piriformis bilateral, muscular hypertonicity.    Segmental Joint(s): Segmental joint dysfunction was assessed with motion palpation and is identified in the following areas:  Cervical : C2 C6  Thoracic : T3, T6, T7, and T8  Lumbopelvic / Sacral SIJ : L3, L4, L5/S1, and R SIJ    Assessment/Plan   Today's Treatment Included: Spinal manipulation to the following regions of segmental dysfunction identified on examination, using age-appropriate and injury specific force. Segmental Joint(s) Cervical : C2 C6 Segmental Joint(s) Thoracic : T3, T6, T7, and T8 Segmental Joint(s) Lumbopelvic/Sacral SIJ : L3, L4, L5/S1, and R SIJ  Chiropractic manipulation treatment techniques utilized: Diversified CMT, Pelvic drop table technique, and Flexion-distraction technique  Soft tissue mobilization  techniques utilized during treatment: Pin and Stretch and Ischemic compression    Soft-tissue mobilization was performed in the following areas:   Suboccipital bilateral, Cervical paraspinal mm. bilateral, Scalenes bilateral, Upper Trapezius bilateral, and Levator Scap. bilateral  Thoracic Paraspinal mm. bilateral, Middle Trapezius bilateral, Lower Trapezius bilateral, and Rhomboids bilateral  Lumbar Paraspinal mm. bilateral, Quadratus Lumborum bilateral, Gluteal mm. Glute. Max.  and Glute. Med. bilateral, and Piriformis bilateral    The patient tolerated today's treatment with little or no additional discomfort and was instructed to contact the office for questions or concerns.

## 2025-02-25 ENCOUNTER — HOSPITAL ENCOUNTER (OUTPATIENT)
Dept: RADIOLOGY | Facility: HOSPITAL | Age: 70
Discharge: HOME | End: 2025-02-25
Payer: MEDICARE

## 2025-02-25 DIAGNOSIS — M23.92 ACUTE INTERNAL DERANGEMENT OF LEFT KNEE: ICD-10-CM

## 2025-02-25 PROCEDURE — 73721 MRI JNT OF LWR EXTRE W/O DYE: CPT | Mod: LT

## 2025-03-05 ENCOUNTER — APPOINTMENT (OUTPATIENT)
Dept: ORTHOPEDIC SURGERY | Facility: CLINIC | Age: 70
End: 2025-03-05
Payer: MEDICARE

## 2025-03-05 DIAGNOSIS — M23.92 ACUTE INTERNAL DERANGEMENT OF LEFT KNEE: Primary | ICD-10-CM

## 2025-03-05 DIAGNOSIS — S83.272D COMPLEX TEAR OF LATERAL MENISCUS OF LEFT KNEE AS CURRENT INJURY, SUBSEQUENT ENCOUNTER: ICD-10-CM

## 2025-03-05 PROCEDURE — 1123F ACP DISCUSS/DSCN MKR DOCD: CPT | Performed by: STUDENT IN AN ORGANIZED HEALTH CARE EDUCATION/TRAINING PROGRAM

## 2025-03-05 PROCEDURE — 1159F MED LIST DOCD IN RCRD: CPT | Performed by: STUDENT IN AN ORGANIZED HEALTH CARE EDUCATION/TRAINING PROGRAM

## 2025-03-05 PROCEDURE — 99214 OFFICE O/P EST MOD 30 MIN: CPT | Performed by: STUDENT IN AN ORGANIZED HEALTH CARE EDUCATION/TRAINING PROGRAM

## 2025-03-05 PROCEDURE — 1157F ADVNC CARE PLAN IN RCRD: CPT | Performed by: STUDENT IN AN ORGANIZED HEALTH CARE EDUCATION/TRAINING PROGRAM

## 2025-03-05 NOTE — PROGRESS NOTES
Chief Complaint   Patient presents with    Left Knee - Results     MRI results     HPI  Yoana is a 69-year-old female presenting today for repeat evaluation of her left knee.  She is status post cortisone injection 8/19/2024.  Patient with known osteoarthritis of the knee.  The patient notes persistent and ongoing left knee pain for quite some time.  The patient has tried the following modalities activity modifications, physician guided at home exercises, oral anti-inflammatories, intra-articular cortisone injections.  \      Has had recent issues with infections about her mouth requiring multiple surgeries.  This has kept her occupied.  Gel injection did provide short course of relief however still has some pain and discomfort about her knee     Of note patient does have follicular lymphoma which she states is in remission.    Presents today for discussion of MRI results.    Past Medical History:   Diagnosis Date    Lymphoma September 2023    Pain in unspecified knee     Knee pain    Pain in unspecified shoulder     Shoulder pain       Medication Documentation Review Audit       Reviewed by Susana Schuster MA (Medical Assistant) on 03/05/25 at 0847      Medication Order Taking? Sig Documenting Provider Last Dose Status   amoxicillin (Amoxil) 500 mg capsule [id number] No Take 1 capsule (500 mg) by mouth 4 times a day. Historical Provider, MD Taking Active   ascorbic acid (Vitamin C) 500 mg tablet [id number] No Take 1 tablet (500 mg) by mouth once daily. Historical Provider, MD Taking Active   calcium carbonate (Oscal) 500 mg calcium (1,250 mg) tablet [id number] No Take 1 tablet (1,250 mg) by mouth 2 times daily (morning and late afternoon). Historical Provider, MD Taking Active   cetirizine (ZyrTEC) 10 mg tablet [id number] No Take 1 tablet (10 mg) by mouth once daily as needed. Historical Provider, MD Taking Active   cholecalciferol (Vitamin D-3) 25 MCG (1000 UT) tablet [id number] No Take 1 tablet (1,000 Units) by mouth  once daily. Historical Provider, MD Taking Active   clocortolone pivalate (Cloderm) 0.1 % cream [id number] No if needed. Historical Provider, MD Taking Active   fluticasone (Flonase Allergy Relief) 50 mcg/actuation nasal spray [id number] No Administer into affected nostril(s) if needed. Historical Provider, MD Taking Active   ivermectin (Soolantra) 1 % cream [id number] No Apply topically once daily. Historical Provider, MD Taking Active   magnesium oxide (Mag-Ox) 400 mg tablet [id number] No Take 1 tablet (400 mg) by mouth once daily. Historical Provider, MD Taking Active   multivitamin tablet [id number] No Take 1 tablet by mouth once daily. Historical Provider, MD Taking Active                    Allergies   Allergen Reactions    Amoxicillin-Pot Clavulanate GI Upset and Nausea/vomiting    Cortisone Other     Pt had her knee injected with cortisone and the next day she was flushed in the face.    Bee Pollen Itching, Headache and Runny nose    House Dust Itching    House Dust Mite Itching and Runny nose    Rituxan [Rituximab] Itching     Pt recovered after receiving pepcid, benadryl and methylprednisone       Social History     Socioeconomic History    Marital status:      Spouse name: Not on file    Number of children: Not on file    Years of education: Not on file    Highest education level: Not on file   Occupational History    Not on file   Tobacco Use    Smoking status: Never    Smokeless tobacco: Current   Vaping Use    Vaping status: Never Used   Substance and Sexual Activity    Alcohol use: Yes     Alcohol/week: 1.0 standard drink of alcohol     Types: 1 Glasses of wine per week     Comment: occasional    Drug use: Never    Sexual activity: Yes     Partners: Male     Birth control/protection: Female Sterilization     Comment: I do NOT have partners, ONLY MY .   Other Topics Concern    Not on file   Social History Narrative    Not on file     Social Drivers of Health     Financial Resource Strain: Not  on file   Food Insecurity: Not on file   Transportation Needs: Not on file   Physical Activity: Not on file   Stress: Not on file   Social Connections: Not on file   Intimate Partner Violence: Not on file   Housing Stability: Not on file       Past Surgical History:   Procedure Laterality Date    HYSTERECTOMY  04/09/2018    Hysterectomy    KNEE SURGERY  04/09/2018    Knee Surgery    NECK SURGERY  12/20/2023    lesion neck sx    TONSILLECTOMY  04/09/2018    Tonsillectomy            Review of Systems   GENERAL: Negative for malaise, significant weight loss, fever  MUSCULOSKELETAL: see HPI  NEURO:  Negative    There is no height or weight on file to calculate BMI.      Exam  Left knee:  Skin healthy and intact  No gross swelling or ecchymosis  Alignment: Neutral  Effusion: Mild  ROM: 0-1 10  Crepitance with range of motion  No pain with internal rotation of the hip  Tenderness to palpation: Medial lateral joint line and with patellar compression     No laxity to valgus stress  No laxity to varus stress  Negative Lachman´s test  Negative posterior drawer test  Mild pain with Brionna´s test     Neurovascular exam normal distally  2+ DP pulse and good cap refill       Imaging  Narrative & Impression   Interpreted By:  Serge Resendiz,   STUDY:  XR KNEE LEFT 4+ VIEWS; ;  6/26/2024 8:34 am      INDICATION:  Signs/Symptoms:pain.      COMPARISON:  August 16, 2019 and March 10, 2022 radiographs.      ACCESSION NUMBER(S):  [id number]      ORDERING CLINICIAN:  KANDACE CADENA      FINDINGS:  Mild-to-moderate tricompartmental osteoarthrosis most pronounced  lateral patellar compartment. Moderate chondrocalcinosis and probable  small effusion. No fracture or suspicious osseous lesion.      IMPRESSION:  Mild-to-moderate osteoarthrosis with chondrocalcinosis and probable  small effusion.          MACRO:  None      Signed by: Serge Resendiz 6/27/2024 10:20 AM  Dictation workstation:   [id number]        Procedures    Assessment  Patient with moderate left knee osteoarthritis, medial lateral meniscus tears     Plan  We discussed MRI findings today discussed multiple forms of treatment to include conservative and surgical treatments she is already attempted activity modifications anti-inflammatories cortisone and gel injections with little to no relief knee pain is currently debilitated in nature and preventing her from activities that she enjoys.  Because of her knee pain she really has been not using and doing activities.  Discussed that she does have some arthritis on MRI as well as meniscus tears I do think that she has pain contributing from both of these pathologies.  She does wish to avoid total joint replacement at this point in time does wish to proceed with diagnostic knee arthroscopy partial medial lateral meniscectomy discussed that she may continue to have arthritic type pain about her knee.    We discussed the options of operative versus nonoperative management with the attendant risks and benefits and the patient is interested in surgical treatment. I have discussed the alternatives of continued nonoperative treatment with observation, physical therapy, and / or medication versus surgery with the patient and we have thoughtfully considered these options. The patient wishes to proceed with surgical treatment.     We will proceed with left knee diagnostic arthroscopy and partial medial lateral meniscectomy    The planned surgical procedure includes examination under anesthesia. Anesthetic risks will be discussed with the patient by the anesthetist. Arthroscopic evaluation will be performed of the knee joint.  Then an arthroscopic procedure will be performed which may include debridement or repair of the the meniscus or cartilage, synovectomy, and removal of loose bodies.  In addition, if there are advanced degenerative changes I have advised the patient that this may indeed be a progressive  process, ultimately resulting in further pain at some point in the future.    Risks of the procedure include but are not limited to infection, bleeding, persistent pain, compartment syndrome, neurologic injury, pressure sores or burns related to positioning or equipment, failure of repair if performed, weakness, arthrofibrosis or stiffness of the joint, limited function and/or limited use of the extremity. The rare complication of death was discussed with the patient. Also, the possible need for revision surgery was discussed.     All this was discussed with the patient and consent obtained. All questions were answered. The patient understands and will consider the surgical options with the above risks in mind. If repair is performed of the meniscus a brace may be provided as  part of a treatment plan which will include wearing the immobilizer at all times.    Regarding DVT prophylaxis, recommend generalized ambulation, patient is low risk for DVT.    We will provide a prescription for Norco 5/325 15  tabs the day prior to surgery.  The patient will pick this up today before surgery in anticipation for surgery/postoperative pain control.

## 2025-03-06 ENCOUNTER — TELEPHONE (OUTPATIENT)
Dept: ORTHOPEDIC SURGERY | Facility: CLINIC | Age: 70
End: 2025-03-06
Payer: MEDICARE

## 2025-03-06 NOTE — TELEPHONE ENCOUNTER
"   Recent Vitals     11/8/2024  0934 11/20/2024  1416 1/22/2025  0944 Most Recent Value   Weight: 69.4 kg (153 lb) 69.4 kg (153 lb) 69.9 kg (154 lb) 69.9 kg (154 lb)  as of 1/22/2025   Body Mass Index:    28.17 kg/m² Abnormal   1.575 m (5' 2\")  as of 11/20/2024  69.9 kg (154 lb)  as of 1/22/2025   BP: 146/88 -- -- 146/88  as of 11/8/2024   Heart Rate: 62 -- -- 62  as of 11/8/2024   Resp: 16 -- -- 16  as of 11/8/2024   Temp: 36.7 °C (98.1 °F) -- -- 36.7 °C (98.1 °F)  as of 11/8/2024   SpO2: 96 % -- -- 96%  as of 11/8/2024   Height: -- 1.575 m (5' 2\") -- 1.575 m (5' 2\")  as of 11/20/2024   Peak Flow: -- -- -- Not taken   LMP:    None   Body Surface Area:    1.75 m²  1.575 m (5' 2\")  as of 11/20/2024  69.9 kg (154 lb)  as of 1/22/2025   Adjusted Weight:    58 kg (127 lb 13.9 oz)  Actual Weight:  69.9 kg (154 lb)  as of 1/22/2025  Ideal Weight:  50.1 kg (110 lb 7.2 oz)  as of 11/20/2024   Dosing Weight:    None     "

## 2025-03-11 ENCOUNTER — APPOINTMENT (OUTPATIENT)
Dept: INTEGRATIVE MEDICINE | Facility: CLINIC | Age: 70
End: 2025-03-11
Payer: MEDICARE

## 2025-03-11 DIAGNOSIS — M79.10 MYALGIA: ICD-10-CM

## 2025-03-11 DIAGNOSIS — M99.04 SEGMENTAL AND SOMATIC DYSFUNCTION OF SACRAL REGION: ICD-10-CM

## 2025-03-11 DIAGNOSIS — M99.02 SEGMENTAL AND SOMATIC DYSFUNCTION OF THORACIC REGION: ICD-10-CM

## 2025-03-11 DIAGNOSIS — M53.3 SACRAL BACK PAIN: ICD-10-CM

## 2025-03-11 DIAGNOSIS — M99.01 SEGMENTAL AND SOMATIC DYSFUNCTION OF CERVICAL REGION: Primary | ICD-10-CM

## 2025-03-11 DIAGNOSIS — M99.03 SEGMENTAL AND SOMATIC DYSFUNCTION OF LUMBAR REGION: ICD-10-CM

## 2025-03-11 DIAGNOSIS — M79.9 POSTURAL STRAIN: ICD-10-CM

## 2025-03-11 DIAGNOSIS — M54.2 NECK PAIN: ICD-10-CM

## 2025-03-11 PROCEDURE — 98941 CHIROPRACT MANJ 3-4 REGIONS: CPT

## 2025-03-11 NOTE — PROGRESS NOTES
Subjective   Patient ID: Yoana Herzog is a 69 y.o. female who presents March 11, 2025 for chiropractic care.    Medicare (3) VPCY    Today, the patient rates their degree of pain as a 3 out of 10 on the numeric pain rating scale.     Yoana is noticing tension in same areas as usual, neck, upper back, and low back. We spent time in each of these areas today. She is scheduled for a knee laparoscopic procedure in April to clean up some arthritis and she will follow up prior to this procedure.   ________________________________________________  (SP)INITIAL INTAKE/SUBJECTIVE 12/12/22: She has seen chiropractic in the past. Her last adjustment was about 5 weeks ago. Is not that she is dissatisfied with the care she has been given, but she is hoping that our treatments will help her completely resolve the issue to the point where she does not need regular care.     Today her primary complaint is left neck and shoulder pain. 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 She also enjoys sewing and notices this pain does get worse after she has been doing a lot of sewing. She will occasionally feel tingling into the left hand most notably in the #3 through 5 fingers. She does have some headaches but these are localized to the suboccipital region and do not radiate into the forehead eyes or temples. In the past her physical therapist and chiropractors have done manual stretching of the left shoulder and done adjustments which have helped.     Her secondary pain complaints are in the right hip glued and sacral pains. She does sleep with a pillow between the knees. She is unsure why but the last 6 to 12 months this has been getting progressively worse. She will occasionally have pain in the hamstring region but this is rare. She will occasionally feel sharp intense pain in the hip or the lower back but this only typically lasts a few  seconds. Generally is more of a dull aching pain. The point of max tenderness is localized to the middle of the right butt cheek. No one has worked on this or massage it in the past.      Objective   Physical Exam  Neurological:      General: No focal deficit present.      Mental Status: She is alert and oriented to person, place, and time.      Cranial Nerves: No dysarthria or facial asymmetry.      Sensory: Sensation is intact.      Motor: Motor function is intact.      Coordination: Coordination is intact.      Gait: Gait is intact.       Palpation of the following region(s) revealed:  Cervical: Suboccipitals bilateral, muscular hypertonicity.  Scalenes bilateral, muscular hypertonicity.  Upper trapezius bilateral, muscular hypertonicity.  Levator scapulae bilateral, muscular hypertonicity.  Cervical paraspinals bilateral, muscular hypertonicity.  Thoracic: Thoracic paraspinals bilateral, muscular hypertonicity.  Middle trapezius bilateral, muscular hypertonicity.  Lower trapezius bilateral, muscular hypertonicity.  Rhomboids bilateral, muscular hypertonicity.  Lumbar: Lumbar paraspinals bilateral, muscular hypertonicity.  Quadratus lumborum bilateral, muscular hypertonicity.  Gluteal bilateral, muscular hypertonicity.  Piriformis bilateral, muscular hypertonicity.    Segmental Joint(s): Segmental joint dysfunction was assessed with motion palpation and is identified in the following areas:  Cervical : C3 C4 C6  Thoracic : T5, T6, T7, and T8  Lumbopelvic / Sacral SIJ : L3, L4, L5/S1, and R SIJ    Assessment/Plan   Today's Treatment Included: Spinal manipulation to the following regions of segmental dysfunction identified on examination, using age-appropriate and injury specific force. Segmental Joint(s) Cervical : C3 C4 C6 Segmental Joint(s) Thoracic : T5, T6, T7, and T8 Segmental Joint(s) Lumbopelvic/Sacral SIJ : L3, L4, L5/S1, and R SIJ  Chiropractic manipulation treatment techniques utilized: Diversified CMT,  Pelvic drop table technique, and Flexion-distraction technique  Soft tissue mobilization techniques utilized during treatment: Pin and Stretch and Ischemic compression    Soft-tissue mobilization was performed in the following areas:   Suboccipital bilateral, Cervical paraspinal mm. bilateral, Scalenes bilateral, Upper Trapezius bilateral, and Levator Scap. bilateral  Thoracic Paraspinal mm. bilateral, Middle Trapezius bilateral, Lower Trapezius bilateral, and Rhomboids bilateral  Lumbar Paraspinal mm. bilateral, Quadratus Lumborum bilateral, Gluteal mm. Glute. Max.  and Glute. Med. bilateral, and Piriformis bilateral    The patient tolerated today's treatment with little or no additional discomfort and was instructed to contact the office for questions or concerns.

## 2025-03-24 ENCOUNTER — TELEPHONE (OUTPATIENT)
Dept: HEMATOLOGY/ONCOLOGY | Facility: CLINIC | Age: 70
End: 2025-03-24
Payer: MEDICARE

## 2025-03-24 NOTE — TELEPHONE ENCOUNTER
Patient has a raised area below right breast in upper abdomen.  Does not hurt, but is noticeable.  Has developed over last 2 months.  Should she see Dr. Stevenson sooner than FLETCHER scheduled on 3/31?

## 2025-03-24 NOTE — TELEPHONE ENCOUNTER
Left  for Yoana informing her that the 3/31 appt slot is still okay with Dr Stevenson but she does have some availability tomorrow if it would help put her mind at ease. Requested call back to let us know scheduling preference. Await call back. Janel PATELN, RN CMSRN

## 2025-03-28 ENCOUNTER — LAB (OUTPATIENT)
Dept: LAB | Facility: HOSPITAL | Age: 70
End: 2025-03-28
Payer: MEDICARE

## 2025-03-28 DIAGNOSIS — C82.08 GRADE 1 FOLLICULAR LYMPHOMA OF LYMPH NODES OF MULTIPLE REGIONS (MULTI): ICD-10-CM

## 2025-03-28 DIAGNOSIS — C82.08 FOLLICULAR LYMPHOMA GRADE I, LYMPH NODES OF MULTIPLE SITES (MULTI): Primary | ICD-10-CM

## 2025-03-28 LAB
ALBUMIN SERPL BCP-MCNC: 4.4 G/DL (ref 3.4–5)
ALP SERPL-CCNC: 58 U/L (ref 33–136)
ALT SERPL W P-5'-P-CCNC: 11 U/L (ref 7–45)
ANION GAP SERPL CALC-SCNC: 11 MMOL/L (ref 10–20)
AST SERPL W P-5'-P-CCNC: 16 U/L (ref 9–39)
BASOPHILS # BLD AUTO: 0.07 X10*3/UL (ref 0–0.1)
BASOPHILS NFR BLD AUTO: 1.4 %
BILIRUB SERPL-MCNC: 0.6 MG/DL (ref 0–1.2)
BUN SERPL-MCNC: 18 MG/DL (ref 6–23)
CALCIUM SERPL-MCNC: 9 MG/DL (ref 8.6–10.3)
CHLORIDE SERPL-SCNC: 106 MMOL/L (ref 98–107)
CO2 SERPL-SCNC: 30 MMOL/L (ref 21–32)
CREAT SERPL-MCNC: 0.61 MG/DL (ref 0.5–1.05)
EGFRCR SERPLBLD CKD-EPI 2021: >90 ML/MIN/1.73M*2
EOSINOPHIL # BLD AUTO: 0.1 X10*3/UL (ref 0–0.4)
EOSINOPHIL NFR BLD AUTO: 2 %
ERYTHROCYTE [DISTWIDTH] IN BLOOD BY AUTOMATED COUNT: 13.5 % (ref 11.5–14.5)
GLUCOSE SERPL-MCNC: 77 MG/DL (ref 74–99)
HCT VFR BLD AUTO: 42.6 % (ref 36–46)
HGB BLD-MCNC: 13.7 G/DL (ref 12–16)
HOLD SPECIMEN: NORMAL
HOLD SPECIMEN: NORMAL
IMM GRANULOCYTES # BLD AUTO: 0.02 X10*3/UL (ref 0–0.5)
IMM GRANULOCYTES NFR BLD AUTO: 0.4 % (ref 0–0.9)
LDH SERPL L TO P-CCNC: 136 U/L (ref 84–246)
LYMPHOCYTES # BLD AUTO: 1.48 X10*3/UL (ref 0.8–3)
LYMPHOCYTES NFR BLD AUTO: 29.3 %
MCH RBC QN AUTO: 27.4 PG (ref 26–34)
MCHC RBC AUTO-ENTMCNC: 32.2 G/DL (ref 32–36)
MCV RBC AUTO: 85 FL (ref 80–100)
MONOCYTES # BLD AUTO: 0.54 X10*3/UL (ref 0.05–0.8)
MONOCYTES NFR BLD AUTO: 10.7 %
NEUTROPHILS # BLD AUTO: 2.84 X10*3/UL (ref 1.6–5.5)
NEUTROPHILS NFR BLD AUTO: 56.2 %
NRBC BLD-RTO: 0 /100 WBCS (ref 0–0)
PLATELET # BLD AUTO: 262 X10*3/UL (ref 150–450)
POTASSIUM SERPL-SCNC: 4.6 MMOL/L (ref 3.5–5.3)
PROT SERPL-MCNC: 6.5 G/DL (ref 6.4–8.2)
RBC # BLD AUTO: 5 X10*6/UL (ref 4–5.2)
SODIUM SERPL-SCNC: 142 MMOL/L (ref 136–145)
URATE SERPL-MCNC: 3.5 MG/DL (ref 2.3–6.7)
WBC # BLD AUTO: 5.1 X10*3/UL (ref 4.4–11.3)

## 2025-03-28 PROCEDURE — 85025 COMPLETE CBC W/AUTO DIFF WBC: CPT

## 2025-03-28 PROCEDURE — 80053 COMPREHEN METABOLIC PANEL: CPT

## 2025-03-28 PROCEDURE — 84550 ASSAY OF BLOOD/URIC ACID: CPT

## 2025-03-28 PROCEDURE — 83615 LACTATE (LD) (LDH) ENZYME: CPT

## 2025-03-29 NOTE — PROGRESS NOTES
Patient ID: Yoana Herzog is a 70 y.o. female.    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 Yoana Herzog is a 70 y.o. female presenting for follow-up for follicular lymphoma. However, has new lumps in her abdomen which began two months ago, slowly increasing in size. She first noticed them within the breast tissue; denies nipple changes. She does have some fatigue, requires a nap daily and states she sleeps well at night. She also denies night sweats, fevers, new rashes, recent infections    Patient's past medical history, surgical history, family history and social history reviewed.    Review of Systems:   Review of Systems:    Positive per HPI, otherwise negative.     Objective    /82 (BP Location: Right arm, Patient Position: Sitting, BP Cuff Size: Adult)   Pulse 71   Temp 36.3 °C (97.3 °F) (Temporal)   Resp 16   Wt 69.9 kg (154 lb 1.6 oz) Comment: NO SHOES,COAT  SpO2 99%   BMI 28.19 kg/m²      Physical Exam  Gen: appears well in clinic, NAD  HEENT: atraumatic head, normocephalic, EOMI, conjunctiva normal  LUNG: no increased WOB, CTAB  CV: No JVD. RRR  GI: soft, NT, ND  LE: no LE edema  Skin: no obvious rashes or lesions on visible skin  Neuro: interactive, no focal deficits noted  Psych: normal mood and affect    Performance Status:  Symptomatic; fully ambulatory    Labs/Imaging/Pathology: personally reviewed reports and images in Epic electronic medical record system. Pertinent results as it related to the plan represented in below in assessment and plan.     Assessment/Plan    1. 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 Douglass from dental after finding a new mass in her right neck.   - We discussed excision biopsy consistent with classic follicular lymphoma grade 1-2.   - We discussed we will plan for a CT C/A/P to complete staging.  - If there is more lymphadenopathy than expected we could consider a PET CT to ensure there is not another area that is more FDG avid.  - Recent CBC and CMP unremarkable.   - I do not suspect bone marrow involvement.   - We will plan for a phone visit on 1/25/24 end of day.   - I will discuss at Tumor Board if there is any role for any radiation therapy.   - RTC for phone visit on 1/25/24 with scans on 1/22/24.      1/25/24: Telephone call     - given some LAD below the neck on recent CT 1/22/23  - discussed at  this AM 1/25/24 and recommended PET/CT  - PET/CT in 2 weeks     2/12/24: Telephone call   - discussed office visit to consider treatment given some increased infections rather than watch and wait approach  - would consider weekly rituximab x4      2/22/24:  - Overall, we discussed given she does have bone involvement I do recommend treatment.   - We discussed she would be considered stage 4 with her lymphoma and she is having symptoms with bone pain.  - We discussed given she has been minimally asymptomatic we could start with weekly rituximab rather than bendamustine/rituximab as she is very nervous about side effects from traditional chemo. We reviewed side effects and consent signed. We will plan to start Tuesday.  - Labs completed yesterday. Hepatitis B core antibody nonreactive.    - We will plan for a PET CT 7 weeks after her last cycle and follow-up with me 8 weeks post treatment for consideration of maintenance therapy and ensuring response versus addition of chemotherapy.  - RTC with me 8 weeks post.     3/20/2024:   - Presents for 4th and final cycle of weekly Rituxan  - Had reaction with cycle 1  - Had sore throat earlier in the week but urgent  care swabbed for multiple viruses which came back negative, patient reports significant improvement of symptoms with allergy medications  - Since she is feeling better and had negative testing and denies fevers/chills/significant fatigue or appetite loss I do feel she can proceed with treatment today if labs are stable and patient agrees with plan as she does not want to delay   - Labs reviewed and stable, patient went on to get treatment as planned   - She is already scheduled to RTC in 8 weeks to review PET scan scheduled in 7 weeks      9/13/24:  - No new lumps or bumps.  - No changes in labs with no new cytopenias.  - No new B symptoms.  - We discussed that ultimately her disease appears to be well controlled.   - At this point we will continue maintenance rituximab.  - Will plan for repeat scans in December.  - Follow-up 11/8/24 for treatment only.   - RTC with me in 16 weeks after scans.     12/30/24: Telephone visit   - Will hold off on rituximab, given there is concern it is affecting her teeth.   - We also reviewed that maintenance Rituximab does not improve survival but can help with progression free before next line of treatment.  - At this time, she also wishes to hold off.  - Recent CT scan did not show any worsening lymphadenopathy, but it did show IPMN will refer her to pancreatic clinic with Nya Thapa.   - No other new concerning findings or B symptoms   - RTC in three months and at that point we will reconsider restarting rituximab.    3/31/25:   - We reviewed lumps in her belly which appear to be subcutaneaous fat   - We reviewed MRCP images and also could see areas she was describing in subcutaneous tissue   - No palpable lymphadenopathy, no splenomegaly   - Given pancreatic cysts, will plan for a CT with pancreatic protocol in July, which would be 6 months from MRCP   - No contraindications to proceeding with Rituximab  - will continue to monitor for toxicity   - Labs reviewed   - RTC in 2  months for treatment only, 4 months with Dr. Stevenson and treatment, CT prior       Reviewed ongoing medical problems and how they relate to her follicular lymphoma, will continue long term monitoring.    RTC in 2 months for treatment only, 4 months with Dr. Stevenson and treatment, CT prior  This note has been transcribed using a medical scribe and there is a possibility of unintentional typing misprints    Diagnoses and all orders for this visit:  Grade 1 follicular lymphoma of lymph nodes of multiple regions (Multi)  -     Clinic Appointment Request  -     Clinic Appointment Request; Future  -     Infusion Appointment Request; Future  -     CBC and Auto Differential; Future  -     Comprehensive metabolic panel; Future  -     Lactate dehydrogenase; Future  -     Uric Acid; Future  -     Clinic Appointment Request; Future  -     Infusion Appointment Request; Future  -     CBC and Auto Differential; Future  -     Comprehensive metabolic panel; Future  -     Lactate dehydrogenase; Future  -     Uric Acid; Future  Pancreatic cyst (HHS-HCC)  -     CT pancreas pre OP evaluation w IV contrast; Future  Other orders  -     diphenhydrAMINE (BENADryl) 50 mg in sodium chloride 0.9% 50 mL IV  -     dexAMETHasone (Decadron) 16 mg in dextrose 5% 50 mL IV  -     ondansetron (Zofran) injection 4 mg  -     famotidine PF (Pepcid) injection 40 mg  -     acetaminophen (Tylenol) tablet 650 mg  -     prochlorperazine (Compazine) tablet 10 mg  -     prochlorperazine (Compazine) injection 10 mg  -     riTUXimab-pvvr (Ruxience) 600 mg in sodium chloride 0.9% 310 mL IV  -     sodium chloride 0.9 % bolus 500 mL  -     dextrose 5 % in water (D5W) bolus 500 mL  -     diphenhydrAMINE (BENADryl) injection 50 mg  -     methylPREDNISolone sod succinate (SOLU-Medrol) 40 mg/mL injection 40 mg  -     famotidine PF (Pepcid) injection 20 mg  -     EPINEPHrine (Epipen) injection syringe 0.3 mg  -     albuterol 2.5 mg /3 mL (0.083 %) nebulizer solution 3 mL  -      diphenhydrAMINE (BENADryl) 50 mg in sodium chloride 0.9% 50 mL IV  -     dexAMETHasone (Decadron) 16 mg in dextrose 5% 50 mL IV  -     ondansetron (Zofran) injection 4 mg  -     famotidine PF (Pepcid) injection 40 mg  -     acetaminophen (Tylenol) tablet 650 mg  -     prochlorperazine (Compazine) tablet 10 mg  -     prochlorperazine (Compazine) injection 10 mg  -     riTUXimab-pvvr (Ruxience) 600 mg in sodium chloride 0.9% 310 mL IV  -     sodium chloride 0.9 % bolus 500 mL  -     dextrose 5 % in water (D5W) bolus 500 mL  -     diphenhydrAMINE (BENADryl) injection 50 mg  -     methylPREDNISolone sod succinate (SOLU-Medrol) 40 mg/mL injection 40 mg  -     famotidine PF (Pepcid) injection 20 mg  -     EPINEPHrine (Epipen) injection syringe 0.3 mg  -     albuterol 2.5 mg /3 mL (0.083 %) nebulizer solution 3 mL    Uma Stevenson MD  Hematology/Oncology  [facility]      Scribe Attestation  By signing my name below, IPhoebe Scribe, attest that this documentation has been prepared under the direction and in the presence of Uma Stevenson MD.      Time Spent  Prep time on day of patient encounter:   Time spent directly with patient, family or caregiver:   Additional Time Spent on Patient Care Activities:   Documentation Time:   Other Time Spent:   Total:

## 2025-03-31 ENCOUNTER — OFFICE VISIT (OUTPATIENT)
Dept: HEMATOLOGY/ONCOLOGY | Facility: CLINIC | Age: 70
End: 2025-03-31
Payer: MEDICARE

## 2025-03-31 ENCOUNTER — HOSPITAL ENCOUNTER (OUTPATIENT)
Dept: CARDIOLOGY | Facility: HOSPITAL | Age: 70
Discharge: HOME | End: 2025-03-31
Payer: MEDICARE

## 2025-03-31 VITALS
SYSTOLIC BLOOD PRESSURE: 132 MMHG | DIASTOLIC BLOOD PRESSURE: 82 MMHG | BODY MASS INDEX: 28.19 KG/M2 | WEIGHT: 154.1 LBS | OXYGEN SATURATION: 99 % | RESPIRATION RATE: 16 BRPM | TEMPERATURE: 97.3 F | HEART RATE: 71 BPM

## 2025-03-31 DIAGNOSIS — K86.2 PANCREATIC CYST (HHS-HCC): ICD-10-CM

## 2025-03-31 DIAGNOSIS — C82.08 GRADE 1 FOLLICULAR LYMPHOMA OF LYMPH NODES OF MULTIPLE REGIONS (MULTI): Primary | ICD-10-CM

## 2025-03-31 DIAGNOSIS — Z01.818 PRE-OP EXAMINATION: ICD-10-CM

## 2025-03-31 LAB
ATRIAL RATE: 56 BPM
P AXIS: 28 DEGREES
P OFFSET: 198 MS
P ONSET: 147 MS
PR INTERVAL: 162 MS
Q ONSET: 228 MS
QRS COUNT: 9 BEATS
QRS DURATION: 72 MS
QT INTERVAL: 426 MS
QTC CALCULATION(BAZETT): 411 MS
QTC FREDERICIA: 416 MS
R AXIS: -19 DEGREES
T AXIS: 26 DEGREES
T OFFSET: 441 MS
VENTRICULAR RATE: 56 BPM

## 2025-03-31 PROCEDURE — 1157F ADVNC CARE PLAN IN RCRD: CPT | Performed by: INTERNAL MEDICINE

## 2025-03-31 PROCEDURE — 1126F AMNT PAIN NOTED NONE PRSNT: CPT | Performed by: INTERNAL MEDICINE

## 2025-03-31 PROCEDURE — 1123F ACP DISCUSS/DSCN MKR DOCD: CPT | Performed by: INTERNAL MEDICINE

## 2025-03-31 PROCEDURE — 99214 OFFICE O/P EST MOD 30 MIN: CPT | Performed by: INTERNAL MEDICINE

## 2025-03-31 PROCEDURE — 93005 ELECTROCARDIOGRAM TRACING: CPT

## 2025-03-31 PROCEDURE — 1159F MED LIST DOCD IN RCRD: CPT | Performed by: INTERNAL MEDICINE

## 2025-03-31 PROCEDURE — G2211 COMPLEX E/M VISIT ADD ON: HCPCS | Performed by: INTERNAL MEDICINE

## 2025-03-31 RX ORDER — ALBUTEROL SULFATE 0.83 MG/ML
3 SOLUTION RESPIRATORY (INHALATION) AS NEEDED
OUTPATIENT
Start: 2025-05-26

## 2025-03-31 RX ORDER — DIPHENHYDRAMINE HYDROCHLORIDE 50 MG/ML
50 INJECTION, SOLUTION INTRAMUSCULAR; INTRAVENOUS AS NEEDED
OUTPATIENT
Start: 2025-05-26

## 2025-03-31 RX ORDER — ACETAMINOPHEN 325 MG/1
650 TABLET ORAL ONCE
OUTPATIENT
Start: 2025-05-26

## 2025-03-31 RX ORDER — ONDANSETRON HYDROCHLORIDE 2 MG/ML
4 INJECTION, SOLUTION INTRAVENOUS ONCE
OUTPATIENT
Start: 2025-05-26 | End: 2025-05-26

## 2025-03-31 RX ORDER — PROCHLORPERAZINE EDISYLATE 5 MG/ML
10 INJECTION INTRAMUSCULAR; INTRAVENOUS EVERY 6 HOURS PRN
OUTPATIENT
Start: 2025-05-26

## 2025-03-31 RX ORDER — ONDANSETRON HYDROCHLORIDE 2 MG/ML
4 INJECTION, SOLUTION INTRAVENOUS ONCE
OUTPATIENT
Start: 2025-07-21 | End: 2025-07-21

## 2025-03-31 RX ORDER — EPINEPHRINE 0.3 MG/.3ML
0.3 INJECTION SUBCUTANEOUS EVERY 5 MIN PRN
OUTPATIENT
Start: 2025-05-26

## 2025-03-31 RX ORDER — FAMOTIDINE 10 MG/ML
20 INJECTION, SOLUTION INTRAVENOUS ONCE AS NEEDED
OUTPATIENT
Start: 2025-07-21

## 2025-03-31 RX ORDER — FAMOTIDINE 10 MG/ML
40 INJECTION, SOLUTION INTRAVENOUS ONCE
OUTPATIENT
Start: 2025-05-26 | End: 2025-05-26

## 2025-03-31 RX ORDER — ACETAMINOPHEN 325 MG/1
650 TABLET ORAL ONCE
OUTPATIENT
Start: 2025-07-21

## 2025-03-31 RX ORDER — FAMOTIDINE 10 MG/ML
40 INJECTION, SOLUTION INTRAVENOUS ONCE
OUTPATIENT
Start: 2025-07-21 | End: 2025-07-21

## 2025-03-31 RX ORDER — DIPHENHYDRAMINE HYDROCHLORIDE 50 MG/ML
50 INJECTION, SOLUTION INTRAMUSCULAR; INTRAVENOUS AS NEEDED
OUTPATIENT
Start: 2025-07-21

## 2025-03-31 RX ORDER — PROCHLORPERAZINE EDISYLATE 5 MG/ML
10 INJECTION INTRAMUSCULAR; INTRAVENOUS EVERY 6 HOURS PRN
OUTPATIENT
Start: 2025-07-21

## 2025-03-31 RX ORDER — PROCHLORPERAZINE MALEATE 10 MG
10 TABLET ORAL EVERY 6 HOURS PRN
OUTPATIENT
Start: 2025-05-26

## 2025-03-31 RX ORDER — ALBUTEROL SULFATE 0.83 MG/ML
3 SOLUTION RESPIRATORY (INHALATION) AS NEEDED
OUTPATIENT
Start: 2025-07-21

## 2025-03-31 RX ORDER — FAMOTIDINE 10 MG/ML
20 INJECTION, SOLUTION INTRAVENOUS ONCE AS NEEDED
OUTPATIENT
Start: 2025-05-26

## 2025-03-31 RX ORDER — PROCHLORPERAZINE MALEATE 10 MG
10 TABLET ORAL EVERY 6 HOURS PRN
OUTPATIENT
Start: 2025-07-21

## 2025-03-31 RX ORDER — EPINEPHRINE 0.3 MG/.3ML
0.3 INJECTION SUBCUTANEOUS EVERY 5 MIN PRN
OUTPATIENT
Start: 2025-07-21

## 2025-03-31 ASSESSMENT — PAIN SCALES - GENERAL: PAINLEVEL_OUTOF10: 0-NO PAIN

## 2025-04-01 ENCOUNTER — INFUSION (OUTPATIENT)
Dept: HEMATOLOGY/ONCOLOGY | Facility: CLINIC | Age: 70
End: 2025-04-01
Payer: MEDICARE

## 2025-04-01 VITALS
TEMPERATURE: 97.2 F | WEIGHT: 153.88 LBS | DIASTOLIC BLOOD PRESSURE: 63 MMHG | SYSTOLIC BLOOD PRESSURE: 125 MMHG | OXYGEN SATURATION: 99 % | HEART RATE: 62 BPM | BODY MASS INDEX: 28.15 KG/M2 | RESPIRATION RATE: 16 BRPM

## 2025-04-01 DIAGNOSIS — C82.08 GRADE 1 FOLLICULAR LYMPHOMA OF LYMPH NODES OF MULTIPLE REGIONS (MULTI): ICD-10-CM

## 2025-04-01 LAB
MAGNESIUM SERPL-MCNC: 2.2 MG/DL (ref 1.5–2.5)
TSH SERPL-ACNC: 1.54 MIU/L (ref 0.4–4.5)

## 2025-04-01 PROCEDURE — 96415 CHEMO IV INFUSION ADDL HR: CPT

## 2025-04-01 PROCEDURE — 96375 TX/PRO/DX INJ NEW DRUG ADDON: CPT | Mod: INF

## 2025-04-01 PROCEDURE — 2500000001 HC RX 250 WO HCPCS SELF ADMINISTERED DRUGS (ALT 637 FOR MEDICARE OP): Performed by: INTERNAL MEDICINE

## 2025-04-01 PROCEDURE — 2500000004 HC RX 250 GENERAL PHARMACY W/ HCPCS (ALT 636 FOR OP/ED): Performed by: INTERNAL MEDICINE

## 2025-04-01 PROCEDURE — 96413 CHEMO IV INFUSION 1 HR: CPT

## 2025-04-01 RX ORDER — ALBUTEROL SULFATE 0.83 MG/ML
3 SOLUTION RESPIRATORY (INHALATION) AS NEEDED
Status: DISCONTINUED | OUTPATIENT
Start: 2025-04-01 | End: 2025-04-01 | Stop reason: HOSPADM

## 2025-04-01 RX ORDER — ACETAMINOPHEN 325 MG/1
650 TABLET ORAL ONCE
Status: COMPLETED | OUTPATIENT
Start: 2025-04-01 | End: 2025-04-01

## 2025-04-01 RX ORDER — EPINEPHRINE 0.3 MG/.3ML
0.3 INJECTION SUBCUTANEOUS EVERY 5 MIN PRN
Status: DISCONTINUED | OUTPATIENT
Start: 2025-04-01 | End: 2025-04-01 | Stop reason: HOSPADM

## 2025-04-01 RX ORDER — PROCHLORPERAZINE EDISYLATE 5 MG/ML
10 INJECTION INTRAMUSCULAR; INTRAVENOUS EVERY 6 HOURS PRN
Status: DISCONTINUED | OUTPATIENT
Start: 2025-04-01 | End: 2025-04-01 | Stop reason: HOSPADM

## 2025-04-01 RX ORDER — DIPHENHYDRAMINE HYDROCHLORIDE 50 MG/ML
50 INJECTION, SOLUTION INTRAMUSCULAR; INTRAVENOUS AS NEEDED
Status: DISCONTINUED | OUTPATIENT
Start: 2025-04-01 | End: 2025-04-01 | Stop reason: HOSPADM

## 2025-04-01 RX ORDER — FAMOTIDINE 10 MG/ML
40 INJECTION, SOLUTION INTRAVENOUS ONCE
Status: COMPLETED | OUTPATIENT
Start: 2025-04-01 | End: 2025-04-01

## 2025-04-01 RX ORDER — ONDANSETRON HYDROCHLORIDE 2 MG/ML
4 INJECTION, SOLUTION INTRAVENOUS ONCE
Status: COMPLETED | OUTPATIENT
Start: 2025-04-01 | End: 2025-04-01

## 2025-04-01 RX ORDER — FAMOTIDINE 10 MG/ML
20 INJECTION, SOLUTION INTRAVENOUS ONCE AS NEEDED
Status: DISCONTINUED | OUTPATIENT
Start: 2025-04-01 | End: 2025-04-01 | Stop reason: HOSPADM

## 2025-04-01 RX ORDER — PROCHLORPERAZINE MALEATE 10 MG
10 TABLET ORAL EVERY 6 HOURS PRN
Status: DISCONTINUED | OUTPATIENT
Start: 2025-04-01 | End: 2025-04-01 | Stop reason: HOSPADM

## 2025-04-01 RX ADMIN — DEXAMETHASONE SODIUM PHOSPHATE 16 MG: 10 INJECTION, SOLUTION INTRAMUSCULAR; INTRAVENOUS at 09:32

## 2025-04-01 RX ADMIN — ONDANSETRON 4 MG: 2 INJECTION INTRAMUSCULAR; INTRAVENOUS at 09:36

## 2025-04-01 RX ADMIN — ACETAMINOPHEN 650 MG: 325 TABLET ORAL at 09:32

## 2025-04-01 RX ADMIN — SODIUM CHLORIDE 600 MG: 9 INJECTION, SOLUTION INTRAVENOUS at 10:21

## 2025-04-01 RX ADMIN — FAMOTIDINE 40 MG: 10 INJECTION INTRAVENOUS at 09:33

## 2025-04-01 RX ADMIN — DIPHENHYDRAMINE HYDROCHLORIDE 50 MG: 50 INJECTION, SOLUTION INTRAMUSCULAR; INTRAVENOUS at 09:33

## 2025-04-01 ASSESSMENT — PAIN SCALES - GENERAL: PAINLEVEL_OUTOF10: 0-NO PAIN

## 2025-04-01 NOTE — SIGNIFICANT EVENT

## 2025-04-02 ENCOUNTER — APPOINTMENT (OUTPATIENT)
Dept: PRIMARY CARE | Facility: CLINIC | Age: 70
End: 2025-04-02
Payer: MEDICARE

## 2025-04-02 VITALS
TEMPERATURE: 98.6 F | HEIGHT: 62 IN | BODY MASS INDEX: 28.93 KG/M2 | OXYGEN SATURATION: 98 % | HEART RATE: 60 BPM | SYSTOLIC BLOOD PRESSURE: 120 MMHG | RESPIRATION RATE: 16 BRPM | DIASTOLIC BLOOD PRESSURE: 62 MMHG | WEIGHT: 157.2 LBS

## 2025-04-02 DIAGNOSIS — Z01.818 PREOP EXAMINATION: ICD-10-CM

## 2025-04-02 DIAGNOSIS — E55.9 VITAMIN D DEFICIENCY: ICD-10-CM

## 2025-04-02 DIAGNOSIS — M17.5 OTHER SECONDARY OSTEOARTHRITIS OF LEFT KNEE: Primary | ICD-10-CM

## 2025-04-02 DIAGNOSIS — Z00.00 HEALTH CARE MAINTENANCE: ICD-10-CM

## 2025-04-02 DIAGNOSIS — R20.0 NUMBNESS: ICD-10-CM

## 2025-04-02 DIAGNOSIS — E78.5 DYSLIPIDEMIA: ICD-10-CM

## 2025-04-02 DIAGNOSIS — C82.08 GRADE 1 FOLLICULAR LYMPHOMA OF LYMPH NODES OF MULTIPLE REGIONS (MULTI): ICD-10-CM

## 2025-04-02 PROBLEM — K86.2 PANCREATIC CYST (HHS-HCC): Status: ACTIVE | Noted: 2025-04-02

## 2025-04-02 PROCEDURE — 1158F ADVNC CARE PLAN TLK DOCD: CPT | Performed by: INTERNAL MEDICINE

## 2025-04-02 PROCEDURE — 1159F MED LIST DOCD IN RCRD: CPT | Performed by: INTERNAL MEDICINE

## 2025-04-02 PROCEDURE — 1123F ACP DISCUSS/DSCN MKR DOCD: CPT | Performed by: INTERNAL MEDICINE

## 2025-04-02 PROCEDURE — 3008F BODY MASS INDEX DOCD: CPT | Performed by: INTERNAL MEDICINE

## 2025-04-02 PROCEDURE — 1157F ADVNC CARE PLAN IN RCRD: CPT | Performed by: INTERNAL MEDICINE

## 2025-04-02 PROCEDURE — 1160F RVW MEDS BY RX/DR IN RCRD: CPT | Performed by: INTERNAL MEDICINE

## 2025-04-02 PROCEDURE — 99215 OFFICE O/P EST HI 40 MIN: CPT | Performed by: INTERNAL MEDICINE

## 2025-04-02 ASSESSMENT — ENCOUNTER SYMPTOMS
UNEXPECTED WEIGHT CHANGE: 0
DYSURIA: 0
CONFUSION: 0
FATIGUE: 1
DIZZINESS: 0
BACK PAIN: 1
CHEST TIGHTNESS: 0
SORE THROAT: 0
DIARRHEA: 0
NAUSEA: 0
CHILLS: 0
COUGH: 0
SLEEP DISTURBANCE: 0
NECK PAIN: 1
SHORTNESS OF BREATH: 0
ARTHRALGIAS: 1
NUMBNESS: 1
WHEEZING: 0
VOMITING: 0
WEAKNESS: 0
PALPITATIONS: 0
ADENOPATHY: 0
JOINT SWELLING: 0
ABDOMINAL PAIN: 0
CONSTIPATION: 0
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
## 2025-04-03 ENCOUNTER — LAB REQUISITION (OUTPATIENT)
Dept: LAB | Facility: HOSPITAL | Age: 70
End: 2025-04-03
Payer: MEDICARE

## 2025-04-03 ENCOUNTER — LAB (OUTPATIENT)
Dept: LAB | Facility: HOSPITAL | Age: 70
End: 2025-04-03
Payer: MEDICARE

## 2025-04-03 DIAGNOSIS — Z01.818 ENCOUNTER FOR OTHER PREPROCEDURAL EXAMINATION: ICD-10-CM

## 2025-04-03 LAB
ALBUMIN SERPL BCP-MCNC: 4.1 G/DL (ref 3.4–5)
ALP SERPL-CCNC: 55 U/L (ref 33–136)
ALT SERPL W P-5'-P-CCNC: 13 U/L (ref 7–45)
ANION GAP SERPL CALC-SCNC: 10 MMOL/L (ref 10–20)
APTT PPP: 33 SECONDS (ref 26–36)
AST SERPL W P-5'-P-CCNC: 17 U/L (ref 9–39)
BASOPHILS # BLD AUTO: 0.07 X10*3/UL (ref 0–0.1)
BASOPHILS NFR BLD AUTO: 0.8 %
BILIRUB SERPL-MCNC: 0.3 MG/DL (ref 0–1.2)
BUN SERPL-MCNC: 16 MG/DL (ref 6–23)
CALCIUM SERPL-MCNC: 8.9 MG/DL (ref 8.6–10.3)
CHLORIDE SERPL-SCNC: 103 MMOL/L (ref 98–107)
CO2 SERPL-SCNC: 32 MMOL/L (ref 21–32)
CREAT SERPL-MCNC: 0.66 MG/DL (ref 0.5–1.05)
EGFRCR SERPLBLD CKD-EPI 2021: >90 ML/MIN/1.73M*2
EOSINOPHIL # BLD AUTO: 0.09 X10*3/UL (ref 0–0.4)
EOSINOPHIL NFR BLD AUTO: 1.1 %
ERYTHROCYTE [DISTWIDTH] IN BLOOD BY AUTOMATED COUNT: 13.5 % (ref 11.5–14.5)
GLUCOSE SERPL-MCNC: 77 MG/DL (ref 74–99)
HCT VFR BLD AUTO: 40.2 % (ref 36–46)
HGB BLD-MCNC: 12.5 G/DL (ref 12–16)
IMM GRANULOCYTES # BLD AUTO: 0.02 X10*3/UL (ref 0–0.5)
IMM GRANULOCYTES NFR BLD AUTO: 0.2 % (ref 0–0.9)
INR PPP: 1 (ref 0.9–1.1)
LYMPHOCYTES # BLD AUTO: 1.48 X10*3/UL (ref 0.8–3)
LYMPHOCYTES NFR BLD AUTO: 17.6 %
MCH RBC QN AUTO: 27.1 PG (ref 26–34)
MCHC RBC AUTO-ENTMCNC: 31.1 G/DL (ref 32–36)
MCV RBC AUTO: 87 FL (ref 80–100)
MONOCYTES # BLD AUTO: 0.9 X10*3/UL (ref 0.05–0.8)
MONOCYTES NFR BLD AUTO: 10.7 %
NEUTROPHILS # BLD AUTO: 5.86 X10*3/UL (ref 1.6–5.5)
NEUTROPHILS NFR BLD AUTO: 69.6 %
NRBC BLD-RTO: 0 /100 WBCS (ref 0–0)
PLATELET # BLD AUTO: 220 X10*3/UL (ref 150–450)
POTASSIUM SERPL-SCNC: 4.2 MMOL/L (ref 3.5–5.3)
PROT SERPL-MCNC: 6.1 G/DL (ref 6.4–8.2)
PROTHROMBIN TIME: 11.4 SECONDS (ref 9.8–12.4)
RBC # BLD AUTO: 4.61 X10*6/UL (ref 4–5.2)
SODIUM SERPL-SCNC: 141 MMOL/L (ref 136–145)
WBC # BLD AUTO: 8.4 X10*3/UL (ref 4.4–11.3)

## 2025-04-03 PROCEDURE — 85730 THROMBOPLASTIN TIME PARTIAL: CPT

## 2025-04-03 PROCEDURE — 85025 COMPLETE CBC W/AUTO DIFF WBC: CPT

## 2025-04-03 PROCEDURE — 85610 PROTHROMBIN TIME: CPT

## 2025-04-03 PROCEDURE — 80053 COMPREHEN METABOLIC PANEL: CPT

## 2025-04-04 ENCOUNTER — APPOINTMENT (OUTPATIENT)
Dept: INTEGRATIVE MEDICINE | Facility: CLINIC | Age: 70
End: 2025-04-04
Payer: MEDICARE

## 2025-04-04 DIAGNOSIS — M99.03 SEGMENTAL AND SOMATIC DYSFUNCTION OF LUMBAR REGION: ICD-10-CM

## 2025-04-04 DIAGNOSIS — M79.10 MYALGIA: ICD-10-CM

## 2025-04-04 DIAGNOSIS — M99.04 SEGMENTAL AND SOMATIC DYSFUNCTION OF SACRAL REGION: ICD-10-CM

## 2025-04-04 DIAGNOSIS — M53.3 SACRAL BACK PAIN: ICD-10-CM

## 2025-04-04 DIAGNOSIS — M99.02 SEGMENTAL AND SOMATIC DYSFUNCTION OF THORACIC REGION: ICD-10-CM

## 2025-04-04 DIAGNOSIS — M79.9 POSTURAL STRAIN: ICD-10-CM

## 2025-04-04 DIAGNOSIS — M54.2 NECK PAIN: ICD-10-CM

## 2025-04-04 DIAGNOSIS — M99.01 SEGMENTAL AND SOMATIC DYSFUNCTION OF CERVICAL REGION: Primary | ICD-10-CM

## 2025-04-04 PROCEDURE — 98941 CHIROPRACT MANJ 3-4 REGIONS: CPT

## 2025-04-04 NOTE — PROGRESS NOTES
Subjective   Patient ID: Yoana Herzog is a 70 y.o. female who presents April 4, 2025 for chiropractic care.    Medicare (4) VPCY    Today, the patient rates their degree of pain as a 3 out of 10 on the numeric pain rating scale.     Yoana reports discomfort in the same areas, upper back, neck, and low back. Her knee procedure is next week.  ________________________________________________  (SP)INITIAL INTAKE/SUBJECTIVE 12/12/22: She has seen chiropractic in the past. Her last adjustment was about 5 weeks ago. Is not that she is dissatisfied with the care she has been given, but she is hoping that our treatments will help her completely resolve the issue to the point where she does not need regular care.     Today her primary complaint is left neck and shoulder pain. 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 She also enjoys sewing and notices this pain does get worse after she has been doing a lot of sewing. She will occasionally feel tingling into the left hand most notably in the #3 through 5 fingers. She does have some headaches but these are localized to the suboccipital region and do not radiate into the forehead eyes or temples. In the past her physical therapist and chiropractors have done manual stretching of the left shoulder and done adjustments which have helped.     Her secondary pain complaints are in the right hip glued and sacral pains. She does sleep with a pillow between the knees. She is unsure why but the last 6 to 12 months this has been getting progressively worse. She will occasionally have pain in the hamstring region but this is rare. She will occasionally feel sharp intense pain in the hip or the lower back but this only typically lasts a few seconds. Generally is more of a dull aching pain. The point of max tenderness is localized to the middle of the right butt cheek. No one has worked on this  or massage it in the past.      Objective   Physical Exam  Neurological:      General: No focal deficit present.      Mental Status: She is alert and oriented to person, place, and time.      Cranial Nerves: No dysarthria or facial asymmetry.      Sensory: Sensation is intact.      Motor: Motor function is intact.      Coordination: Coordination is intact.      Gait: Gait is intact.       Palpation of the following region(s) revealed:  Cervical: Suboccipitals bilateral, muscular hypertonicity.  Scalenes bilateral, muscular hypertonicity.  Upper trapezius bilateral, muscular hypertonicity.  Levator scapulae bilateral, muscular hypertonicity.  Cervical paraspinals bilateral, muscular hypertonicity.  Thoracic: Thoracic paraspinals bilateral, muscular hypertonicity.  Middle trapezius bilateral, muscular hypertonicity.  Lower trapezius bilateral, muscular hypertonicity.  Rhomboids bilateral, muscular hypertonicity.  Lumbar: Lumbar paraspinals bilateral, muscular hypertonicity.  Quadratus lumborum bilateral, muscular hypertonicity.  Gluteal bilateral, muscular hypertonicity.  Piriformis bilateral, muscular hypertonicity.    Segmental Joint(s): Segmental joint dysfunction was assessed with motion palpation and is identified in the following areas:  Cervical : C3 C4 C6  Thoracic : T5, T6, T7, and T8  Lumbopelvic / Sacral SIJ : L3, L4, L5/S1, and R SIJ    Assessment/Plan   Today's Treatment Included: Spinal manipulation to the following regions of segmental dysfunction identified on examination, using age-appropriate and injury specific force. Segmental Joint(s) Cervical : C3 C4 C6 Segmental Joint(s) Thoracic : T5, T6, T7, and T8 Segmental Joint(s) Lumbopelvic/Sacral SIJ : L3, L4, L5/S1, and R SIJ  Chiropractic manipulation treatment techniques utilized: Diversified CMT, Pelvic drop table technique, and Flexion-distraction technique  Soft tissue mobilization techniques utilized during treatment: Pin and Stretch and Ischemic  compression    Soft-tissue mobilization was performed in the following areas:   Suboccipital bilateral, Cervical paraspinal mm. bilateral, Scalenes bilateral, Upper Trapezius bilateral, and Levator Scap. bilateral  Thoracic Paraspinal mm. bilateral, Middle Trapezius bilateral, Lower Trapezius bilateral, and Rhomboids bilateral  Lumbar Paraspinal mm. bilateral, Quadratus Lumborum bilateral, Gluteal mm. Glute. Max.  and Glute. Med. bilateral, and Piriformis bilateral    The patient tolerated today's treatment with little or no additional discomfort and was instructed to contact the office for questions or concerns.

## 2025-04-05 LAB
25(OH)D3+25(OH)D2 SERPL-MCNC: 61 NG/ML (ref 30–100)
APTT PPP: 28 SEC (ref 23–32)
CHOLEST SERPL-MCNC: 179 MG/DL
CHOLEST/HDLC SERPL: 2.8 (CALC)
HDLC SERPL-MCNC: 64 MG/DL
INR PPP: 1
LDLC SERPL CALC-MCNC: 91 MG/DL (CALC)
MAGNESIUM SERPL-MCNC: 2.3 MG/DL (ref 1.5–2.5)
NONHDLC SERPL-MCNC: 115 MG/DL (CALC)
PROTHROMBIN TIME: 10.6 SEC (ref 9–11.5)
TRIGL SERPL-MCNC: 137 MG/DL
VIT B12 SERPL-MCNC: 599 PG/ML (ref 200–1100)

## 2025-04-07 DIAGNOSIS — S83.272D COMPLEX TEAR OF LATERAL MENISCUS OF LEFT KNEE AS CURRENT INJURY, SUBSEQUENT ENCOUNTER: ICD-10-CM

## 2025-04-07 DIAGNOSIS — S83.242A ACUTE MEDIAL MENISCAL TEAR, LEFT, INITIAL ENCOUNTER: ICD-10-CM

## 2025-04-07 RX ORDER — HYDROCODONE BITARTRATE AND ACETAMINOPHEN 5; 325 MG/1; MG/1
1 TABLET ORAL EVERY 6 HOURS PRN
Qty: 15 TABLET | Refills: 0 | Status: SHIPPED | OUTPATIENT
Start: 2025-04-07

## 2025-04-08 PROCEDURE — 29880 ARTHRS KNE SRG MNISECTMY M&L: CPT | Performed by: STUDENT IN AN ORGANIZED HEALTH CARE EDUCATION/TRAINING PROGRAM

## 2025-04-10 ENCOUNTER — APPOINTMENT (OUTPATIENT)
Dept: PRIMARY CARE | Facility: CLINIC | Age: 70
End: 2025-04-10
Payer: MEDICARE

## 2025-04-21 ENCOUNTER — OFFICE VISIT (OUTPATIENT)
Dept: ORTHOPEDIC SURGERY | Facility: CLINIC | Age: 70
End: 2025-04-21
Payer: MEDICARE

## 2025-04-21 DIAGNOSIS — Z87.828 S/P ARTHROSCOPIC PARTIAL MEDIAL MENISCECTOMY OF LEFT KNEE: ICD-10-CM

## 2025-04-21 DIAGNOSIS — Z98.890 S/P ARTHROSCOPIC PARTIAL MEDIAL MENISCECTOMY OF LEFT KNEE: ICD-10-CM

## 2025-04-21 DIAGNOSIS — Z98.890 S/P ARTHROSCOPIC PARTIAL LATERAL MENISCECTOMY OF LEFT KNEE: ICD-10-CM

## 2025-04-21 DIAGNOSIS — Z87.828 S/P ARTHROSCOPIC PARTIAL LATERAL MENISCECTOMY OF LEFT KNEE: ICD-10-CM

## 2025-04-21 PROCEDURE — 99024 POSTOP FOLLOW-UP VISIT: CPT | Performed by: STUDENT IN AN ORGANIZED HEALTH CARE EDUCATION/TRAINING PROGRAM

## 2025-04-21 PROCEDURE — 1157F ADVNC CARE PLAN IN RCRD: CPT | Performed by: STUDENT IN AN ORGANIZED HEALTH CARE EDUCATION/TRAINING PROGRAM

## 2025-04-21 PROCEDURE — 1123F ACP DISCUSS/DSCN MKR DOCD: CPT | Performed by: STUDENT IN AN ORGANIZED HEALTH CARE EDUCATION/TRAINING PROGRAM

## 2025-04-21 PROCEDURE — 99211 OFF/OP EST MAY X REQ PHY/QHP: CPT | Performed by: STUDENT IN AN ORGANIZED HEALTH CARE EDUCATION/TRAINING PROGRAM

## 2025-04-21 NOTE — PROGRESS NOTES
Chief Complaint   Patient presents with    Left Knee - Results, Post-op     Arthroscopic partial medial and lateral meniscectomy 4/8 2025       History of Present Illness  Patient returns today noting minimal pain.  Denies any calf pain or shortness of breath.  Patient is endorsing a moderate amount of swelling, bruising and associated stiffness.  Does state that this is slightly getting better.  Pain appropriate for postop course.  Denies any significant numbness or tingling to the distal lower extremity.     Exam  Moderate effusion  Healthy incisions - no active drainage  Good range of motion 0-95  No calf swelling  Negative Stephen´s test  Distal neurovascular exam intact     Assessment  Patient status post left knee arthroscopy partial medial and lateral meniscectomy, patellofemoral chondroplasty, 2 weeks out     Plan  Surgical details discussed, reviewed arthroscopic photos and findings.  Sutures removed today  Discussed short and long term implications for the knee.  Discussed analgesics, ice, rest.  Encouraged home exercise program, physical therapy.  Follow-up in 4 weeks  XRays at follow up none    Arthroscopic findings of suprapatellar pouch synovitis, grade 4 [place] change of the patella, grade 4 of the trochlea grade 3 of medial femoral condyle, grade 3 of the medial tibial plateau, grade 3 of the lateral femoral condyle, complex tear medial meniscus body, chondrocalcinosis, small white zone lateral meniscus tear all discussed with patient in detail today    Robinson De La Cruz PA-C

## 2025-05-08 ENCOUNTER — APPOINTMENT (OUTPATIENT)
Dept: PRIMARY CARE | Facility: CLINIC | Age: 70
End: 2025-05-08
Payer: MEDICARE

## 2025-05-08 VITALS
OXYGEN SATURATION: 98 % | DIASTOLIC BLOOD PRESSURE: 74 MMHG | BODY MASS INDEX: 28.34 KG/M2 | HEART RATE: 62 BPM | TEMPERATURE: 96.6 F | WEIGHT: 154 LBS | SYSTOLIC BLOOD PRESSURE: 118 MMHG | RESPIRATION RATE: 16 BRPM | HEIGHT: 62 IN

## 2025-05-08 DIAGNOSIS — Z13.820 SCREENING FOR OSTEOPOROSIS: ICD-10-CM

## 2025-05-08 DIAGNOSIS — Z12.31 ENCOUNTER FOR SCREENING MAMMOGRAM FOR MALIGNANT NEOPLASM OF BREAST: ICD-10-CM

## 2025-05-08 DIAGNOSIS — Z13.6 ENCOUNTER FOR SCREENING FOR CORONARY ARTERY DISEASE: ICD-10-CM

## 2025-05-08 DIAGNOSIS — E78.5 DYSLIPIDEMIA: ICD-10-CM

## 2025-05-08 DIAGNOSIS — Z00.00 MEDICARE ANNUAL WELLNESS VISIT, SUBSEQUENT: Primary | ICD-10-CM

## 2025-05-08 DIAGNOSIS — C82.08 GRADE 1 FOLLICULAR LYMPHOMA OF LYMPH NODES OF MULTIPLE REGIONS (MULTI): ICD-10-CM

## 2025-05-08 DIAGNOSIS — C82.80: ICD-10-CM

## 2025-05-08 DIAGNOSIS — Z23 IMMUNIZATION DUE: ICD-10-CM

## 2025-05-08 PROCEDURE — 3008F BODY MASS INDEX DOCD: CPT | Performed by: INTERNAL MEDICINE

## 2025-05-08 PROCEDURE — G0439 PPPS, SUBSEQ VISIT: HCPCS | Performed by: INTERNAL MEDICINE

## 2025-05-08 PROCEDURE — 99214 OFFICE O/P EST MOD 30 MIN: CPT | Performed by: INTERNAL MEDICINE

## 2025-05-08 PROCEDURE — 90677 PCV20 VACCINE IM: CPT | Performed by: INTERNAL MEDICINE

## 2025-05-08 PROCEDURE — 1170F FXNL STATUS ASSESSED: CPT | Performed by: INTERNAL MEDICINE

## 2025-05-08 PROCEDURE — 1160F RVW MEDS BY RX/DR IN RCRD: CPT | Performed by: INTERNAL MEDICINE

## 2025-05-08 PROCEDURE — 1159F MED LIST DOCD IN RCRD: CPT | Performed by: INTERNAL MEDICINE

## 2025-05-08 PROCEDURE — G0009 ADMIN PNEUMOCOCCAL VACCINE: HCPCS | Performed by: INTERNAL MEDICINE

## 2025-05-08 ASSESSMENT — ACTIVITIES OF DAILY LIVING (ADL)
GROCERY_SHOPPING: INDEPENDENT
MANAGING_FINANCES: INDEPENDENT
DOING_HOUSEWORK: INDEPENDENT
DRESSING: INDEPENDENT
TAKING_MEDICATION: INDEPENDENT
BATHING: INDEPENDENT

## 2025-05-08 ASSESSMENT — PATIENT HEALTH QUESTIONNAIRE - PHQ9
SUM OF ALL RESPONSES TO PHQ9 QUESTIONS 1 AND 2: 0
2. FEELING DOWN, DEPRESSED OR HOPELESS: NOT AT ALL
1. LITTLE INTEREST OR PLEASURE IN DOING THINGS: NOT AT ALL

## 2025-05-08 NOTE — PROGRESS NOTES
"Subjective   Reason for Visit: Yoana Herzog is an 70 y.o. female here for a Medicare Wellness visit.     Past Medical, Surgical, and Family History reviewed and updated in chart.     Patient presents for her Medicare Annual Wellness Visit (AWV).  Patient had labs done 04/04/25  Last Mammogram 09/12/24  Dexa 09/11/23  Colonoscopy 10/13/22, q3y  Labs and test reviewed with patient , all question answered, further evaluation, if needed , discussed.    Had knee surgery , doing very well.      Patient Care Team:  Valdez Cooper MD as PCP - General (Internal Medicine)  Valdez Cooper MD as PCP - O Medicare Advantage PCP  Uma Stevenson MD as Consulting Physician (Hematology and Oncology)  DEMOND Goins as  (Oncology)     Review of Systems   All other systems reviewed and are negative.      Objective   Vitals:  /74 (BP Location: Left arm, Patient Position: Sitting, BP Cuff Size: Adult)   Pulse 62   Temp 35.9 °C (96.6 °F) (Temporal)   Resp 16   Ht 1.575 m (5' 2\")   Wt 69.9 kg (154 lb)   SpO2 98%   BMI 28.17 kg/m²       Physical Exam  Constitutional:       Appearance: Normal appearance.   HENT:      Head: Normocephalic and atraumatic.   Eyes:      Pupils: Pupils are equal, round, and reactive to light.   Cardiovascular:      Rate and Rhythm: Normal rate and regular rhythm.   Pulmonary:      Effort: Pulmonary effort is normal.      Breath sounds: Normal breath sounds.   Musculoskeletal:         General: Normal range of motion.      Cervical back: Normal range of motion and neck supple.   Skin:     General: Skin is warm.   Neurological:      General: No focal deficit present.      Mental Status: She is alert and oriented to person, place, and time.   Psychiatric:         Mood and Affect: Mood normal.         Behavior: Behavior normal.         Assessment & Plan  Encounter for screening mammogram for malignant neoplasm of breast    Orders:    BI mammo bilateral screening " tomosynthesis; Future    Screening for osteoporosis    Orders:    XR DEXA bone density; Future    Dyslipidemia  Cardiac Risk Assessment  More then 15 minutes were spent assessing and discussing cardiovascular risk was and, if needed, lifestyle modifications recommended, including nutritional choices, exercise, and elimination of habits contributing to risk. Aspirin use/disuse was discussed following the guidelines below.     Low dose ASA ( mg) should be considered:  If you have prior Heart Attack/Stroke/Peripheral vascular disease:  Generally recommend daily low dose aspirin unless extremely high bleeding risk (e.g., gastrointestinal).     If you do not have prior Heart Attack/Stroke/Peripheral vascular disease:    Age < 70 and your 10-year cardiovascular disease risk is >20%, use low dose Aspirin   Age >=70: Do not use Aspirin for prevention  I have discussed the cardiovascular risk and behavioral modification, nutrition, exercise and elimination of habits contributing to risk. We agreed to a plan how to reduce the risk.  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

## 2025-05-08 NOTE — ASSESSMENT & PLAN NOTE
Cardiac Risk Assessment  More then 15 minutes were spent assessing and discussing cardiovascular risk was and, if needed, lifestyle modifications recommended, including nutritional choices, exercise, and elimination of habits contributing to risk. Aspirin use/disuse was discussed following the guidelines below.     Low dose ASA ( mg) should be considered:  If you have prior Heart Attack/Stroke/Peripheral vascular disease:  Generally recommend daily low dose aspirin unless extremely high bleeding risk (e.g., gastrointestinal).     If you do not have prior Heart Attack/Stroke/Peripheral vascular disease:    Age < 70 and your 10-year cardiovascular disease risk is >20%, use low dose Aspirin   Age >=70: Do not use Aspirin for prevention  I have discussed the cardiovascular risk and behavioral modification, nutrition, exercise and elimination of habits contributing to risk. We agreed to a plan how to reduce the risk.  CV risk estimate calculates is  8.4 %  Reluctant  to start tx  Will get calcium score      Orders:    CT cardiac scoring wo IV contrast; Future

## 2025-05-16 ENCOUNTER — APPOINTMENT (OUTPATIENT)
Dept: INTEGRATIVE MEDICINE | Facility: CLINIC | Age: 70
End: 2025-05-16
Payer: MEDICARE

## 2025-05-21 ENCOUNTER — APPOINTMENT (OUTPATIENT)
Dept: ORTHOPEDIC SURGERY | Facility: CLINIC | Age: 70
End: 2025-05-21
Payer: MEDICARE

## 2025-05-21 DIAGNOSIS — Z87.828 S/P ARTHROSCOPIC PARTIAL MEDIAL MENISCECTOMY OF LEFT KNEE: ICD-10-CM

## 2025-05-21 DIAGNOSIS — Z98.890 S/P ARTHROSCOPIC PARTIAL MEDIAL MENISCECTOMY OF LEFT KNEE: ICD-10-CM

## 2025-05-21 DIAGNOSIS — Z98.890 S/P ARTHROSCOPIC PARTIAL LATERAL MENISCECTOMY OF LEFT KNEE: Primary | ICD-10-CM

## 2025-05-21 DIAGNOSIS — Z87.828 S/P ARTHROSCOPIC PARTIAL LATERAL MENISCECTOMY OF LEFT KNEE: Primary | ICD-10-CM

## 2025-05-21 PROCEDURE — 99024 POSTOP FOLLOW-UP VISIT: CPT | Performed by: STUDENT IN AN ORGANIZED HEALTH CARE EDUCATION/TRAINING PROGRAM

## 2025-05-21 NOTE — PROGRESS NOTES
Chief Complaint   Patient presents with    Left Knee - Follow-up     Arthroscopic partial medial and lateral meniscectomy 4/8 2025       History of Present Illness  Yoana is a 70-year-old female presenting today for 6-week follow-up after left knee arthroscopy.  Patient returns today noting mild to moderate pain.  Denies any calf pain or shortness of breath.  Patient is endorsing a persistent moderate amount of swelling and associated stiffness.  Does state that this was initially getting better however does seem to have plateaued over the last couple weeks.  Pain overall appropriate for postop course.  Denies any significant numbness or tingling to the distal lower extremity.  Does state that she has been able to increase her activities and is happy with her range of motion however is somewhat discouraged with her level of discomfort some days and swelling.      Exam  Moderate effusion  Healthy incisions - no active drainage  Good range of motion 0- 125  No calf swelling  Negative Stephen´s test  Distal neurovascular exam intact     Assessment  Patient status post left knee arthroscopy partial medial and lateral meniscectomy, patellofemoral chondroplasty, 6 weeks out     Plan  Reviewed short and long term implications for the knee.  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  Encouraged home exercise program, physical therapy.  Given some of her limitations still we will provide her with an order for physical therapy working on range of motion and strengthening/conditioning  Follow-up in 6 weeks  XRays at follow up none  If she fails to improve with physical therapy did discuss options for her given arthritis including intra-articular cortisone injections versus gel injections.  Patient does have allergy to cortisone injections in the past therefore we will refrain from these.  Did get some relief with  gels prior to surgery however did discuss this is an option given her symptoms postoperatively as well      Robinson De La Cruz PA-C

## 2025-05-23 ENCOUNTER — APPOINTMENT (OUTPATIENT)
Dept: INTEGRATIVE MEDICINE | Facility: CLINIC | Age: 70
End: 2025-05-23
Payer: MEDICARE

## 2025-05-23 DIAGNOSIS — M79.10 MYALGIA: ICD-10-CM

## 2025-05-23 DIAGNOSIS — M79.9 POSTURAL STRAIN: ICD-10-CM

## 2025-05-23 DIAGNOSIS — M99.02 SEGMENTAL AND SOMATIC DYSFUNCTION OF THORACIC REGION: ICD-10-CM

## 2025-05-23 DIAGNOSIS — M53.3 SACRAL BACK PAIN: ICD-10-CM

## 2025-05-23 DIAGNOSIS — M54.2 NECK PAIN: ICD-10-CM

## 2025-05-23 DIAGNOSIS — M99.03 SEGMENTAL AND SOMATIC DYSFUNCTION OF LUMBAR REGION: ICD-10-CM

## 2025-05-23 DIAGNOSIS — M99.01 SEGMENTAL AND SOMATIC DYSFUNCTION OF CERVICAL REGION: Primary | ICD-10-CM

## 2025-05-23 DIAGNOSIS — M99.04 SEGMENTAL AND SOMATIC DYSFUNCTION OF SACRAL REGION: ICD-10-CM

## 2025-05-23 PROCEDURE — 98941 CHIROPRACT MANJ 3-4 REGIONS: CPT

## 2025-05-23 NOTE — PROGRESS NOTES
Subjective   Patient ID: Yoana Herzog is a 70 y.o. female who presents May 23, 2025 for chiropractic care.    Medicare (5) VPCY    Today, the patient rates their degree of pain as a 3 out of 10 on the numeric pain rating scale.     Yoana is 6 weeks post knee surgery, she has been feeling much better. She mentions they cleaned up a lot of ligamentous damage that was present as well as stage 4 arthritis in the knee. She is doing physical therapy because she hit a plateau with improvements, and this is also going well. She has been noticing discomfort right side low back and SI area, also same tension in upper back and neck.   ________________________________________________  (SP)INITIAL INTAKE/SUBJECTIVE 12/12/22: She has seen chiropractic in the past. Her last adjustment was about 5 weeks ago. Is not that she is dissatisfied with the care she has been given, but she is hoping that our treatments will help her completely resolve the issue to the point where she does not need regular care.     Today her primary complaint is left neck and shoulder pain. 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 She also enjoys sewing and notices this pain does get worse after she has been doing a lot of sewing. She will occasionally feel tingling into the left hand most notably in the #3 through 5 fingers. She does have some headaches but these are localized to the suboccipital region and do not radiate into the forehead eyes or temples. In the past her physical therapist and chiropractors have done manual stretching of the left shoulder and done adjustments which have helped.     Her secondary pain complaints are in the right hip glued and sacral pains. She does sleep with a pillow between the knees. She is unsure why but the last 6 to 12 months this has been getting progressively worse. She will occasionally have pain in the hamstring  region but this is rare. She will occasionally feel sharp intense pain in the hip or the lower back but this only typically lasts a few seconds. Generally is more of a dull aching pain. The point of max tenderness is localized to the middle of the right butt cheek. No one has worked on this or massage it in the past.      Objective   Physical Exam  Neurological:      General: No focal deficit present.      Mental Status: She is alert and oriented to person, place, and time.      Cranial Nerves: No dysarthria or facial asymmetry.      Sensory: Sensation is intact.      Motor: Motor function is intact.      Coordination: Coordination is intact.      Gait: Gait is intact.       Palpation of the following region(s) revealed:  Cervical: Suboccipitals bilateral, muscular hypertonicity.  Scalenes bilateral, muscular hypertonicity.  Upper trapezius bilateral, muscular hypertonicity.  Levator scapulae bilateral, muscular hypertonicity.  Cervical paraspinals bilateral, muscular hypertonicity.  Thoracic: Thoracic paraspinals bilateral, muscular hypertonicity.  Middle trapezius bilateral, muscular hypertonicity.  Lower trapezius bilateral, muscular hypertonicity.  Rhomboids bilateral, muscular hypertonicity.  Lumbar: Lumbar paraspinals bilateral, muscular hypertonicity.  Quadratus lumborum bilateral, muscular hypertonicity.  Gluteal bilateral, muscular hypertonicity.  Piriformis bilateral, muscular hypertonicity.    Segmental Joint(s): Segmental joint dysfunction was assessed with motion palpation and is identified in the following areas:  Cervical : C3 C5  Thoracic : T4, T5, T6, T7, and T8  Lumbopelvic / Sacral SIJ : L3, L4, L5/S1, and R SIJ    Assessment/Plan   Today's Treatment Included: Spinal manipulation to the following regions of segmental dysfunction identified on examination, using age-appropriate and injury specific force. Segmental Joint(s) Cervical : C3 C5 Segmental Joint(s) Thoracic : T4, T5, T6, T7, and T8  Segmental Joint(s) Lumbopelvic/Sacral SIJ : L3, L4, L5/S1, and R SIJ  Chiropractic manipulation treatment techniques utilized: Diversified CMT, Pelvic drop table technique, and Flexion-distraction technique  Soft tissue mobilization techniques utilized during treatment: Pin and Stretch and Ischemic compression    Soft-tissue mobilization was performed in the following areas:   Suboccipital bilateral, Cervical paraspinal mm. bilateral, Scalenes bilateral, Upper Trapezius bilateral, and Levator Scap. bilateral  Thoracic Paraspinal mm. bilateral, Middle Trapezius bilateral, Lower Trapezius bilateral, and Rhomboids bilateral  Lumbar Paraspinal mm. bilateral, Quadratus Lumborum bilateral, Gluteal mm. Glute. Max.  and Glute. Med. bilateral, and Piriformis bilateral    The patient tolerated today's treatment with little or no additional discomfort and was instructed to contact the office for questions or concerns.

## 2025-05-27 ENCOUNTER — APPOINTMENT (OUTPATIENT)
Dept: HEMATOLOGY/ONCOLOGY | Facility: CLINIC | Age: 70
End: 2025-05-27
Payer: MEDICARE

## 2025-05-28 ENCOUNTER — INFUSION (OUTPATIENT)
Dept: HEMATOLOGY/ONCOLOGY | Facility: CLINIC | Age: 70
End: 2025-05-28
Payer: MEDICARE

## 2025-05-28 VITALS
RESPIRATION RATE: 16 BRPM | WEIGHT: 153.44 LBS | TEMPERATURE: 97.5 F | DIASTOLIC BLOOD PRESSURE: 69 MMHG | HEART RATE: 61 BPM | BODY MASS INDEX: 28.24 KG/M2 | HEIGHT: 62 IN | OXYGEN SATURATION: 98 % | SYSTOLIC BLOOD PRESSURE: 114 MMHG

## 2025-05-28 DIAGNOSIS — C82.08 GRADE 1 FOLLICULAR LYMPHOMA OF LYMPH NODES OF MULTIPLE REGIONS (MULTI): ICD-10-CM

## 2025-05-28 LAB
ALBUMIN SERPL BCP-MCNC: 4.3 G/DL (ref 3.4–5)
ALP SERPL-CCNC: 57 U/L (ref 33–136)
ALT SERPL W P-5'-P-CCNC: 10 U/L (ref 7–45)
ANION GAP SERPL CALC-SCNC: 11 MMOL/L (ref 10–20)
AST SERPL W P-5'-P-CCNC: 15 U/L (ref 9–39)
BASOPHILS # BLD AUTO: 0.05 X10*3/UL (ref 0–0.1)
BASOPHILS NFR BLD AUTO: 0.9 %
BILIRUB SERPL-MCNC: 0.5 MG/DL (ref 0–1.2)
BUN SERPL-MCNC: 19 MG/DL (ref 6–23)
CALCIUM SERPL-MCNC: 9 MG/DL (ref 8.6–10.3)
CHLORIDE SERPL-SCNC: 103 MMOL/L (ref 98–107)
CO2 SERPL-SCNC: 28 MMOL/L (ref 21–32)
CREAT SERPL-MCNC: 0.58 MG/DL (ref 0.5–1.05)
EGFRCR SERPLBLD CKD-EPI 2021: >90 ML/MIN/1.73M*2
EOSINOPHIL # BLD AUTO: 0.11 X10*3/UL (ref 0–0.4)
EOSINOPHIL NFR BLD AUTO: 1.9 %
ERYTHROCYTE [DISTWIDTH] IN BLOOD BY AUTOMATED COUNT: 13.2 % (ref 11.5–14.5)
GLUCOSE SERPL-MCNC: 92 MG/DL (ref 74–99)
HCT VFR BLD AUTO: 38.8 % (ref 36–46)
HGB BLD-MCNC: 12.2 G/DL (ref 12–16)
IMM GRANULOCYTES # BLD AUTO: 0 X10*3/UL (ref 0–0.5)
IMM GRANULOCYTES NFR BLD AUTO: 0 % (ref 0–0.9)
LDH SERPL L TO P-CCNC: 170 U/L (ref 84–246)
LYMPHOCYTES # BLD AUTO: 1.06 X10*3/UL (ref 0.8–3)
LYMPHOCYTES NFR BLD AUTO: 18 %
MCH RBC QN AUTO: 27.2 PG (ref 26–34)
MCHC RBC AUTO-ENTMCNC: 31.4 G/DL (ref 32–36)
MCV RBC AUTO: 86 FL (ref 80–100)
MONOCYTES # BLD AUTO: 0.51 X10*3/UL (ref 0.05–0.8)
MONOCYTES NFR BLD AUTO: 8.7 %
NEUTROPHILS # BLD AUTO: 4.15 X10*3/UL (ref 1.6–5.5)
NEUTROPHILS NFR BLD AUTO: 70.5 %
PLATELET # BLD AUTO: 225 X10*3/UL (ref 150–450)
POTASSIUM SERPL-SCNC: 3.7 MMOL/L (ref 3.5–5.3)
PROT SERPL-MCNC: 6.4 G/DL (ref 6.4–8.2)
RBC # BLD AUTO: 4.49 X10*6/UL (ref 4–5.2)
SODIUM SERPL-SCNC: 138 MMOL/L (ref 136–145)
URATE SERPL-MCNC: 3.6 MG/DL (ref 2.3–6.7)
WBC # BLD AUTO: 5.9 X10*3/UL (ref 4.4–11.3)

## 2025-05-28 PROCEDURE — 80053 COMPREHEN METABOLIC PANEL: CPT

## 2025-05-28 PROCEDURE — 2500000004 HC RX 250 GENERAL PHARMACY W/ HCPCS (ALT 636 FOR OP/ED): Mod: JZ | Performed by: INTERNAL MEDICINE

## 2025-05-28 PROCEDURE — 83615 LACTATE (LD) (LDH) ENZYME: CPT

## 2025-05-28 PROCEDURE — 96413 CHEMO IV INFUSION 1 HR: CPT

## 2025-05-28 PROCEDURE — 84550 ASSAY OF BLOOD/URIC ACID: CPT

## 2025-05-28 PROCEDURE — 2500000001 HC RX 250 WO HCPCS SELF ADMINISTERED DRUGS (ALT 637 FOR MEDICARE OP): Performed by: INTERNAL MEDICINE

## 2025-05-28 PROCEDURE — 96375 TX/PRO/DX INJ NEW DRUG ADDON: CPT | Mod: INF

## 2025-05-28 PROCEDURE — 85025 COMPLETE CBC W/AUTO DIFF WBC: CPT

## 2025-05-28 PROCEDURE — 96415 CHEMO IV INFUSION ADDL HR: CPT

## 2025-05-28 RX ORDER — HEPARIN 100 UNIT/ML
500 SYRINGE INTRAVENOUS AS NEEDED
OUTPATIENT
Start: 2025-05-28

## 2025-05-28 RX ORDER — EPINEPHRINE 0.3 MG/.3ML
0.3 INJECTION SUBCUTANEOUS EVERY 5 MIN PRN
Status: DISCONTINUED | OUTPATIENT
Start: 2025-05-28 | End: 2025-05-28 | Stop reason: HOSPADM

## 2025-05-28 RX ORDER — DIPHENHYDRAMINE HYDROCHLORIDE 50 MG/ML
50 INJECTION, SOLUTION INTRAMUSCULAR; INTRAVENOUS AS NEEDED
Status: DISCONTINUED | OUTPATIENT
Start: 2025-05-28 | End: 2025-05-28 | Stop reason: HOSPADM

## 2025-05-28 RX ORDER — FAMOTIDINE 10 MG/ML
40 INJECTION, SOLUTION INTRAVENOUS ONCE
Status: COMPLETED | OUTPATIENT
Start: 2025-05-28 | End: 2025-05-28

## 2025-05-28 RX ORDER — FAMOTIDINE 10 MG/ML
20 INJECTION, SOLUTION INTRAVENOUS ONCE AS NEEDED
Status: DISCONTINUED | OUTPATIENT
Start: 2025-05-28 | End: 2025-05-28 | Stop reason: HOSPADM

## 2025-05-28 RX ORDER — ACETAMINOPHEN 325 MG/1
650 TABLET ORAL ONCE
Status: COMPLETED | OUTPATIENT
Start: 2025-05-28 | End: 2025-05-28

## 2025-05-28 RX ORDER — ONDANSETRON HYDROCHLORIDE 2 MG/ML
4 INJECTION, SOLUTION INTRAVENOUS ONCE
Status: COMPLETED | OUTPATIENT
Start: 2025-05-28 | End: 2025-05-28

## 2025-05-28 RX ORDER — PROCHLORPERAZINE EDISYLATE 5 MG/ML
10 INJECTION INTRAMUSCULAR; INTRAVENOUS EVERY 6 HOURS PRN
Status: DISCONTINUED | OUTPATIENT
Start: 2025-05-28 | End: 2025-05-28 | Stop reason: HOSPADM

## 2025-05-28 RX ORDER — PROCHLORPERAZINE MALEATE 10 MG
10 TABLET ORAL EVERY 6 HOURS PRN
Status: DISCONTINUED | OUTPATIENT
Start: 2025-05-28 | End: 2025-05-28 | Stop reason: HOSPADM

## 2025-05-28 RX ORDER — HEPARIN SODIUM,PORCINE/PF 10 UNIT/ML
50 SYRINGE (ML) INTRAVENOUS AS NEEDED
OUTPATIENT
Start: 2025-05-28

## 2025-05-28 RX ORDER — ALBUTEROL SULFATE 0.83 MG/ML
3 SOLUTION RESPIRATORY (INHALATION) AS NEEDED
Status: DISCONTINUED | OUTPATIENT
Start: 2025-05-28 | End: 2025-05-28 | Stop reason: HOSPADM

## 2025-05-28 RX ADMIN — SODIUM CHLORIDE 600 MG: 9 INJECTION, SOLUTION INTRAVENOUS at 09:58

## 2025-05-28 RX ADMIN — DEXAMETHASONE SODIUM PHOSPHATE 16 MG: 10 INJECTION, SOLUTION INTRAMUSCULAR; INTRAVENOUS at 09:08

## 2025-05-28 RX ADMIN — FAMOTIDINE 40 MG: 10 INJECTION INTRAVENOUS at 09:08

## 2025-05-28 RX ADMIN — ACETAMINOPHEN 650 MG: 325 TABLET ORAL at 09:05

## 2025-05-28 RX ADMIN — DIPHENHYDRAMINE HYDROCHLORIDE 50 MG: 50 INJECTION, SOLUTION INTRAMUSCULAR; INTRAVENOUS at 09:31

## 2025-05-28 RX ADMIN — ONDANSETRON 4 MG: 2 INJECTION INTRAMUSCULAR; INTRAVENOUS at 09:08

## 2025-05-28 ASSESSMENT — PAIN SCALES - GENERAL: PAINLEVEL_OUTOF10: 0-NO PAIN

## 2025-05-28 NOTE — PROGRESS NOTES
Infusion Rates for Rituximab (600 mg in 327 mL)    Increase every 30 minutes if no adverse events.    100 mg/hr  54.5 mL/hr  27.3 mL    200 mg/hr  109 mL/hr  54.5 mL    300 mg/hr  163.5 mL/hr  81.8 mL    400 mg/hr  218 mL/hr  163.5 mL (until complete)

## 2025-06-24 ENCOUNTER — APPOINTMENT (OUTPATIENT)
Dept: INTEGRATIVE MEDICINE | Facility: CLINIC | Age: 70
End: 2025-06-24
Payer: MEDICARE

## 2025-06-24 DIAGNOSIS — M53.3 SACRAL BACK PAIN: ICD-10-CM

## 2025-06-24 DIAGNOSIS — M79.10 MYALGIA: ICD-10-CM

## 2025-06-24 DIAGNOSIS — M99.04 SEGMENTAL AND SOMATIC DYSFUNCTION OF SACRAL REGION: ICD-10-CM

## 2025-06-24 DIAGNOSIS — M99.03 SEGMENTAL AND SOMATIC DYSFUNCTION OF LUMBAR REGION: ICD-10-CM

## 2025-06-24 DIAGNOSIS — M79.9 POSTURAL STRAIN: ICD-10-CM

## 2025-06-24 DIAGNOSIS — M99.02 SEGMENTAL AND SOMATIC DYSFUNCTION OF THORACIC REGION: ICD-10-CM

## 2025-06-24 DIAGNOSIS — M54.2 NECK PAIN: ICD-10-CM

## 2025-06-24 DIAGNOSIS — M99.01 SEGMENTAL AND SOMATIC DYSFUNCTION OF CERVICAL REGION: Primary | ICD-10-CM

## 2025-06-24 PROCEDURE — 98941 CHIROPRACT MANJ 3-4 REGIONS: CPT

## 2025-06-24 NOTE — PROGRESS NOTES
Subjective   Patient ID: Yoana Herzog is a 70 y.o. female who presents June 24, 2025 for chiropractic care.    Medicare (6) VPCY    Today, the patient rates their degree of pain as a 3 out of 10 on the numeric pain rating scale.     Yoana feels like her knee is about 80-90% healed, she is still having some pain with stairs and other activities, certain movements she is able to do in the pool but not on land yet, like squats. Still having moderate pain in her low back due to the surgery, and tension in the upper back and neck is also present. We discussed dry needling around the knee and leg in the future once she is cleared by her surgeon, and she has been doing physical therapy for strengthening as well still.   ________________________________________________  (SP)INITIAL INTAKE/SUBJECTIVE 12/12/22: She has seen chiropractic in the past. Her last adjustment was about 5 weeks ago. Is not that she is dissatisfied with the care she has been given, but she is hoping that our treatments will help her completely resolve the issue to the point where she does not need regular care.     Today her primary complaint is left neck and shoulder pain. 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 She also enjoys sewing and notices this pain does get worse after she has been doing a lot of sewing. She will occasionally feel tingling into the left hand most notably in the #3 through 5 fingers. She does have some headaches but these are localized to the suboccipital region and do not radiate into the forehead eyes or temples. In the past her physical therapist and chiropractors have done manual stretching of the left shoulder and done adjustments which have helped.     Her secondary pain complaints are in the right hip glued and sacral pains. She does sleep with a pillow between the knees. She is unsure why but the last 6 to 12 months this  has been getting progressively worse. She will occasionally have pain in the hamstring region but this is rare. She will occasionally feel sharp intense pain in the hip or the lower back but this only typically lasts a few seconds. Generally is more of a dull aching pain. The point of max tenderness is localized to the middle of the right butt cheek. No one has worked on this or massage it in the past.      Objective   Physical Exam  Neurological:      General: No focal deficit present.      Mental Status: She is alert and oriented to person, place, and time.      Cranial Nerves: No dysarthria or facial asymmetry.      Sensory: Sensation is intact.      Motor: Motor function is intact.      Coordination: Coordination is intact.      Gait: Gait is intact.     Palpation of the following region(s) revealed:  Cervical: Suboccipitals bilateral, muscular hypertonicity.  Scalenes bilateral, muscular hypertonicity.  Upper trapezius bilateral, muscular hypertonicity.  Levator scapulae bilateral, muscular hypertonicity.  Cervical paraspinals bilateral, muscular hypertonicity.  Thoracic: Thoracic paraspinals bilateral, muscular hypertonicity.  Middle trapezius bilateral, muscular hypertonicity.  Lower trapezius bilateral, muscular hypertonicity.  Rhomboids bilateral, muscular hypertonicity.  Lumbar: Lumbar paraspinals bilateral, muscular hypertonicity.  Quadratus lumborum bilateral, muscular hypertonicity.  Gluteal bilateral, muscular hypertonicity.  Piriformis bilateral, muscular hypertonicity.    Segmental Joint(s): Segmental joint dysfunction was assessed with motion palpation and is identified in the following areas:  Cervical : C2 C5  Thoracic : T6, T7, T8, and T10  Lumbopelvic / Sacral SIJ : L2, L3, L4, L5/S1, and R SIJ    Assessment/Plan   Today's Treatment Included: Spinal manipulation to the following regions of segmental dysfunction identified on examination, using age-appropriate and injury specific force. Segmental  Joint(s) Cervical : C2 C5 Segmental Joint(s) Thoracic : T6, T7, T8, and T10 Segmental Joint(s) Lumbopelvic/Sacral SIJ : L2, L3, L4, L5/S1, and R SIJ  Chiropractic manipulation treatment techniques utilized: Diversified CMT, Pelvic drop table technique, and Flexion-distraction technique  Soft tissue mobilization techniques utilized during treatment: Pin and Stretch and Ischemic compression    Soft-tissue mobilization was performed in the following areas:   Suboccipital bilateral, Cervical paraspinal mm. bilateral, Scalenes bilateral, Upper Trapezius bilateral, and Levator Scap. bilateral  Thoracic Paraspinal mm. bilateral, Middle Trapezius bilateral, Lower Trapezius bilateral, and Rhomboids bilateral  Lumbar Paraspinal mm. bilateral, Quadratus Lumborum bilateral, Gluteal mm. Glute. Max.  and Glute. Med. bilateral, and Piriformis bilateral    The patient tolerated today's treatment with little or no additional discomfort and was instructed to contact the office for questions or concerns.

## 2025-07-02 ENCOUNTER — APPOINTMENT (OUTPATIENT)
Dept: ORTHOPEDIC SURGERY | Facility: CLINIC | Age: 70
End: 2025-07-02
Payer: MEDICARE

## 2025-07-02 DIAGNOSIS — Z98.890 S/P ARTHROSCOPIC PARTIAL LATERAL MENISCECTOMY OF LEFT KNEE: ICD-10-CM

## 2025-07-02 DIAGNOSIS — Z87.828 S/P ARTHROSCOPIC PARTIAL LATERAL MENISCECTOMY OF LEFT KNEE: ICD-10-CM

## 2025-07-02 DIAGNOSIS — M17.12 PRIMARY LOCALIZED OSTEOARTHRITIS OF LEFT KNEE: Primary | ICD-10-CM
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
## 2025-07-17 ENCOUNTER — HOSPITAL ENCOUNTER (OUTPATIENT)
Dept: RADIOLOGY | Facility: HOSPITAL | Age: 70
Discharge: HOME | End: 2025-07-17
Payer: MEDICARE

## 2025-07-17 ENCOUNTER — APPOINTMENT (OUTPATIENT)
Dept: LAB | Facility: CLINIC | Age: 70
End: 2025-07-17
Payer: MEDICARE

## 2025-07-17 ENCOUNTER — LAB (OUTPATIENT)
Dept: LAB | Facility: CLINIC | Age: 70
End: 2025-07-17
Payer: MEDICARE

## 2025-07-17 DIAGNOSIS — K86.2 PANCREATIC CYST (HHS-HCC): ICD-10-CM

## 2025-07-17 DIAGNOSIS — C82.08 GRADE 1 FOLLICULAR LYMPHOMA OF LYMPH NODES OF MULTIPLE REGIONS (MULTI): ICD-10-CM

## 2025-07-17 LAB
ALBUMIN SERPL BCP-MCNC: 4.7 G/DL (ref 3.4–5)
ALP SERPL-CCNC: 58 U/L (ref 33–136)
ALT SERPL W P-5'-P-CCNC: 10 U/L (ref 7–45)
ANION GAP SERPL CALC-SCNC: 12 MMOL/L (ref 10–20)
AST SERPL W P-5'-P-CCNC: 16 U/L (ref 9–39)
BASOPHILS # BLD AUTO: 0.04 X10*3/UL (ref 0–0.1)
BASOPHILS NFR BLD AUTO: 0.7 %
BILIRUB SERPL-MCNC: 0.6 MG/DL (ref 0–1.2)
BUN SERPL-MCNC: 19 MG/DL (ref 6–23)
CALCIUM SERPL-MCNC: 9.4 MG/DL (ref 8.6–10.3)
CHLORIDE SERPL-SCNC: 104 MMOL/L (ref 98–107)
CO2 SERPL-SCNC: 29 MMOL/L (ref 21–32)
CREAT SERPL-MCNC: 0.65 MG/DL (ref 0.5–1.05)
EGFRCR SERPLBLD CKD-EPI 2021: >90 ML/MIN/1.73M*2
EOSINOPHIL # BLD AUTO: 0.11 X10*3/UL (ref 0–0.4)
EOSINOPHIL NFR BLD AUTO: 1.9 %
ERYTHROCYTE [DISTWIDTH] IN BLOOD BY AUTOMATED COUNT: 13 % (ref 11.5–14.5)
GLUCOSE SERPL-MCNC: 84 MG/DL (ref 74–99)
HCT VFR BLD AUTO: 43.7 % (ref 36–46)
HGB BLD-MCNC: 13.6 G/DL (ref 12–16)
IMM GRANULOCYTES # BLD AUTO: 0.01 X10*3/UL (ref 0–0.5)
IMM GRANULOCYTES NFR BLD AUTO: 0.2 % (ref 0–0.9)
LDH SERPL L TO P-CCNC: 159 U/L (ref 84–246)
LYMPHOCYTES # BLD AUTO: 1.12 X10*3/UL (ref 0.8–3)
LYMPHOCYTES NFR BLD AUTO: 19.5 %
MCH RBC QN AUTO: 26.9 PG (ref 26–34)
MCHC RBC AUTO-ENTMCNC: 31.1 G/DL (ref 32–36)
MCV RBC AUTO: 86 FL (ref 80–100)
MONOCYTES # BLD AUTO: 0.61 X10*3/UL (ref 0.05–0.8)
MONOCYTES NFR BLD AUTO: 10.6 %
NEUTROPHILS # BLD AUTO: 3.85 X10*3/UL (ref 1.6–5.5)
NEUTROPHILS NFR BLD AUTO: 67.1 %
PLATELET # BLD AUTO: 226 X10*3/UL (ref 150–450)
POTASSIUM SERPL-SCNC: 4 MMOL/L (ref 3.5–5.3)
PROT SERPL-MCNC: 6.9 G/DL (ref 6.4–8.2)
RBC # BLD AUTO: 5.06 X10*6/UL (ref 4–5.2)
SODIUM SERPL-SCNC: 141 MMOL/L (ref 136–145)
URATE SERPL-MCNC: 3.6 MG/DL (ref 2.3–6.7)
WBC # BLD AUTO: 5.7 X10*3/UL (ref 4.4–11.3)

## 2025-07-17 PROCEDURE — 85025 COMPLETE CBC W/AUTO DIFF WBC: CPT

## 2025-07-17 PROCEDURE — 83615 LACTATE (LD) (LDH) ENZYME: CPT

## 2025-07-17 PROCEDURE — 84550 ASSAY OF BLOOD/URIC ACID: CPT

## 2025-07-17 PROCEDURE — 80053 COMPREHEN METABOLIC PANEL: CPT

## 2025-07-17 PROCEDURE — 36415 COLL VENOUS BLD VENIPUNCTURE: CPT

## 2025-07-17 PROCEDURE — 74177 CT ABD & PELVIS W/CONTRAST: CPT

## 2025-07-17 PROCEDURE — 2550000001 HC RX 255 CONTRASTS: Performed by: INTERNAL MEDICINE

## 2025-07-17 RX ADMIN — IOHEXOL 75 ML: 350 INJECTION, SOLUTION INTRAVENOUS at 10:54

## 2025-07-18 NOTE — PROGRESS NOTES
Patient ID: Yoana Hezrog is a 70 y.o. female.    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 Yoana Herzog is a 70 y.o. female presenting for follow-up for follicular lymphoma.  No new major complaints.  She does have a pain and is considering an possible knee replacement in the future.  Underwent steroid injections that did provide some relief.  Patient's past medical history, surgical history, family history and social history reviewed.    Review of Systems:   Review of Systems:    Positive per HPI, otherwise negative.     Objective    There were no vitals taken for this visit.     Physical Exam  Gen: appears well in clinic, NAD  HEENT: atraumatic head, normocephalic, EOMI, conjunctiva normal  LUNG: no increased WOB, CTAB  CV: No JVD. RRR  GI: soft, NT, ND  LE: no LE edema  Skin: no obvious rashes or lesions on visible skin  Neuro: interactive, no focal deficits noted  Psych: normal mood and affect    Performance Status:  Symptomatic; fully ambulatory    Labs/Imaging/Pathology: personally reviewed reports and images in Epic electronic medical record system. Pertinent results as it related to the plan represented in below in assessment and plan.     Assessment/Plan    1. Follicular lymphoma grade 1-2 in the right submandibular gland, staging pending     - Patient initially presented to Dr. Douglass from dental after finding a new mass in her right neck.   - We discussed excision biopsy consistent with classic follicular lymphoma grade 1-2.   - We discussed we will plan for a CT C/A/P to complete staging.  - If there is more lymphadenopathy than expected we could consider a PET CT to  ensure there is not another area that is more FDG avid.  - Recent CBC and CMP unremarkable.   - I do not suspect bone marrow involvement.   - We will plan for a phone visit on 1/25/24 end of day.   - I will discuss at Tumor Board if there is any role for any radiation therapy.   - RTC for phone visit on 1/25/24 with scans on 1/22/24.      1/25/24: Telephone call  - given some LAD below the neck on recent CT 1/22/23  - discussed at  this AM 1/25/24 and recommended PET/CT  - PET/CT in 2 weeks     2/12/24: Telephone call   - discussed office visit to consider treatment given some increased infections rather than watch and wait approach  - would consider weekly rituximab x4      2/22/24:  - Overall, we discussed given she does have bone involvement I do recommend treatment.   - We discussed she would be considered stage 4 with her lymphoma and she is having symptoms with bone pain.  - We discussed given she has been minimally asymptomatic we could start with weekly rituximab rather than bendamustine/rituximab as she is very nervous about side effects from traditional chemo. We reviewed side effects and consent signed. We will plan to start Tuesday.  - Labs completed yesterday. Hepatitis B core antibody nonreactive.    - We will plan for a PET CT 7 weeks after her last cycle and follow-up with me 8 weeks post treatment for consideration of maintenance therapy and ensuring response versus addition of chemotherapy.  - RTC with me 8 weeks post.     3/20/2024:   - Presents for 4th and final cycle of weekly Rituxan  - Had reaction with cycle 1  - Had sore throat earlier in the week but urgent care swabbed for multiple viruses which came back negative, patient reports significant improvement of symptoms with allergy medications  - Since she is feeling better and had negative testing and denies fevers/chills/significant fatigue or appetite loss I do feel she can proceed with treatment today if labs are stable and patient  agrees with plan as she does not want to delay   - Labs reviewed and stable, patient went on to get treatment as planned   - She is already scheduled to RTC in 8 weeks to review PET scan scheduled in 7 weeks      9/13/24:  - No new lumps or bumps.  - No changes in labs with no new cytopenias.  - No new B symptoms.  - We discussed that ultimately her disease appears to be well controlled.   - At this point we will continue maintenance rituximab.  - Will plan for repeat scans in December.  - Follow-up 11/8/24 for treatment only.   - RTC with me in 16 weeks after scans.     12/30/24: Telephone visit   - Will hold off on rituximab, given there is concern it is affecting her teeth.   - We also reviewed that maintenance Rituximab does not improve survival but can help with progression free before next line of treatment.  - At this time, she also wishes to hold off.  - Recent CT scan did not show any worsening lymphadenopathy, but it did show IPMN will refer her to pancreatic clinic with Nya Thapa.   - No other new concerning findings or B symptoms   - RTC in three months and at that point we will reconsider restarting rituximab.    3/31/25:   - We reviewed lumps in her belly which appear to be subcutaneaous fat   - We reviewed MRCP images and also could see areas she was describing in subcutaneous tissue   - No palpable lymphadenopathy, no splenomegaly   - Given pancreatic cysts, will plan for a CT with pancreatic protocol in July, which would be 6 months from MRCP   - No contraindications to proceeding with Rituximab  - will continue to monitor for toxicity   - Labs reviewed   - RTC in 2 months for treatment only, 4 months with Dr. Stevenson and treatment, CT prior    7/21/25:  -Reviewed CT of the abdomen pelvis which shows cystic pancreas is stable  - Continues to tolerate rituximab without any major toxicity   - No new B symptoms or lymphadenopathy   - CBC, CMP LDH unremarkable  - Will plan to continue maintenance  rituximab last dose end of April  - will repeat imaging likely in July 1 year from now to continue to follow pancreatic cyst 3 months after rituximab has been completed  - Will plan for Rituxan only in 2 months and with a 4 months, if doing well can do treatment only in 6 months and follow-up with me in 8 months       Reviewed ongoing medical problems and how they relate to her follicular lymphoma, will continue long term monitoring.    RTC in 2 mo for ritux, with Alexia in 4 mo  This note has been transcribed using a medical scribe and there is a possibility of unintentional typing misprints    Diagnoses and all orders for this visit:  Grade 1 follicular lymphoma of lymph nodes of multiple regions (Multi)  -     Clinic Appointment Request  -     Infusion Appointment Request; Future  -     CBC and Auto Differential; Future  -     Comprehensive metabolic panel; Future  -     Lactate dehydrogenase; Future  -     Uric Acid; Future  -     Infusion Appointment Request; Future  -     CBC and Auto Differential; Future  -     Comprehensive metabolic panel; Future  -     Lactate dehydrogenase; Future  -     Uric Acid; Future  -     Infusion Appointment Request; Future  -     CBC and Auto Differential; Future  -     Comprehensive metabolic panel; Future  -     Lactate dehydrogenase; Future  -     Uric Acid; Future  -     Clinic Appointment Request; Future  -     Infusion Appointment Request; Future  -     CBC and Auto Differential; Future  -     Comprehensive metabolic panel; Future  -     Lactate dehydrogenase; Future  -     Uric Acid; Future  -     Infusion Appointment Request; Future  -     CBC and Auto Differential; Future  -     Comprehensive metabolic panel; Future  -     Lactate dehydrogenase; Future  -     Uric Acid; Future  -     Clinic Appointment Request ALEXIA PITT; Future  Other orders  -     diphenhydrAMINE (BENADryl) 50 mg in sodium chloride 0.9% 50 mL IV  -     dexAMETHasone (Decadron) 8 mg in dextrose 5%  50 mL IV  -     ondansetron (Zofran) injection 4 mg  -     famotidine PF (Pepcid) injection 40 mg  -     acetaminophen (Tylenol) tablet 650 mg  -     prochlorperazine (Compazine) tablet 10 mg  -     prochlorperazine (Compazine) injection 10 mg  -     riTUXimab-pvvr (Ruxience) 600 mg in sodium chloride 0.9% 310 mL IV  -     sodium chloride 0.9 % bolus 500 mL  -     dextrose 5 % in water (D5W) bolus 500 mL  -     diphenhydrAMINE (BENADryl) injection 50 mg  -     methylPREDNISolone sod succinate (SOLU-Medrol) 40 mg/mL injection 40 mg  -     famotidine PF (Pepcid) injection 20 mg  -     EPINEPHrine (Epipen) injection syringe 0.3 mg  -     albuterol 2.5 mg /3 mL (0.083 %) nebulizer solution 3 mL  -     diphenhydrAMINE (BENADryl) 50 mg in sodium chloride 0.9% 50 mL IV  -     dexAMETHasone (Decadron) 8 mg in dextrose 5% 50 mL IV  -     ondansetron (Zofran) injection 4 mg  -     famotidine PF (Pepcid) injection 40 mg  -     acetaminophen (Tylenol) tablet 650 mg  -     prochlorperazine (Compazine) tablet 10 mg  -     prochlorperazine (Compazine) injection 10 mg  -     riTUXimab-pvvr (Ruxience) 600 mg in sodium chloride 0.9% 310 mL IV  -     sodium chloride 0.9 % bolus 500 mL  -     dextrose 5 % in water (D5W) bolus 500 mL  -     diphenhydrAMINE (BENADryl) injection 50 mg  -     methylPREDNISolone sod succinate (SOLU-Medrol) 40 mg/mL injection 40 mg  -     famotidine PF (Pepcid) injection 20 mg  -     EPINEPHrine (Epipen) injection syringe 0.3 mg  -     albuterol 2.5 mg /3 mL (0.083 %) nebulizer solution 3 mL  -     diphenhydrAMINE (BENADryl) 50 mg in sodium chloride 0.9% 50 mL IV  -     dexAMETHasone (Decadron) 8 mg in dextrose 5% 50 mL IV  -     ondansetron (Zofran) injection 4 mg  -     famotidine PF (Pepcid) injection 40 mg  -     acetaminophen (Tylenol) tablet 650 mg  -     prochlorperazine (Compazine) tablet 10 mg  -     prochlorperazine (Compazine) injection 10 mg  -     riTUXimab-pvvr (Ruxience) 600 mg in sodium  chloride 0.9% 310 mL IV  -     sodium chloride 0.9 % bolus 500 mL  -     dextrose 5 % in water (D5W) bolus 500 mL  -     diphenhydrAMINE (BENADryl) injection 50 mg  -     methylPREDNISolone sod succinate (SOLU-Medrol) 40 mg/mL injection 40 mg  -     famotidine PF (Pepcid) injection 20 mg  -     EPINEPHrine (Epipen) injection syringe 0.3 mg  -     albuterol 2.5 mg /3 mL (0.083 %) nebulizer solution 3 mL  -     diphenhydrAMINE (BENADryl) 50 mg in sodium chloride 0.9% 50 mL IV  -     dexAMETHasone (Decadron) 8 mg in dextrose 5% 50 mL IV  -     ondansetron (Zofran) injection 4 mg  -     famotidine PF (Pepcid) injection 40 mg  -     acetaminophen (Tylenol) tablet 650 mg  -     prochlorperazine (Compazine) tablet 10 mg  -     prochlorperazine (Compazine) injection 10 mg  -     riTUXimab-pvvr (Ruxience) 600 mg in sodium chloride 0.9% 310 mL IV  -     sodium chloride 0.9 % bolus 500 mL  -     dextrose 5 % in water (D5W) bolus 500 mL  -     diphenhydrAMINE (BENADryl) injection 50 mg  -     methylPREDNISolone sod succinate (SOLU-Medrol) 40 mg/mL injection 40 mg  -     famotidine PF (Pepcid) injection 20 mg  -     EPINEPHrine (Epipen) injection syringe 0.3 mg  -     albuterol 2.5 mg /3 mL (0.083 %) nebulizer solution 3 mL  -     diphenhydrAMINE (BENADryl) 50 mg in sodium chloride 0.9% 50 mL IV  -     dexAMETHasone (Decadron) 8 mg in dextrose 5% 50 mL IV  -     ondansetron (Zofran) injection 4 mg  -     famotidine PF (Pepcid) injection 40 mg  -     acetaminophen (Tylenol) tablet 650 mg  -     prochlorperazine (Compazine) tablet 10 mg  -     prochlorperazine (Compazine) injection 10 mg  -     riTUXimab-pvvr (Ruxience) 600 mg in sodium chloride 0.9% 310 mL IV  -     sodium chloride 0.9 % bolus 500 mL  -     dextrose 5 % in water (D5W) bolus 500 mL  -     diphenhydrAMINE (BENADryl) injection 50 mg  -     methylPREDNISolone sod succinate (SOLU-Medrol) 40 mg/mL injection 40 mg  -     famotidine PF (Pepcid) injection 20 mg  -      EPINEPHrine (Epipen) injection syringe 0.3 mg  -     albuterol 2.5 mg /3 mL (0.083 %) nebulizer solution 3 mL      Uma Stevenson MD  Hematology/Oncology  [facility]      Scribe Attestation  By signing my name below, Phoebe MOORE Scribe, attest that this documentation has been prepared under the direction and in the presence of Uma Stevenson MD.    Provider Attestation  Uma MOORE MD, personally performed the services described in the documentation as scribed by Phoebe Madden, in my presence, and confirm it is both accurate and complete.

## 2025-07-21 ENCOUNTER — OFFICE VISIT (OUTPATIENT)
Dept: HEMATOLOGY/ONCOLOGY | Facility: CLINIC | Age: 70
End: 2025-07-21
Payer: MEDICARE

## 2025-07-21 ENCOUNTER — INFUSION (OUTPATIENT)
Dept: HEMATOLOGY/ONCOLOGY | Facility: CLINIC | Age: 70
End: 2025-07-21
Payer: MEDICARE

## 2025-07-21 VITALS
RESPIRATION RATE: 16 BRPM | DIASTOLIC BLOOD PRESSURE: 73 MMHG | SYSTOLIC BLOOD PRESSURE: 117 MMHG | OXYGEN SATURATION: 96 % | TEMPERATURE: 97.2 F | HEART RATE: 56 BPM

## 2025-07-21 VITALS
BODY MASS INDEX: 28.39 KG/M2 | TEMPERATURE: 97.3 F | OXYGEN SATURATION: 98 % | HEART RATE: 55 BPM | WEIGHT: 153.88 LBS | SYSTOLIC BLOOD PRESSURE: 132 MMHG | DIASTOLIC BLOOD PRESSURE: 73 MMHG | RESPIRATION RATE: 16 BRPM

## 2025-07-21 DIAGNOSIS — C82.08 GRADE 1 FOLLICULAR LYMPHOMA OF LYMPH NODES OF MULTIPLE REGIONS (MULTI): ICD-10-CM

## 2025-07-21 DIAGNOSIS — C82.08 GRADE 1 FOLLICULAR LYMPHOMA OF LYMPH NODES OF MULTIPLE REGIONS (MULTI): Primary | ICD-10-CM

## 2025-07-21 PROCEDURE — 2500000001 HC RX 250 WO HCPCS SELF ADMINISTERED DRUGS (ALT 637 FOR MEDICARE OP): Performed by: INTERNAL MEDICINE

## 2025-07-21 PROCEDURE — 99214 OFFICE O/P EST MOD 30 MIN: CPT | Mod: 25 | Performed by: INTERNAL MEDICINE

## 2025-07-21 PROCEDURE — 96375 TX/PRO/DX INJ NEW DRUG ADDON: CPT | Mod: INF

## 2025-07-21 PROCEDURE — 96415 CHEMO IV INFUSION ADDL HR: CPT

## 2025-07-21 PROCEDURE — 1125F AMNT PAIN NOTED PAIN PRSNT: CPT | Performed by: INTERNAL MEDICINE

## 2025-07-21 PROCEDURE — 96367 TX/PROPH/DG ADDL SEQ IV INF: CPT

## 2025-07-21 PROCEDURE — 1036F TOBACCO NON-USER: CPT | Performed by: INTERNAL MEDICINE

## 2025-07-21 PROCEDURE — 1159F MED LIST DOCD IN RCRD: CPT | Performed by: INTERNAL MEDICINE

## 2025-07-21 PROCEDURE — G2211 COMPLEX E/M VISIT ADD ON: HCPCS | Performed by: INTERNAL MEDICINE

## 2025-07-21 PROCEDURE — 2500000004 HC RX 250 GENERAL PHARMACY W/ HCPCS (ALT 636 FOR OP/ED): Performed by: INTERNAL MEDICINE

## 2025-07-21 PROCEDURE — 96413 CHEMO IV INFUSION 1 HR: CPT

## 2025-07-21 PROCEDURE — 99214 OFFICE O/P EST MOD 30 MIN: CPT | Performed by: INTERNAL MEDICINE

## 2025-07-21 RX ORDER — PROCHLORPERAZINE EDISYLATE 5 MG/ML
10 INJECTION INTRAMUSCULAR; INTRAVENOUS EVERY 6 HOURS PRN
OUTPATIENT
Start: 2025-09-16

## 2025-07-21 RX ORDER — ALBUTEROL SULFATE 0.83 MG/ML
3 SOLUTION RESPIRATORY (INHALATION) AS NEEDED
OUTPATIENT
Start: 2025-11-11

## 2025-07-21 RX ORDER — ONDANSETRON HYDROCHLORIDE 2 MG/ML
4 INJECTION, SOLUTION INTRAVENOUS ONCE
OUTPATIENT
Start: 2025-11-11 | End: 2025-11-10

## 2025-07-21 RX ORDER — ALBUTEROL SULFATE 0.83 MG/ML
3 SOLUTION RESPIRATORY (INHALATION) AS NEEDED
OUTPATIENT
Start: 2026-03-03

## 2025-07-21 RX ORDER — FAMOTIDINE 10 MG/ML
40 INJECTION, SOLUTION INTRAVENOUS ONCE
OUTPATIENT
Start: 2026-04-28 | End: 2026-04-27

## 2025-07-21 RX ORDER — HEPARIN 100 UNIT/ML
500 SYRINGE INTRAVENOUS AS NEEDED
OUTPATIENT
Start: 2025-07-21

## 2025-07-21 RX ORDER — FAMOTIDINE 10 MG/ML
20 INJECTION, SOLUTION INTRAVENOUS ONCE AS NEEDED
OUTPATIENT
Start: 2026-04-28

## 2025-07-21 RX ORDER — PROCHLORPERAZINE EDISYLATE 5 MG/ML
10 INJECTION INTRAMUSCULAR; INTRAVENOUS EVERY 6 HOURS PRN
OUTPATIENT
Start: 2026-04-28

## 2025-07-21 RX ORDER — EPINEPHRINE 0.3 MG/.3ML
0.3 INJECTION SUBCUTANEOUS EVERY 5 MIN PRN
OUTPATIENT
Start: 2025-09-16

## 2025-07-21 RX ORDER — HEPARIN SODIUM,PORCINE/PF 10 UNIT/ML
50 SYRINGE (ML) INTRAVENOUS AS NEEDED
Status: DISCONTINUED | OUTPATIENT
Start: 2025-07-21 | End: 2025-07-21 | Stop reason: HOSPADM

## 2025-07-21 RX ORDER — ACETAMINOPHEN 325 MG/1
650 TABLET ORAL ONCE
OUTPATIENT
Start: 2026-04-28

## 2025-07-21 RX ORDER — DIPHENHYDRAMINE HYDROCHLORIDE 50 MG/ML
50 INJECTION, SOLUTION INTRAMUSCULAR; INTRAVENOUS AS NEEDED
OUTPATIENT
Start: 2025-09-16

## 2025-07-21 RX ORDER — PROCHLORPERAZINE EDISYLATE 5 MG/ML
10 INJECTION INTRAMUSCULAR; INTRAVENOUS EVERY 6 HOURS PRN
OUTPATIENT
Start: 2026-03-03

## 2025-07-21 RX ORDER — PROCHLORPERAZINE MALEATE 10 MG
10 TABLET ORAL EVERY 6 HOURS PRN
OUTPATIENT
Start: 2026-01-06

## 2025-07-21 RX ORDER — ACETAMINOPHEN 325 MG/1
650 TABLET ORAL ONCE
OUTPATIENT
Start: 2025-09-16

## 2025-07-21 RX ORDER — FAMOTIDINE 10 MG/ML
20 INJECTION, SOLUTION INTRAVENOUS ONCE AS NEEDED
Status: DISCONTINUED | OUTPATIENT
Start: 2025-07-21 | End: 2025-07-21 | Stop reason: HOSPADM

## 2025-07-21 RX ORDER — ONDANSETRON HYDROCHLORIDE 2 MG/ML
4 INJECTION, SOLUTION INTRAVENOUS ONCE
OUTPATIENT
Start: 2025-09-16 | End: 2025-09-15

## 2025-07-21 RX ORDER — FAMOTIDINE 10 MG/ML
20 INJECTION, SOLUTION INTRAVENOUS ONCE AS NEEDED
OUTPATIENT
Start: 2025-09-16

## 2025-07-21 RX ORDER — ONDANSETRON HYDROCHLORIDE 2 MG/ML
4 INJECTION, SOLUTION INTRAVENOUS ONCE
OUTPATIENT
Start: 2026-04-28 | End: 2026-04-27

## 2025-07-21 RX ORDER — ACETAMINOPHEN 325 MG/1
650 TABLET ORAL ONCE
Status: COMPLETED | OUTPATIENT
Start: 2025-07-21 | End: 2025-07-21

## 2025-07-21 RX ORDER — ALBUTEROL SULFATE 0.83 MG/ML
3 SOLUTION RESPIRATORY (INHALATION) AS NEEDED
OUTPATIENT
Start: 2025-09-16

## 2025-07-21 RX ORDER — PROCHLORPERAZINE MALEATE 10 MG
10 TABLET ORAL EVERY 6 HOURS PRN
OUTPATIENT
Start: 2025-11-11

## 2025-07-21 RX ORDER — ACETAMINOPHEN 325 MG/1
650 TABLET ORAL ONCE
OUTPATIENT
Start: 2025-11-11

## 2025-07-21 RX ORDER — EPINEPHRINE 0.3 MG/.3ML
0.3 INJECTION SUBCUTANEOUS EVERY 5 MIN PRN
Status: DISCONTINUED | OUTPATIENT
Start: 2025-07-21 | End: 2025-07-21 | Stop reason: HOSPADM

## 2025-07-21 RX ORDER — DIPHENHYDRAMINE HYDROCHLORIDE 50 MG/ML
50 INJECTION, SOLUTION INTRAMUSCULAR; INTRAVENOUS AS NEEDED
OUTPATIENT
Start: 2025-11-11

## 2025-07-21 RX ORDER — HEPARIN 100 UNIT/ML
500 SYRINGE INTRAVENOUS AS NEEDED
Status: DISCONTINUED | OUTPATIENT
Start: 2025-07-21 | End: 2025-07-21 | Stop reason: HOSPADM

## 2025-07-21 RX ORDER — PROCHLORPERAZINE EDISYLATE 5 MG/ML
10 INJECTION INTRAMUSCULAR; INTRAVENOUS EVERY 6 HOURS PRN
OUTPATIENT
Start: 2025-11-11

## 2025-07-21 RX ORDER — EPINEPHRINE 0.3 MG/.3ML
0.3 INJECTION SUBCUTANEOUS EVERY 5 MIN PRN
OUTPATIENT
Start: 2026-04-28

## 2025-07-21 RX ORDER — FAMOTIDINE 10 MG/ML
40 INJECTION, SOLUTION INTRAVENOUS ONCE
OUTPATIENT
Start: 2025-09-16 | End: 2025-09-15

## 2025-07-21 RX ORDER — DIPHENHYDRAMINE HYDROCHLORIDE 50 MG/ML
50 INJECTION, SOLUTION INTRAMUSCULAR; INTRAVENOUS AS NEEDED
OUTPATIENT
Start: 2026-03-03

## 2025-07-21 RX ORDER — ONDANSETRON HYDROCHLORIDE 2 MG/ML
4 INJECTION, SOLUTION INTRAVENOUS ONCE
OUTPATIENT
Start: 2026-03-03 | End: 2026-03-02

## 2025-07-21 RX ORDER — ALBUTEROL SULFATE 0.83 MG/ML
3 SOLUTION RESPIRATORY (INHALATION) AS NEEDED
OUTPATIENT
Start: 2026-04-28

## 2025-07-21 RX ORDER — EPINEPHRINE 0.3 MG/.3ML
0.3 INJECTION SUBCUTANEOUS EVERY 5 MIN PRN
OUTPATIENT
Start: 2026-01-06

## 2025-07-21 RX ORDER — PROCHLORPERAZINE MALEATE 10 MG
10 TABLET ORAL EVERY 6 HOURS PRN
OUTPATIENT
Start: 2026-03-03

## 2025-07-21 RX ORDER — DIPHENHYDRAMINE HYDROCHLORIDE 50 MG/ML
50 INJECTION, SOLUTION INTRAMUSCULAR; INTRAVENOUS AS NEEDED
Status: DISCONTINUED | OUTPATIENT
Start: 2025-07-21 | End: 2025-07-21 | Stop reason: HOSPADM

## 2025-07-21 RX ORDER — ALBUTEROL SULFATE 0.83 MG/ML
3 SOLUTION RESPIRATORY (INHALATION) AS NEEDED
Status: DISCONTINUED | OUTPATIENT
Start: 2025-07-21 | End: 2025-07-21 | Stop reason: HOSPADM

## 2025-07-21 RX ORDER — DIPHENHYDRAMINE HYDROCHLORIDE 50 MG/ML
50 INJECTION, SOLUTION INTRAMUSCULAR; INTRAVENOUS AS NEEDED
OUTPATIENT
Start: 2026-04-28

## 2025-07-21 RX ORDER — EPINEPHRINE 0.3 MG/.3ML
0.3 INJECTION SUBCUTANEOUS EVERY 5 MIN PRN
OUTPATIENT
Start: 2026-03-03

## 2025-07-21 RX ORDER — FAMOTIDINE 10 MG/ML
20 INJECTION, SOLUTION INTRAVENOUS ONCE AS NEEDED
OUTPATIENT
Start: 2026-01-06

## 2025-07-21 RX ORDER — DIPHENHYDRAMINE HYDROCHLORIDE 50 MG/ML
50 INJECTION, SOLUTION INTRAMUSCULAR; INTRAVENOUS AS NEEDED
OUTPATIENT
Start: 2026-01-06

## 2025-07-21 RX ORDER — ACETAMINOPHEN 325 MG/1
650 TABLET ORAL ONCE
OUTPATIENT
Start: 2026-01-06

## 2025-07-21 RX ORDER — PROCHLORPERAZINE EDISYLATE 5 MG/ML
10 INJECTION INTRAMUSCULAR; INTRAVENOUS EVERY 6 HOURS PRN
Status: DISCONTINUED | OUTPATIENT
Start: 2025-07-21 | End: 2025-07-21 | Stop reason: HOSPADM

## 2025-07-21 RX ORDER — FAMOTIDINE 10 MG/ML
40 INJECTION, SOLUTION INTRAVENOUS ONCE
OUTPATIENT
Start: 2025-11-11 | End: 2025-11-10

## 2025-07-21 RX ORDER — FAMOTIDINE 10 MG/ML
40 INJECTION, SOLUTION INTRAVENOUS ONCE
OUTPATIENT
Start: 2026-01-06 | End: 2026-01-05

## 2025-07-21 RX ORDER — HEPARIN SODIUM,PORCINE/PF 10 UNIT/ML
50 SYRINGE (ML) INTRAVENOUS AS NEEDED
OUTPATIENT
Start: 2025-07-21

## 2025-07-21 RX ORDER — ONDANSETRON HYDROCHLORIDE 2 MG/ML
4 INJECTION, SOLUTION INTRAVENOUS ONCE
OUTPATIENT
Start: 2026-01-06 | End: 2026-01-05

## 2025-07-21 RX ORDER — FAMOTIDINE 10 MG/ML
20 INJECTION, SOLUTION INTRAVENOUS ONCE AS NEEDED
OUTPATIENT
Start: 2025-11-11

## 2025-07-21 RX ORDER — ACETAMINOPHEN 325 MG/1
650 TABLET ORAL ONCE
OUTPATIENT
Start: 2026-03-03

## 2025-07-21 RX ORDER — FAMOTIDINE 10 MG/ML
40 INJECTION, SOLUTION INTRAVENOUS ONCE
Status: COMPLETED | OUTPATIENT
Start: 2025-07-21 | End: 2025-07-21

## 2025-07-21 RX ORDER — EPINEPHRINE 0.3 MG/.3ML
0.3 INJECTION SUBCUTANEOUS EVERY 5 MIN PRN
OUTPATIENT
Start: 2025-11-11

## 2025-07-21 RX ORDER — FAMOTIDINE 10 MG/ML
40 INJECTION, SOLUTION INTRAVENOUS ONCE
OUTPATIENT
Start: 2026-03-03 | End: 2026-03-02

## 2025-07-21 RX ORDER — PROCHLORPERAZINE MALEATE 10 MG
10 TABLET ORAL EVERY 6 HOURS PRN
OUTPATIENT
Start: 2025-09-16

## 2025-07-21 RX ORDER — PROCHLORPERAZINE MALEATE 10 MG
10 TABLET ORAL EVERY 6 HOURS PRN
Status: DISCONTINUED | OUTPATIENT
Start: 2025-07-21 | End: 2025-07-21 | Stop reason: HOSPADM

## 2025-07-21 RX ORDER — FAMOTIDINE 10 MG/ML
20 INJECTION, SOLUTION INTRAVENOUS ONCE AS NEEDED
OUTPATIENT
Start: 2026-03-03

## 2025-07-21 RX ORDER — ONDANSETRON HYDROCHLORIDE 2 MG/ML
4 INJECTION, SOLUTION INTRAVENOUS ONCE
Status: COMPLETED | OUTPATIENT
Start: 2025-07-21 | End: 2025-07-21

## 2025-07-21 RX ORDER — PROCHLORPERAZINE MALEATE 10 MG
10 TABLET ORAL EVERY 6 HOURS PRN
OUTPATIENT
Start: 2026-04-28

## 2025-07-21 RX ORDER — ALBUTEROL SULFATE 0.83 MG/ML
3 SOLUTION RESPIRATORY (INHALATION) AS NEEDED
OUTPATIENT
Start: 2026-01-06

## 2025-07-21 RX ORDER — PROCHLORPERAZINE EDISYLATE 5 MG/ML
10 INJECTION INTRAMUSCULAR; INTRAVENOUS EVERY 6 HOURS PRN
OUTPATIENT
Start: 2026-01-06

## 2025-07-21 RX ADMIN — ACETAMINOPHEN 650 MG: 325 TABLET ORAL at 10:54

## 2025-07-21 RX ADMIN — DEXAMETHASONE SODIUM PHOSPHATE 8 MG: 10 INJECTION, SOLUTION INTRAMUSCULAR; INTRAVENOUS at 11:00

## 2025-07-21 RX ADMIN — SODIUM CHLORIDE 600 MG: 0.9 INJECTION, SOLUTION INTRAVENOUS at 11:21

## 2025-07-21 RX ADMIN — FAMOTIDINE 40 MG: 10 INJECTION INTRAVENOUS at 10:56

## 2025-07-21 RX ADMIN — DIPHENHYDRAMINE HYDROCHLORIDE 50 MG: 50 INJECTION INTRAMUSCULAR; INTRAVENOUS at 10:30

## 2025-07-21 RX ADMIN — ONDANSETRON 4 MG: 2 INJECTION INTRAMUSCULAR; INTRAVENOUS at 10:54

## 2025-07-21 ASSESSMENT — PAIN SCALES - GENERAL: PAINLEVEL_OUTOF10: 3

## 2025-07-24 ENCOUNTER — APPOINTMENT (OUTPATIENT)
Dept: INTEGRATIVE MEDICINE | Facility: CLINIC | Age: 70
End: 2025-07-24
Payer: MEDICARE

## 2025-07-28 ENCOUNTER — APPOINTMENT (OUTPATIENT)
Dept: INTEGRATIVE MEDICINE | Facility: CLINIC | Age: 70
End: 2025-07-28
Payer: MEDICARE

## 2025-07-28 DIAGNOSIS — M99.03 SEGMENTAL AND SOMATIC DYSFUNCTION OF LUMBAR REGION: ICD-10-CM

## 2025-07-28 DIAGNOSIS — M53.3 SACRAL BACK PAIN: ICD-10-CM

## 2025-07-28 DIAGNOSIS — M99.04 SEGMENTAL AND SOMATIC DYSFUNCTION OF SACRAL REGION: Primary | ICD-10-CM

## 2025-07-28 DIAGNOSIS — M79.9 POSTURAL STRAIN: ICD-10-CM

## 2025-07-28 DIAGNOSIS — M99.02 SEGMENTAL AND SOMATIC DYSFUNCTION OF THORACIC REGION: ICD-10-CM

## 2025-07-28 DIAGNOSIS — M79.10 MYALGIA: ICD-10-CM

## 2025-07-28 DIAGNOSIS — M54.2 NECK PAIN: ICD-10-CM

## 2025-07-28 DIAGNOSIS — M99.01 SEGMENTAL AND SOMATIC DYSFUNCTION OF CERVICAL REGION: ICD-10-CM

## 2025-07-28 PROCEDURE — 98941 CHIROPRACT MANJ 3-4 REGIONS: CPT

## 2025-07-28 NOTE — PROGRESS NOTES
Subjective   Patient ID: Yoana Herzog is a 70 y.o. female who presents July 28, 2025 for chiropractic care.    Medicare (7) VPCY    Today, the patient rates their degree of pain as a 4 out of 10 on the numeric pain rating scale.     Yoana has been doing ok despite being very busy the past month. She has had some of her children back in town either moving or visiting. She reports low back is primary area of complaint today and also neck and shoulders, both areas which we have addressed before.   ________________________________________________  (SP)INITIAL INTAKE/SUBJECTIVE 12/12/22: She has seen chiropractic in the past. Her last adjustment was about 5 weeks ago. Is not that she is dissatisfied with the care she has been given, but she is hoping that our treatments will help her completely resolve the issue to the point where she does not need regular care.     Today her primary complaint is left neck and shoulder pain. 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 She also enjoys sewing and notices this pain does get worse after she has been doing a lot of sewing. She will occasionally feel tingling into the left hand most notably in the #3 through 5 fingers. She does have some headaches but these are localized to the suboccipital region and do not radiate into the forehead eyes or temples. In the past her physical therapist and chiropractors have done manual stretching of the left shoulder and done adjustments which have helped.     Her secondary pain complaints are in the right hip glued and sacral pains. She does sleep with a pillow between the knees. She is unsure why but the last 6 to 12 months this has been getting progressively worse. She will occasionally have pain in the hamstring region but this is rare. She will occasionally feel sharp intense pain in the hip or the lower back but this only typically lasts a few  seconds. Generally is more of a dull aching pain. The point of max tenderness is localized to the middle of the right butt cheek. No one has worked on this or massage it in the past.      Objective   Physical Exam    Neurological:      General: No focal deficit present.      Mental Status: She is alert and oriented to person, place, and time.      Cranial Nerves: No dysarthria or facial asymmetry.      Sensory: Sensation is intact.      Motor: Motor function is intact.      Coordination: Coordination is intact.      Gait: Gait is intact.       Palpation of the following region(s) revealed:  Cervical: Suboccipitals bilateral, muscular hypertonicity.  Scalenes bilateral, muscular hypertonicity.  Upper trapezius bilateral, muscular hypertonicity.  Levator scapulae bilateral, muscular hypertonicity.  Cervical paraspinals bilateral, muscular hypertonicity.  Thoracic: Thoracic paraspinals bilateral, muscular hypertonicity.  Middle trapezius bilateral, muscular hypertonicity.  Lower trapezius bilateral, muscular hypertonicity.  Rhomboids bilateral, muscular hypertonicity.  Lumbar: Lumbar paraspinals bilateral, muscular hypertonicity.  Quadratus lumborum bilateral, muscular hypertonicity.  Gluteal bilateral, muscular hypertonicity.  Piriformis bilateral, muscular hypertonicity.    Segmental Joint(s): Segmental joint dysfunction was assessed with motion palpation and is identified in the following areas:  Cervical : C2 C3  Thoracic : T5, T6, T7, and T8  Lumbopelvic / Sacral SIJ : L2, L3, L4, L5/S1, and R SIJ    Assessment/Plan   Today's Treatment Included: Spinal manipulation to the following regions of segmental dysfunction identified on examination, using age-appropriate and injury specific force. Segmental Joint(s) Cervical : C2 C3 Segmental Joint(s) Thoracic : T5, T6, T7, and T8 Segmental Joint(s) Lumbopelvic/Sacral SIJ : L2, L3, L4, L5/S1, and R SIJ  Chiropractic manipulation treatment techniques utilized: Diversified  CMT, Pelvic drop table technique, and Flexion-distraction technique  Soft tissue mobilization techniques utilized during treatment: Pin and Stretch and Ischemic compression    Soft-tissue mobilization was performed in the following areas:   Suboccipital bilateral, Cervical paraspinal mm. bilateral, Scalenes bilateral, Upper Trapezius bilateral, and Levator Scap. bilateral  Thoracic Paraspinal mm. bilateral, Middle Trapezius bilateral, Lower Trapezius bilateral, and Rhomboids bilateral  Lumbar Paraspinal mm. bilateral, Quadratus Lumborum bilateral, Gluteal mm. Glute. Max.  and Glute. Med. bilateral, and Piriformis bilateral    The patient tolerated today's treatment with little or no additional discomfort and was instructed to contact the office for questions or concerns.

## 2025-08-11 ENCOUNTER — HOSPITAL ENCOUNTER (OUTPATIENT)
Dept: RADIOLOGY | Facility: HOSPITAL | Age: 70
Discharge: HOME | End: 2025-08-11
Payer: MEDICARE

## 2025-08-11 DIAGNOSIS — E78.5 DYSLIPIDEMIA: ICD-10-CM

## 2025-08-11 PROCEDURE — 75571 CT HRT W/O DYE W/CA TEST: CPT

## 2025-08-20 ENCOUNTER — APPOINTMENT (OUTPATIENT)
Dept: PRIMARY CARE | Facility: CLINIC | Age: 70
End: 2025-08-20
Payer: MEDICARE

## 2025-08-20 VITALS
SYSTOLIC BLOOD PRESSURE: 110 MMHG | DIASTOLIC BLOOD PRESSURE: 62 MMHG | BODY MASS INDEX: 28.34 KG/M2 | RESPIRATION RATE: 16 BRPM | WEIGHT: 153.6 LBS | OXYGEN SATURATION: 96 % | HEART RATE: 54 BPM | TEMPERATURE: 97.1 F

## 2025-08-20 DIAGNOSIS — Z00.00 ANNUAL PHYSICAL EXAM: Primary | ICD-10-CM

## 2025-08-20 DIAGNOSIS — K86.2 PANCREATIC CYST (HHS-HCC): ICD-10-CM

## 2025-08-20 DIAGNOSIS — C82.80: ICD-10-CM

## 2025-08-20 DIAGNOSIS — Z12.31 ENCOUNTER FOR SCREENING MAMMOGRAM FOR MALIGNANT NEOPLASM OF BREAST: ICD-10-CM

## 2025-08-20 DIAGNOSIS — E78.5 DYSLIPIDEMIA: ICD-10-CM

## 2025-08-20 PROCEDURE — 1159F MED LIST DOCD IN RCRD: CPT | Performed by: INTERNAL MEDICINE

## 2025-08-20 PROCEDURE — 99397 PER PM REEVAL EST PAT 65+ YR: CPT | Performed by: INTERNAL MEDICINE

## 2025-08-20 PROCEDURE — 1036F TOBACCO NON-USER: CPT | Performed by: INTERNAL MEDICINE

## 2025-08-20 PROCEDURE — 99213 OFFICE O/P EST LOW 20 MIN: CPT | Performed by: INTERNAL MEDICINE

## 2025-08-20 PROCEDURE — 1160F RVW MEDS BY RX/DR IN RCRD: CPT | Performed by: INTERNAL MEDICINE

## 2025-08-20 ASSESSMENT — ENCOUNTER SYMPTOMS
DYSURIA: 0
DIARRHEA: 0
VOMITING: 0
COUGH: 0
SHORTNESS OF BREATH: 0
ARTHRALGIAS: 0
ADENOPATHY: 0
ABDOMINAL PAIN: 0
WHEEZING: 0
JOINT SWELLING: 0
UNEXPECTED WEIGHT CHANGE: 0
DIZZINESS: 0
CHILLS: 0
CONFUSION: 0
WEAKNESS: 0
CONSTIPATION: 0
NAUSEA: 0
FATIGUE: 0
PALPITATIONS: 0
CHEST TIGHTNESS: 0
SLEEP DISTURBANCE: 0
SORE THROAT: 0

## 2025-08-20 ASSESSMENT — PROMIS GLOBAL HEALTH SCALE
CARRYOUT_SOCIAL_ACTIVITIES: EXCELLENT
RATE_AVERAGE_PAIN: 1
RATE_GENERAL_HEALTH: VERY GOOD
RATE_MENTAL_HEALTH: EXCELLENT
RATE_QUALITY_OF_LIFE: EXCELLENT
RATE_SOCIAL_SATISFACTION: EXCELLENT
EMOTIONAL_PROBLEMS: NEVER
RATE_PHYSICAL_HEALTH: VERY GOOD
CARRYOUT_PHYSICAL_ACTIVITIES: COMPLETELY

## 2025-08-20 ASSESSMENT — PATIENT HEALTH QUESTIONNAIRE - PHQ9
2. FEELING DOWN, DEPRESSED OR HOPELESS: NOT AT ALL
SUM OF ALL RESPONSES TO PHQ9 QUESTIONS 1 AND 2: 0
1. LITTLE INTEREST OR PLEASURE IN DOING THINGS: NOT AT ALL

## 2025-08-25 ENCOUNTER — APPOINTMENT (OUTPATIENT)
Dept: INTEGRATIVE MEDICINE | Facility: CLINIC | Age: 70
End: 2025-08-25
Payer: MEDICARE

## 2025-08-25 DIAGNOSIS — M79.10 MYALGIA: ICD-10-CM

## 2025-08-25 DIAGNOSIS — M54.2 NECK PAIN: ICD-10-CM

## 2025-08-25 DIAGNOSIS — M99.03 SEGMENTAL AND SOMATIC DYSFUNCTION OF LUMBAR REGION: ICD-10-CM

## 2025-08-25 DIAGNOSIS — M79.9 POSTURAL STRAIN: ICD-10-CM

## 2025-08-25 DIAGNOSIS — M99.01 SEGMENTAL AND SOMATIC DYSFUNCTION OF CERVICAL REGION: ICD-10-CM

## 2025-08-25 DIAGNOSIS — M99.04 SEGMENTAL AND SOMATIC DYSFUNCTION OF SACRAL REGION: Primary | ICD-10-CM

## 2025-08-25 DIAGNOSIS — M53.3 SACRAL BACK PAIN: ICD-10-CM

## 2025-08-25 DIAGNOSIS — M99.02 SEGMENTAL AND SOMATIC DYSFUNCTION OF THORACIC REGION: ICD-10-CM

## 2025-08-25 PROCEDURE — 98941 CHIROPRACT MANJ 3-4 REGIONS: CPT

## 2025-09-22 ENCOUNTER — APPOINTMENT (OUTPATIENT)
Dept: INTEGRATIVE MEDICINE | Facility: CLINIC | Age: 70
End: 2025-09-22
Payer: MEDICARE

## 2026-05-14 ENCOUNTER — APPOINTMENT (OUTPATIENT)
Dept: PRIMARY CARE | Facility: CLINIC | Age: 71
End: 2026-05-14
Payer: MEDICARE

## 2026-08-26 ENCOUNTER — APPOINTMENT (OUTPATIENT)
Dept: PRIMARY CARE | Facility: CLINIC | Age: 71
End: 2026-08-26
Payer: MEDICARE

## (undated) DEVICE — CLIP, LIGATING, W/ADHESIVE, WIDE SLOT, SMALL, TITANIUM

## (undated) DEVICE — SUTURE, PLAIN, 5-0, 18 IN, PC1, YELLOW

## (undated) DEVICE — Device

## (undated) DEVICE — CORD, CAUTERY, BIOPOLAR FORCEP, 12FT

## (undated) DEVICE — MARKER, SKIN, DUAL TIP, W/RULER AND LABEL

## (undated) DEVICE — STRIP, SKIN CLOSURE, STERI STRIP, REINFORCED, 0.5 X 4 IN

## (undated) DEVICE — STAY SET, SURGICAL , 5MM SHARP HOOK, CS/ 50

## (undated) DEVICE — PREP, SKIN, BETADINE, SOLUTION, 16 OZ

## (undated) DEVICE — SUTURE, SILK, 2-0, TIES, 12-30 IN, BLACK

## (undated) DEVICE — SOLUTION, IRRIGATION, STERILE WATER, 1000 ML, POUR BOTTLE

## (undated) DEVICE — PROBE, STIMULATOR, MONOPOLAR, FLUSH TIP, PRASS, W/HANDLE, STANDARD

## (undated) DEVICE — SYRINGE, MONOJECT, LUER LOCK, 3 CC, LF

## (undated) DEVICE — CONTAINER STERILE SPECIMEN 90ML, STERILE

## (undated) DEVICE — GLOVE, SURGICAL, PROTEXIS PI , 7.5, PF, LF

## (undated) DEVICE — ELECTRODE, PAIRED SUBDERMAL OTO

## (undated) DEVICE — DRAPE, PAD, INSTRUMENT, MAGNETIC, MEDIUM, 10 X 16 IN, DISPOSABLE

## (undated) DEVICE — TIP, SUCTION, YANKAUER, FLEXIBLE

## (undated) DEVICE — SUTURE, VICRYL, 3-0,18 IN, SH, UNDYED

## (undated) DEVICE — SPONGE, HEMOSTAT, SURGICEL FIBRILLAR, ABS, 4 X 4, LF

## (undated) DEVICE — NEEDLE, HYPODERMIC, REGULAR WALL, REGULAR BEVEL, 30 G X 0.5 IN

## (undated) DEVICE — NEEDLE, ELECTRODE, ELECTROSURGICAL, INSULATED

## (undated) DEVICE — SUTURE, MONOCRYLIC, 4-0, P3, MONO 18

## (undated) DEVICE — TOWEL PACK, STERILE, 4/PACK, BLUE

## (undated) DEVICE — CLIP, LIGATING, W/ADHESIVE PAD, MEDIUM, TITANIUM

## (undated) DEVICE — SUTURE, SILK, 3-0, 30 IN, MULTIPACK, BLACK

## (undated) DEVICE — SOLUTION, IRRIGATION, SODIUM CHLORIDE 0.9%, 1000 ML, POUR BOTTLE